# Patient Record
Sex: FEMALE | Race: WHITE | NOT HISPANIC OR LATINO | Employment: PART TIME | URBAN - NONMETROPOLITAN AREA
[De-identification: names, ages, dates, MRNs, and addresses within clinical notes are randomized per-mention and may not be internally consistent; named-entity substitution may affect disease eponyms.]

---

## 2017-05-01 ENCOUNTER — OFFICE VISIT (OUTPATIENT)
Dept: CARDIOLOGY | Facility: CLINIC | Age: 37
End: 2017-05-01

## 2017-05-01 VITALS
HEART RATE: 80 BPM | HEIGHT: 64 IN | BODY MASS INDEX: 36.37 KG/M2 | DIASTOLIC BLOOD PRESSURE: 78 MMHG | WEIGHT: 213 LBS | SYSTOLIC BLOOD PRESSURE: 114 MMHG

## 2017-05-01 DIAGNOSIS — R07.89 OTHER CHEST PAIN: ICD-10-CM

## 2017-05-01 DIAGNOSIS — E88.81 METABOLIC SYNDROME: ICD-10-CM

## 2017-05-01 DIAGNOSIS — I10 ESSENTIAL HYPERTENSION: Primary | ICD-10-CM

## 2017-05-01 DIAGNOSIS — R06.02 SHORTNESS OF BREATH: ICD-10-CM

## 2017-05-01 DIAGNOSIS — I27.20 PULMONARY HTN (HCC): ICD-10-CM

## 2017-05-01 DIAGNOSIS — I25.6 SILENT MYOCARDIAL ISCHEMIA: ICD-10-CM

## 2017-05-01 DIAGNOSIS — R00.0 TACHYCARDIA: ICD-10-CM

## 2017-05-01 PROCEDURE — 99214 OFFICE O/P EST MOD 30 MIN: CPT | Performed by: NURSE PRACTITIONER

## 2017-05-01 RX ORDER — TIZANIDINE 4 MG/1
4 TABLET ORAL NIGHTLY PRN
COMMUNITY
End: 2018-03-05

## 2017-05-01 RX ORDER — ERGOCALCIFEROL 1.25 MG/1
50000 CAPSULE ORAL
COMMUNITY
End: 2022-02-03

## 2017-05-01 RX ORDER — HYDROCODONE BITARTRATE AND ACETAMINOPHEN 5; 325 MG/1; MG/1
1 TABLET ORAL EVERY 8 HOURS PRN
COMMUNITY
End: 2017-12-12 | Stop reason: DRUGHIGH

## 2017-05-01 RX ORDER — SUMATRIPTAN 100 MG/1
100 TABLET, FILM COATED ORAL ONCE AS NEEDED
COMMUNITY
End: 2017-09-13 | Stop reason: DRUGHIGH

## 2017-05-01 RX ORDER — FUROSEMIDE 40 MG/1
40 TABLET ORAL DAILY
COMMUNITY
End: 2019-02-26 | Stop reason: SDUPTHER

## 2017-05-18 ENCOUNTER — OUTSIDE FACILITY SERVICE (OUTPATIENT)
Dept: CARDIOLOGY | Facility: CLINIC | Age: 37
End: 2017-05-18

## 2017-05-18 ENCOUNTER — HOSPITAL ENCOUNTER (OUTPATIENT)
Dept: CARDIOLOGY | Facility: HOSPITAL | Age: 37
Discharge: HOME | End: 2017-05-18

## 2017-05-18 DIAGNOSIS — E88.81 METABOLIC SYNDROME: ICD-10-CM

## 2017-05-18 DIAGNOSIS — I27.20 PULMONARY HTN (HCC): ICD-10-CM

## 2017-05-18 DIAGNOSIS — R06.02 SHORTNESS OF BREATH: ICD-10-CM

## 2017-05-18 DIAGNOSIS — R07.89 OTHER CHEST PAIN: ICD-10-CM

## 2017-05-18 DIAGNOSIS — I25.6 SILENT MYOCARDIAL ISCHEMIA: ICD-10-CM

## 2017-05-18 LAB
MAXIMAL PREDICTED HEART RATE: 182 BPM
STRESS TARGET HR: 155 BPM

## 2017-05-18 PROCEDURE — 93306 TTE W/DOPPLER COMPLETE: CPT

## 2017-05-18 PROCEDURE — A9500 TC99M SESTAMIBI: HCPCS | Performed by: INTERNAL MEDICINE

## 2017-05-18 PROCEDURE — 93306 TTE W/DOPPLER COMPLETE: CPT | Performed by: INTERNAL MEDICINE

## 2017-05-18 PROCEDURE — 0 TECHNETIUM SESTAMIBI: Performed by: INTERNAL MEDICINE

## 2017-05-18 PROCEDURE — 78452 HT MUSCLE IMAGE SPECT MULT: CPT

## 2017-05-18 PROCEDURE — 93017 CV STRESS TEST TRACING ONLY: CPT

## 2017-05-18 PROCEDURE — 93018 CV STRESS TEST I&R ONLY: CPT | Performed by: INTERNAL MEDICINE

## 2017-05-18 PROCEDURE — 78452 HT MUSCLE IMAGE SPECT MULT: CPT | Performed by: INTERNAL MEDICINE

## 2017-05-18 RX ADMIN — Medication 1 DOSE: at 08:45

## 2017-05-22 ENCOUNTER — TELEPHONE (OUTPATIENT)
Dept: CARDIOLOGY | Facility: CLINIC | Age: 37
End: 2017-05-22

## 2017-05-22 RX ORDER — CARVEDILOL 12.5 MG/1
12.5 TABLET ORAL 2 TIMES DAILY
Qty: 60 TABLET | Refills: 7 | Status: SHIPPED | OUTPATIENT
Start: 2017-05-22 | End: 2017-10-31

## 2017-05-23 ENCOUNTER — OUTSIDE FACILITY SERVICE (OUTPATIENT)
Dept: CARDIOLOGY | Facility: CLINIC | Age: 37
End: 2017-05-23

## 2017-05-23 PROCEDURE — 93227 XTRNL ECG REC<48 HR R&I: CPT | Performed by: INTERNAL MEDICINE

## 2017-09-13 ENCOUNTER — OFFICE VISIT (OUTPATIENT)
Dept: NEUROLOGY | Facility: CLINIC | Age: 37
End: 2017-09-13

## 2017-09-13 VITALS
WEIGHT: 221.2 LBS | BODY MASS INDEX: 37.76 KG/M2 | OXYGEN SATURATION: 98 % | HEART RATE: 93 BPM | HEIGHT: 64 IN | DIASTOLIC BLOOD PRESSURE: 80 MMHG | SYSTOLIC BLOOD PRESSURE: 124 MMHG

## 2017-09-13 DIAGNOSIS — G62.9 POLYNEUROPATHY: Primary | ICD-10-CM

## 2017-09-13 DIAGNOSIS — G56.02 CARPAL TUNNEL SYNDROME OF LEFT WRIST: ICD-10-CM

## 2017-09-13 DIAGNOSIS — G43.011 INTRACTABLE MIGRAINE WITHOUT AURA AND WITH STATUS MIGRAINOSUS: ICD-10-CM

## 2017-09-13 PROCEDURE — 99244 OFF/OP CNSLTJ NEW/EST MOD 40: CPT | Performed by: PSYCHIATRY & NEUROLOGY

## 2017-09-13 RX ORDER — TOPIRAMATE 50 MG/1
50 TABLET, FILM COATED ORAL 2 TIMES DAILY
Qty: 60 TABLET | Refills: 3 | Status: SHIPPED | OUTPATIENT
Start: 2017-09-13 | End: 2018-02-19 | Stop reason: SDUPTHER

## 2017-09-13 RX ORDER — AMITRIPTYLINE HYDROCHLORIDE 25 MG/1
25 TABLET, FILM COATED ORAL SEE ADMIN INSTRUCTIONS
Qty: 30 TABLET | Refills: 3 | Status: SHIPPED | OUTPATIENT
Start: 2017-09-13 | End: 2018-01-18 | Stop reason: SDUPTHER

## 2017-09-13 RX ORDER — SUMATRIPTAN 50 MG/1
50 TABLET, FILM COATED ORAL ONCE AS NEEDED
COMMUNITY
Start: 2017-09-11

## 2017-09-13 NOTE — PROGRESS NOTES
"[facility] NEUROLOGY [place] CONSULTATION   History of Present Illness     Date: 9/13/2017    Patient Identification  Renate Mitchell is a 37 y.o. female.    Patient information was obtained from patient.  History/Exam limitations: none.    CONSULTATION requested by: Alessandra BENSON    Chief Complaint   Establish Care and Peripheral Neuropathy (Pt c/o neuropathy, mainly on Left side. Sx started in 2008 following chemo and radiation treatments. Sx are worse, triggered with sitting, standing or long walks. Pt has had 2 nerve conduction tests, been treated with Lyrica and Topamax )      History of Present Illness   Patient is a delightful 37-year-old lady with history of diabetes and lymphoma.  She reports burning pain that started around Feb 2008 after treatment for her Lymphoma using \"R CHOP\" regimen. The burning pain is present in her UE and LE, starting from her digits and radiating up to her forearms and her knees respectively. She further reports numbness of her hands, especially on the left.   The pain was relieved by Topramax, but not anymore.  And she also reported that she is taking Topamax 100 mg 3 times a day she had difficulty with word finding .  Now she has reduced to 100 mg once a day .  She is currently seeking some kind of medication that can help relieve her symptoms.     PMH:   Past Medical History:   Diagnosis Date   • Chest biopsy    • Depression    • Diabetes mellitus    • H/O exploratory laparotomy    • H/O tubal ligation    • Heart BX , benign    • Heart disease    • History of bone marrow biopsy    • Migraine    • Non Hodgkin's lymphoma     very close to heart, in remission, last radiation 2/08, last chemo 12/07   • Pericardial effusion     s/p natty window- removed 398 cc        Past Surgical History:   Past Surgical History:   Procedure Laterality Date   • CARDIAC BIOPSY     • CARDIOVASCULAR STRESS TEST  01/11/2008    CESAR Cardiolyte- (Dr. Jay). EF 45% at rest, 53% with stress   • " CARDIOVASCULAR STRESS TEST  02/10/2009    3 min, 20 sec. 95% THR. bp- 180/98, EF 49%. Negative   • CARDIOVASCULAR STRESS TEST  2015    R. Stress- 4 min, 103% THR. 106/68, negative by EKG criteria   • CATH LAB PROCEDURE  2009    normal but small coronaries, EF 45-50%   • CATH LAB PROCEDURE  2015    essentially normal coronaries. EF 50%   •  SECTION      x3   • ECHO - CONVERTED  02/10/2009    EF 50-55%   • ECHO - CONVERTED  2015    EF 40-45%, RVSP 43 mmHg mild MR   • OTHER SURGICAL HISTORY  2015    CT abd/pelvis and chest- essentially unremarkable   • OTHER SURGICAL HISTORY  2015    HIDA scan- WNL   • PERICARDIAL WINDOW         Family Hisotry:   Family History   Problem Relation Age of Onset   • Heart disease Mother    • Hypertension Mother    • Heart failure Mother    • Hypertension Father    • Hyperlipidemia Father    • Heart disease Child    • Breast cancer Maternal Aunt    • Diabetes Maternal Aunt    • Colon cancer Maternal Uncle    • Leukemia Paternal Uncle    • Hodgkin's lymphoma Paternal Uncle    • Lung cancer Paternal Grandfather    • Leukemia Maternal Aunt        Social History:   Social History     Social History   • Marital status: Single     Spouse name: N/A   • Number of children: N/A   • Years of education: N/A     Occupational History   • Not on file.     Social History Main Topics   • Smoking status: Never Smoker   • Smokeless tobacco: Never Used   • Alcohol use No   • Drug use: Not on file   • Sexual activity: Not on file     Other Topics Concern   • Not on file     Social History Narrative       Medications:   Current Outpatient Prescriptions   Medication Sig Dispense Refill   • carvedilol (COREG) 12.5 MG tablet Take 1 tablet by mouth 2 (Two) Times a Day. 60 tablet 7   • clonazePAM (KLONOPIN) 0.5 MG tablet Take 0.5 mg by mouth 2 (Two) Times a Day As Needed for seizures.     • FLUoxetine (PROzac) 20 MG capsule Take 20 mg by mouth Daily.     • furosemide (LASIX) 40 MG  tablet Take 40 mg by mouth Daily.     • HYDROcodone-acetaminophen (NORCO) 5-325 MG per tablet Take 1 tablet by mouth Every 8 (Eight) Hours As Needed.     • levothyroxine (SYNTHROID, LEVOTHROID) 25 MCG tablet Take 25 mcg by mouth Daily.     • metFORMIN (GLUCOPHAGE) 500 MG tablet Take 500 mg by mouth 2 (Two) Times a Day With Meals.     • omeprazole (PriLOSEC) 40 MG capsule Take 40 mg by mouth Daily.     • pregabalin (LYRICA) 150 MG capsule Take 150 mg by mouth 2 (Two) Times a Day.     • SUMAtriptan (IMITREX) 50 MG tablet Take as directed     • tiZANidine (ZANAFLEX) 4 MG tablet Take 4 mg by mouth At Night As Needed for Muscle Spasms.     • topiramate (TOPAMAX) 50 MG tablet Take 1 tablet by mouth 2 (Two) Times a Day. 60 tablet 3   • vitamin D (ERGOCALCIFEROL) 81319 UNITS capsule capsule Take 50,000 Units by mouth 1 (One) Time Per Week.     • amitriptyline (ELAVIL) 25 MG tablet Take 1 tablet by mouth See Admin Instructions. One pill at supper time 30 tablet 3     No current facility-administered medications for this visit.        Allergy:   Allergies   Allergen Reactions   • Cymbalta [Duloxetine Hcl]    • Neurontin [Gabapentin]    • Tylox [Oxycodone-Acetaminophen]        Review of Systems:  Review of Systems   Constitutional: Negative for chills and fever.   HENT: Negative for congestion, ear pain, hearing loss, rhinorrhea and sore throat.    Eyes: Negative for pain, discharge and redness.   Respiratory: Negative for cough, shortness of breath, wheezing and stridor.    Cardiovascular: Negative for chest pain, palpitations and leg swelling.   Gastrointestinal: Negative for abdominal pain, constipation, nausea and vomiting.   Endocrine: Negative for cold intolerance, heat intolerance and polyphagia.   Genitourinary: Negative for dysuria, flank pain, frequency and urgency.   Musculoskeletal: Negative for joint swelling, myalgias, neck pain and neck stiffness.   Skin: Negative for pallor, rash and wound.  "  Allergic/Immunologic: Negative for environmental allergies.   Neurological: Positive for weakness, numbness and headaches. Negative for dizziness, tremors, seizures, syncope, facial asymmetry, speech difficulty and light-headedness.   Hematological: Negative for adenopathy.   Psychiatric/Behavioral: Positive for sleep disturbance. Negative for confusion and hallucinations. The patient is not nervous/anxious.        Physical Exam     Vitals:    09/13/17 1603   BP: 124/80   Pulse: 93   SpO2: 98%   Weight: 221 lb 3.2 oz (100 kg)   Height: 64\" (162.6 cm)     GENERAL: Patient is pleasant, cooperative, appears to be stated age.  Body habitus is endomorphic.  SKIN AND EXTREMITIES:  No skin rashes or lesions are noted.  No cyanosis, clubbing or edema of the extremities.    HEAD:  Head is normocephalic and atraumatic.    NECK: Neck are non-tender without thyromegaly or adenopathy.  Carotic upstrokes are 1+/4.  No cranial or cervical bruits.  The neck is supple with a full range of motion.   ENT: palate elevate symmetrically, no evidence of high arch palate, tongue midline erythema in posterior pharynx, Mallampati Classification Class III   CARDIOVASCULAR:  Regular rate and rhythm with normal S1 and S2 without rub or gallop.  RESPIRATORY:  Clear to auscultation without wheezes or crackle   ABDOMEN:  Soft and non-tender, positive bowel sound without hepatosplenomegaly  BACK:  Back is straight without midline defect.    PSYCH:  Higher cortical function/mental status:  The patient is alert.  She is oriented x3 to time, place and person.  Recent and the remote memory appear normal.  The patient has a good fund of knowledge.  There is no visual or auditory hallucination or suicidal or homicidal ideation.  SPEECH:There is no gross evidence of aphasia, dysarthria or agnosia.      CRANIAL NERVES:  Pupils are 4mm, equal round reactive to light, reacting briskly to 2mm without afferent pupillary defect.  Visual fields are intact to " confrontation testing.  Fundoscopic examination reveals sharp disk margins with normal vasculature.  No papilledema, hemorrhages or exudates.  Extraocular movements are full and smooth with normal pursuits and saccades.  No nystagmus noted.  The face is symmetric. palate elevate symmetrically, Tongue midline, positive gag reflex. The remainder of the cranial nerves are intact and symmetrical.    MOTOR: Strength is 5/5 throughout with normal tone and bulk with the following exceptions, 4/5 intrinsic muscles of the hands and feet.  No involuntary movements noted.    Deep Tendon Reflexes: 0 throughout    SENSATION: Stocking glove sensory deficit    Coordination:  The patient normally performs finger-nose-finger, heel-to-knee-to-shin and rapid alternating movements in symmetrical fashion.    COORDINATION AND GAIT:  The patient walks with a narrow-based gait.  Patient is able to heel-toe and tandem walk forward and backwards without difficulty.  Romberg and monopedal  Romberg are negative.    MUSCULOSKELETAL: Range of motion normal, no clubbing, cyanosis, or edema.  No joint swelling.              Records Reviewed: I have personally reviewed her previous medical record.    Renate was seen today for establish care and peripheral neuropathy.    Diagnoses and all orders for this visit:    Polyneuropathy    Carpal tunnel syndrome of left wrist    Intractable migraine without aura and with status migrainosus    Other orders  -     topiramate (TOPAMAX) 50 MG tablet; Take 1 tablet by mouth 2 (Two) Times a Day.  -     amitriptyline (ELAVIL) 25 MG tablet; Take 1 tablet by mouth See Admin Instructions. One pill at supper time        Treatments:  1.  I have counseled patient extensively today that her diabetes predisposed patient to develop chemotherapy induced Neuropathy  2.  I have also explained to the patient that diabetes also predisposed her to develop carpal tunnel syndrome  3.  I have advised patient should avoid repetitive  motion and she needed to take a break every 15-20 minutes  4.  I was started patient on Elavil 25 mg 1 pill as suppertime since her pain is primarily burning in sensation which suggest thinly myelinated fiber which is C fibers are involved  5.  I've counseled patient extensively on peripheral neuropathy as follow  I have counseled patient extensively on peripheral neuropathy.  The prevalence of peripheral neuropathy in a general practice is 8 percent in persons 55 years and older.    Peripheral neuropathy can be caused by a variety of systemic diseases, toxic exposures, medications, infections, and hereditary disorder. The most common treatable causes are diabetes, hypothyroidism, and nutritional deficiencies.  When a patient presents with symptoms of distal numbness, tingling and pain, or weakness, the first step is to determine whether the symptoms are the result of peripheral neuropathy or of other part of  the CNS, such as nerve roots, or nerve plexus. CNS lesions may be associated with other features, such as speech difficulty, double vision, ataxia, cranial nerve involvement, or, in cases of myelopathy, impairment of bowel and bladder functions. Deep tendon reflexes are usually brisk, and muscle tone is spastic. Lesions of the peripheral nerve roots are typically asymmetric, follow a dermatomal pattern of sensory symptoms, and may have associated neck and low back pain. Lesions of the plexus are asymmetric with sensorimotor involvement of multiple nerves in one extremity. The presence of neuropathic symptoms, decreased ankle reflexes, and decreased distal sensations, regardless of distal muscle weakness and atrophy, makes the diagnosis of peripheral neuropathy likely.   The next step is to find the etiology and exclude potentially treatable causes, such as acquired toxic, nutritional, inflammatory, or immune-mediated demyelinating disorders. The neuropathies must be further characterized by onset and  chronicity of symptoms, the pattern and extent of involvement, and the type of nerve fibers involved (i.e., sensory, motor, or autonomic).   The evaluation of a patient with peripheral neuropathy starts with simple blood tests, including a complete blood count, comprehensive metabolic profile, and measurement of erythrocyte sedimentation rate and fasting blood glucose, vitamin B12, and thyroid-stimulating hormone levels. Electrodiagnostic studies are recommended if the diagnosis remains unclear after initial diagnostic testing and a careful history and physical examination.  Treatment of peripheral neuropathy has two goals:  first, is to eliminate the offending agents, such as toxins or medications; correcting a nutritional deficiency; or treating the underlying disease (e.g., corticosteroid therapy for immune-mediated neuropathy). These steps are important to halt the progression of neuropathy, and they may improve symptoms. Second, is to help patients control troublesome symptoms of peripheral neuropathy, such as severe numbness and pain, as well as to alleviate disability resulting from weakness. Several pharmacologic options exist to treat neuropathic pain, including gabapentin [Neurontin], topiramate [Topamax], carbamazepine [Tegretol], pregabalin [Lyrica]) and antidepressants (e.g., amitriptyline). Topical patches and sprays containing lidocaine (Lidoderm) or capsaicin (Zostrix) also may relieve pain in some patients.Other supportive measures, such as foot care, weight reduction, and shoe selection, may also be helpful.  6.  I've explained to the patient that her migraine headache prophylactic treatment with Topamax should be changed to 50 mg 1 pill twice a day since the half life of Topamax is 12 hours once a day dosing is not adequate.    This Document is signed by Yimi Iraheta MD, FAAN, FAASM September 13, 20176:38 PM

## 2017-10-31 ENCOUNTER — OFFICE VISIT (OUTPATIENT)
Dept: CARDIOLOGY | Facility: CLINIC | Age: 37
End: 2017-10-31

## 2017-10-31 VITALS
HEIGHT: 64 IN | WEIGHT: 217 LBS | HEART RATE: 96 BPM | DIASTOLIC BLOOD PRESSURE: 82 MMHG | SYSTOLIC BLOOD PRESSURE: 124 MMHG | BODY MASS INDEX: 37.05 KG/M2

## 2017-10-31 DIAGNOSIS — G47.8 AWAKENS FROM SLEEP AT NIGHT: ICD-10-CM

## 2017-10-31 DIAGNOSIS — R06.02 SHORTNESS OF BREATH: ICD-10-CM

## 2017-10-31 DIAGNOSIS — E88.81 METABOLIC SYNDROME: ICD-10-CM

## 2017-10-31 DIAGNOSIS — R00.0 TACHYCARDIA: ICD-10-CM

## 2017-10-31 DIAGNOSIS — I10 ESSENTIAL HYPERTENSION: Primary | ICD-10-CM

## 2017-10-31 DIAGNOSIS — I51.9 LV DYSFUNCTION: ICD-10-CM

## 2017-10-31 DIAGNOSIS — R53.83 OTHER FATIGUE: ICD-10-CM

## 2017-10-31 PROCEDURE — 99214 OFFICE O/P EST MOD 30 MIN: CPT | Performed by: NURSE PRACTITIONER

## 2017-10-31 RX ORDER — CARVEDILOL 12.5 MG/1
TABLET ORAL
Qty: 270 TABLET | Refills: 3 | Status: SHIPPED | OUTPATIENT
Start: 2017-10-31 | End: 2018-03-05

## 2017-10-31 NOTE — PATIENT INSTRUCTIONS
Calorie Counting for Weight Loss  Calories are energy you get from the things you eat and drink. Your body uses this energy to keep you going throughout the day. The number of calories you eat affects your weight. When you eat more calories than your body needs, your body stores the extra calories as fat. When you eat fewer calories than your body needs, your body burns fat to get the energy it needs.  Calorie counting means keeping track of how many calories you eat and drink each day. If you make sure to eat fewer calories than your body needs, you should lose weight. In order for calorie counting to work, you will need to eat the number of calories that are right for you in a day to lose a healthy amount of weight per week. A healthy amount of weight to lose per week is usually 1-2 lb (0.5-0.9 kg). A dietitian can determine how many calories you need in a day and give you suggestions on how to reach your calorie goal.   WHAT IS MY MY PLAN?  My goal is to have __________ calories per day.   If I have this many calories per day, I should lose around __________ pounds per week.  WHAT DO I NEED TO KNOW ABOUT CALORIE COUNTING?  In order to meet your daily calorie goal, you will need to:  · Find out how many calories are in each food you would like to eat. Try to do this before you eat.  · Decide how much of the food you can eat.  · Write down what you ate and how many calories it had. Doing this is called keeping a food log.  WHERE DO I FIND CALORIE INFORMATION?  The number of calories in a food can be found on a Nutrition Facts label. Note that all the information on a label is based on a specific serving of the food. If a food does not have a Nutrition Facts label, try to look up the calories online or ask your dietitian for help.  HOW DO I DECIDE HOW MUCH TO EAT?  To decide how much of the food you can eat, you will need to consider both the number of calories in one serving and the size of one serving. This  information can be found on the Nutrition Facts label. If a food does not have a Nutrition Facts label, look up the information online or ask your dietitian for help.  Remember that calories are listed per serving. If you choose to have more than one serving of a food, you will have to multiply the calories per serving by the amount of servings you plan to eat. For example, the label on a package of bread might say that a serving size is 1 slice and that there are 90 calories in a serving. If you eat 1 slice, you will have eaten 90 calories. If you eat 2 slices, you will have eaten 180 calories.  HOW DO I KEEP A FOOD LOG?  After each meal, record the following information in your food log:  · What you ate.  · How much of it you ate.  · How many calories it had.  · Then, add up your calories.  Keep your food log near you, such as in a small notebook in your pocket. Another option is to use a mobile pedro or website. Some programs will calculate calories for you and show you how many calories you have left each time you add an item to the log.  WHAT ARE SOME CALORIE COUNTING TIPS?  · Use your calories on foods and drinks that will fill you up and not leave you hungry. Some examples of this include foods like nuts and nut butters, vegetables, lean proteins, and high-fiber foods (more than 5 g fiber per serving).  · Eat nutritious foods and avoid empty calories. Empty calories are calories you get from foods or beverages that do not have many nutrients, such as candy and soda. It is better to have a nutritious high-calorie food (such as an avocado) than a food with few nutrients (such as a bag of chips).  · Know how many calories are in the foods you eat most often. This way, you do not have to look up how many calories they have each time you eat them.  · Look out for foods that may seem like low-calorie foods but are really high-calorie foods, such as baked goods, soda, and fat-free candy.  · Pay attention to calories  in drinks. Drinks such as sodas, specialty coffee drinks, alcohol, and juices have a lot of calories yet do not fill you up. Choose low-calorie drinks like water and diet drinks.  · Focus your calorie counting efforts on higher calorie items. Logging the calories in a garden salad that contains only vegetables is less important than calculating the calories in a milk shake.  · Find a way of tracking calories that works for you. Get creative. Most people who are successful find ways to keep track of how much they eat in a day, even if they do not count every calorie.  WHAT ARE SOME PORTION CONTROL TIPS?  · Know how many calories are in a serving. This will help you know how many servings of a certain food you can have.  · Use a measuring cup to measure serving sizes. This is helpful when you start out. With time, you will be able to estimate serving sizes for some foods.  · Take some time to put servings of different foods on your favorite plates, bowls, and cups so you know what a serving looks like.  · Try not to eat straight from a bag or box. Doing this can lead to overeating. Put the amount you would like to eat in a cup or on a plate to make sure you are eating the right portion.  · Use smaller plates, glasses, and bowls to prevent overeating. This is a quick and easy way to practice portion control. If your plate is smaller, less food can fit on it.  · Try not to multitask while eating, such as watching TV or using your computer. If it is time to eat, sit down at a table and enjoy your food. Doing this will help you to start recognizing when you are full. It will also make you more aware of what and how much you are eating.  HOW CAN I CALORIE COUNT WHEN EATING OUT?  · Ask for smaller portion sizes or child-sized portions.  · Consider sharing an entree and sides instead of getting your own entree.  · If you get your own entree, eat only half. Ask for a box at the beginning of your meal and put the rest of your  "entree in it so you are not tempted to eat it.  · Look for the calories on the menu. If calories are listed, choose the lower calorie options.  · Choose dishes that include vegetables, fruits, whole grains, low-fat dairy products, and lean protein. Focusing on smart food choices from each of the 5 food groups can help you stay on track at restaurants.  · Choose items that are boiled, broiled, grilled, or steamed.  · Choose water, milk, unsweetened iced tea, or other drinks without added sugars. If you want an alcoholic beverage, choose a lower calorie option. For example, a regular tapan can have up to 700 calories and a glass of wine has around 150.  · Stay away from items that are buttered, battered, fried, or served with cream sauce. Items labeled \"crispy\" are usually fried, unless stated otherwise.  · Ask for dressings, sauces, and syrups on the side. These are usually very high in calories, so do not eat much of them.  · Watch out for salads. Many people think salads are a healthy option, but this is often not the case. Many salads come with treviño, fried chicken, lots of cheese, fried chips, and dressing. All of these items have a lot of calories. If you want a salad, choose a garden salad and ask for grilled meats or steak. Ask for the dressing on the side, or ask for olive oil and vinegar or lemon to use as dressing.  · Estimate how many servings of a food you are given. For example, a serving of cooked rice is ½ cup or about the size of half a tennis ball or one cupcake wrapper. Knowing serving sizes will help you be aware of how much food you are eating at restaurants. The list below tells you how big or small some common portion sizes are based on everyday objects.    1 oz--4 stacked dice.    3 oz--1 deck of cards.    1 tsp--1 dice.    1 Tbsp--½ a Ping-Pong ball.    2 Tbsp--1 Ping-Pong ball.    ½ cup--1 tennis ball or 1 cupcake wrapper.    1 cup--1 baseball.     This information is not intended to " replace advice given to you by your health care provider. Make sure you discuss any questions you have with your health care provider.     Document Released: 12/18/2006 Document Revised: 01/08/2016 Document Reviewed: 10/23/2014  [employer] Patient Education ©2017 [employer]

## 2017-10-31 NOTE — PROGRESS NOTES
"Chief Complaint   Patient presents with   • Follow-up     for cardiac management   • Shortness of Breath     contributes to her weight   • Chest Pain     comes and goes, contributes to scar tissue    • Peripheral Neuropathy     symptoms have increased, Dr Iraheta started Elavil.    • Labs     PCP monitors every 3 months, last checked a couple months ago.       Qian Mitchell is a 37 y.o. female with a history of non-Hodgkins lymphoma for which she has undergone chemo and radiation in the past with the last in 2008. She was referred to [facility] for worsening shortness of breath. In May of 2015 she was seen for pericarditis vs worsening lymphoma. Workup showed essentially normal coronaries and mild LV dysfunction.   At her last office visit she hd more edema and reoccurrence of chest pain. Repeat cardiac workup recommended. Stress test was negative for ischemia but showed increased heart rate and blood pressure for which the dose of Coreg was increased Echocardiogram showed some improvement of LV ejection fraction. A Holter monitor was also ordered. Baseline rhythm was sinus with average heat rate 96 bpm. No significant arrhythmia noted. She continues to be managed medically.   Today she comes to the office and her primary complaints are related to numbness and burning pain of hands and feet. She has seen neurologist and started on Elavil. From a cardiac standpoint she reports symptoms \"about the same\".     HPI         Cardiac History:    Past Surgical History:   Procedure Laterality Date   • CARDIAC BIOPSY     • CARDIOVASCULAR STRESS TEST  01/11/2008    CESAR Cardiolyte- (Dr. Jay). EF 45% at rest, 53% with stress   • CARDIOVASCULAR STRESS TEST  02/10/2009    3 min, 20 sec. 95% THR. bp- 180/98, EF 49%. Negative   • CARDIOVASCULAR STRESS TEST  07/2015    R. Stress- 4 min, 103% THR. 106/68, negative by EKG criteria   • CARDIOVASCULAR STRESS TEST  05/18/2017    4 min 39 sec, 97% THR, 157/76, no obvious " ischemia, increase Coreg   • CATH LAB PROCEDURE  2009    normal but small coronaries, EF 45-50%   • CATH LAB PROCEDURE  2015    essentially normal coronaries. EF 50%   •  SECTION      x3   • CONVERTED (HISTORICAL) HOLTER  2017    sinus ave 96 bpm, 1 PVC, 4 PACs   • ECHO - CONVERTED  02/10/2009    EF 50-55%   • ECHO - CONVERTED  2015    EF 40-45%, RVSP 43 mmHg mild MR   • ECHO - CONVERTED  2017    EF 50-55%, RVSP 37 mmHg, mild MR   • OTHER SURGICAL HISTORY  2015    CT abd/pelvis and chest- essentially unremarkable   • OTHER SURGICAL HISTORY  2015    HIDA scan- WNL   • PERICARDIAL WINDOW         Current Outpatient Prescriptions   Medication Sig Dispense Refill   • amitriptyline (ELAVIL) 25 MG tablet Take 1 tablet by mouth See Admin Instructions. One pill at supper time 30 tablet 3   • clonazePAM (KLONOPIN) 0.5 MG tablet Take 0.5 mg by mouth 2 (Two) Times a Day As Needed for seizures.     • FLUoxetine (PROzac) 20 MG capsule Take 20 mg by mouth Daily.     • furosemide (LASIX) 40 MG tablet Take 40 mg by mouth Daily.     • HYDROcodone-acetaminophen (NORCO) 5-325 MG per tablet Take 1 tablet by mouth Every 8 (Eight) Hours As Needed.     • levothyroxine (SYNTHROID, LEVOTHROID) 25 MCG tablet Take 25 mcg by mouth Daily.     • metFORMIN (GLUCOPHAGE) 500 MG tablet Take 500 mg by mouth 2 (Two) Times a Day With Meals.     • omeprazole (PriLOSEC) 40 MG capsule Take 40 mg by mouth Daily.     • pregabalin (LYRICA) 150 MG capsule Take 150 mg by mouth 2 (Two) Times a Day.     • SUMAtriptan (IMITREX) 50 MG tablet Take as directed     • tiZANidine (ZANAFLEX) 4 MG tablet Take 4 mg by mouth At Night As Needed for Muscle Spasms.     • topiramate (TOPAMAX) 50 MG tablet Take 1 tablet by mouth 2 (Two) Times a Day. 60 tablet 3   • vitamin D (ERGOCALCIFEROL) 45441 UNITS capsule capsule Take 50,000 Units by mouth 1 (One) Time Per Week.     • carvedilol (COREG) 12.5 MG tablet Take 2 tablets in the  morning and 1 tablet at night 270 tablet 3     No current facility-administered medications for this visit.        Cymbalta [duloxetine hcl]; Neurontin [gabapentin]; and Tylox [oxycodone-acetaminophen]    Past Medical History:   Diagnosis Date   • Chest biopsy    • Depression    • Diabetes mellitus    • H/O exploratory laparotomy    • H/O tubal ligation    • Heart BX , benign    • Heart disease    • History of bone marrow biopsy    • Migraine    • Non Hodgkin's lymphoma     very close to heart, in remission, last radiation 2/08, last chemo 12/07   • Pericardial effusion     s/p natty window- removed 398 cc        Social History     Social History   • Marital status: Single     Spouse name: N/A   • Number of children: N/A   • Years of education: N/A     Occupational History   • Not on file.     Social History Main Topics   • Smoking status: Never Smoker   • Smokeless tobacco: Never Used   • Alcohol use No   • Drug use: Not on file   • Sexual activity: Not on file     Other Topics Concern   • Not on file     Social History Narrative       Family History   Problem Relation Age of Onset   • Heart disease Mother    • Hypertension Mother    • Heart failure Mother    • Hypertension Father    • Hyperlipidemia Father    • Heart disease Child    • Breast cancer Maternal Aunt    • Diabetes Maternal Aunt    • Colon cancer Maternal Uncle    • Leukemia Paternal Uncle    • Hodgkin's lymphoma Paternal Uncle    • Lung cancer Paternal Grandfather    • Leukemia Maternal Aunt        Review of Systems   Constitution: Positive for malaise/fatigue (continues to have morning fatigue). Negative for decreased appetite.   HENT: Negative for congestion and sore throat.    Eyes: Negative for blurred vision.   Cardiovascular: Positive for palpitations. Negative for chest pain, near-syncope and syncope.   Respiratory: Positive for shortness of breath and sleep disturbances due to breathing (sometime wake up with symptoms of panic attack). Negative  "for snoring.    Endocrine: Negative for cold intolerance and heat intolerance.   Hematologic/Lymphatic: Negative for adenopathy. Does not bruise/bleed easily.   Skin: Negative for itching and nail changes.   Musculoskeletal: Positive for myalgias (hands and feet). Negative for arthritis and falls.   Gastrointestinal: Negative for abdominal pain, dysphagia, heartburn and melena.   Genitourinary: Negative for dysuria, frequency and hematuria.   Neurological: Positive for light-headedness (when raise up after benging over), loss of balance and numbness. Negative for dizziness, seizures and vertigo.   Psychiatric/Behavioral: Negative for altered mental status.   Allergic/Immunologic: Negative for hives.        Diabetes- Yes  Thyroid-abnormal    Objective     /82  Pulse 96  Ht 64\" (162.6 cm)  Wt 217 lb (98.4 kg)  BMI 37.25 kg/m2    Physical Exam   Constitutional: She is oriented to person, place, and time. She appears well-nourished.   HENT:   Head: Normocephalic.   Eyes: Pupils are equal, round, and reactive to light.   Neck: Normal range of motion. Neck supple. Carotid bruit is not present. No edema present.   Cardiovascular: Normal rate, regular rhythm, S1 normal and S2 normal.    No murmur heard.  Loud S2   Pulmonary/Chest: Breath sounds normal. She has no wheezes. She has no rales.   Abdominal: Soft. Bowel sounds are normal. She exhibits no distension. There is no tenderness.   Musculoskeletal: Normal range of motion.   Neurological: She is alert and oriented to person, place, and time.   Skin: Skin is warm.   Psychiatric: Her speech is normal and behavior is normal. Judgment and thought content normal. Her mood appears anxious.      Procedures          Assessment/Plan      Renate was seen today for follow-up, shortness of breath, chest pain, peripheral neuropathy and labs.    Diagnoses and all orders for this visit:    Essential hypertension  -     Overnight Sleep Oximetry Study; Future    Metabolic " syndrome    Tachycardia  -     Overnight Sleep Oximetry Study; Future    Other fatigue  -     Overnight Sleep Oximetry Study; Future    LV dysfunction    Shortness of breath  -     Overnight Sleep Oximetry Study; Future    Awakens from sleep at night  -     Overnight Sleep Oximetry Study; Future    Other orders  -     carvedilol (COREG) 12.5 MG tablet; Take 2 tablets in the morning and 1 tablet at night        We reviewed the reports of her most recent stress and echo. Her EF shows mild improvement 50-55% and no ischemia noted. Holter monitor report was also reviewed showing sinus with high normal average heart rate. She continues to have some issues with increased heart rate. We will try increasing the dose of Coreg to 25 in morning but continue 12.5 in evening at this time. She avoids caffeine and reports adequate water intake. She has symptoms suggestive of nocturnal hypoxia. I have requested an overnight oxygen monitor to evaluate. Weight loss encouraged and diet information given to her. She will follow with you and Dr. Cho for management of labs. Regarding neuropathy symptoms, she is following with neurologist.              Electronically signed by KELLEY Sanchez,  October 31, 2017 5:09 PM

## 2017-11-17 ENCOUNTER — HOSPITAL ENCOUNTER (OUTPATIENT)
Dept: ULTRASOUND IMAGING | Facility: HOSPITAL | Age: 37
Discharge: HOME | End: 2017-11-17

## 2017-11-17 ENCOUNTER — TRANSCRIBE ORDERS (OUTPATIENT)
Dept: ADMINISTRATIVE | Facility: HOSPITAL | Age: 37
End: 2017-11-17

## 2017-11-17 ENCOUNTER — HOSPITAL ENCOUNTER (OUTPATIENT)
Dept: ULTRASOUND IMAGING | Facility: HOSPITAL | Age: 37
Discharge: HOME | End: 2017-11-17
Admitting: NURSE PRACTITIONER

## 2017-11-17 DIAGNOSIS — R10.2 PELVIC PAIN: ICD-10-CM

## 2017-11-17 DIAGNOSIS — R10.9 ABDOMINAL PAIN, UNSPECIFIED ABDOMINAL LOCATION: ICD-10-CM

## 2017-11-17 DIAGNOSIS — R10.9 ABDOMINAL PAIN, UNSPECIFIED ABDOMINAL LOCATION: Primary | ICD-10-CM

## 2017-11-17 PROCEDURE — 76856 US EXAM PELVIC COMPLETE: CPT | Performed by: RADIOLOGY

## 2017-11-17 PROCEDURE — 76856 US EXAM PELVIC COMPLETE: CPT

## 2017-11-17 PROCEDURE — 76700 US EXAM ABDOM COMPLETE: CPT

## 2017-11-17 PROCEDURE — 76700 US EXAM ABDOM COMPLETE: CPT | Performed by: RADIOLOGY

## 2017-11-21 ENCOUNTER — LAB (OUTPATIENT)
Dept: LAB | Facility: HOSPITAL | Age: 37
End: 2017-11-21

## 2017-11-21 ENCOUNTER — OFFICE VISIT (OUTPATIENT)
Dept: SURGERY | Facility: CLINIC | Age: 37
End: 2017-11-21

## 2017-11-21 VITALS — WEIGHT: 217 LBS | HEIGHT: 64 IN | BODY MASS INDEX: 37.05 KG/M2

## 2017-11-21 DIAGNOSIS — R10.32 LLQ ABDOMINAL PAIN: ICD-10-CM

## 2017-11-21 DIAGNOSIS — K80.20 GALLSTONES: Primary | ICD-10-CM

## 2017-11-21 LAB
BASOPHILS # BLD AUTO: 0.03 10*3/MM3 (ref 0–0.3)
BASOPHILS NFR BLD AUTO: 0.4 % (ref 0–2)
DEPRECATED RDW RBC AUTO: 42.6 FL (ref 37–54)
EOSINOPHIL # BLD AUTO: 0.17 10*3/MM3 (ref 0–0.7)
EOSINOPHIL NFR BLD AUTO: 2.5 % (ref 0–5)
ERYTHROCYTE [DISTWIDTH] IN BLOOD BY AUTOMATED COUNT: 13.8 % (ref 11.5–14.5)
HCT VFR BLD AUTO: 35.8 % (ref 37–47)
HGB BLD-MCNC: 11.3 G/DL (ref 12–16)
IMM GRANULOCYTES # BLD: 0.01 10*3/MM3 (ref 0–0.03)
IMM GRANULOCYTES NFR BLD: 0.1 % (ref 0–0.5)
LYMPHOCYTES # BLD AUTO: 1.71 10*3/MM3 (ref 1–3)
LYMPHOCYTES NFR BLD AUTO: 25.1 % (ref 21–51)
MCH RBC QN AUTO: 27.2 PG (ref 27–33)
MCHC RBC AUTO-ENTMCNC: 31.6 G/DL (ref 33–37)
MCV RBC AUTO: 86.3 FL (ref 80–94)
MONOCYTES # BLD AUTO: 0.48 10*3/MM3 (ref 0.1–0.9)
MONOCYTES NFR BLD AUTO: 7.1 % (ref 0–10)
NEUTROPHILS # BLD AUTO: 4.4 10*3/MM3 (ref 1.4–6.5)
NEUTROPHILS NFR BLD AUTO: 64.8 % (ref 30–70)
PLATELET # BLD AUTO: 325 10*3/MM3 (ref 130–400)
PMV BLD AUTO: 8.5 FL (ref 6–10)
RBC # BLD AUTO: 4.15 10*6/MM3 (ref 4.2–5.4)
WBC NRBC COR # BLD: 6.8 10*3/MM3 (ref 4.5–12.5)

## 2017-11-21 PROCEDURE — 85025 COMPLETE CBC W/AUTO DIFF WBC: CPT

## 2017-11-21 PROCEDURE — 36415 COLL VENOUS BLD VENIPUNCTURE: CPT

## 2017-11-21 PROCEDURE — 99204 OFFICE O/P NEW MOD 45 MIN: CPT | Performed by: SURGERY

## 2017-11-21 NOTE — PROGRESS NOTES
Qian Mitchell is a 37 y.o. female.     History of Present Illness She has had LLQ abdominal pain and nausea over the last week or two. It hurts worse with moving but stays all the time. No prior GI issues. She has gallstones and has had prior laparoscopy and was told she had severe scarring in the pelvis. She also was treated for a UTI. Her bowels have been loose.     The following portions of the patient's history were reviewed and updated as appropriate: allergies, current medications, past family history, past medical history, past social history, past surgical history and problem list.    Review of Systems   Constitutional: Negative for activity change, appetite change, chills, fever and unexpected weight change.   HENT: Negative for congestion, facial swelling and sore throat.    Eyes: Negative for photophobia and visual disturbance.   Respiratory: Negative for chest tightness, shortness of breath and wheezing.    Cardiovascular: Negative for chest pain, palpitations and leg swelling.   Gastrointestinal: Positive for abdominal pain and nausea. Negative for abdominal distention, anal bleeding, blood in stool, constipation, diarrhea, rectal pain and vomiting.   Endocrine: Negative for cold intolerance, heat intolerance, polydipsia and polyuria.   Genitourinary: Negative for difficulty urinating, dysuria, flank pain and urgency.   Musculoskeletal: Negative for back pain and myalgias.   Skin: Negative for rash and wound.   Allergic/Immunologic: Negative for immunocompromised state.   Neurological: Negative for dizziness, seizures, syncope, light-headedness, numbness and headaches.   Hematological: Negative for adenopathy. Does not bruise/bleed easily.   Psychiatric/Behavioral: Negative for behavioral problems and confusion. The patient is not nervous/anxious.        Objective   Physical Exam   Constitutional: She is oriented to person, place, and time. She appears well-developed and well-nourished.  She does not appear ill. No distress.       HENT:   Head: Normocephalic. Head is without laceration. Hair is normal.   Right Ear: Hearing and ear canal normal.   Left Ear: Hearing and ear canal normal.   Nose: Nose normal. No sinus tenderness. No epistaxis. Right sinus exhibits no maxillary sinus tenderness and no frontal sinus tenderness. Left sinus exhibits no maxillary sinus tenderness and no frontal sinus tenderness.   Eyes: Conjunctivae and lids are normal. Pupils are equal, round, and reactive to light.   Neck: Normal range of motion. No JVD present. No tracheal tenderness present. No tracheal deviation present. No thyroid mass and no thyromegaly present.   Cardiovascular: Normal rate and regular rhythm.  Exam reveals no gallop.    No murmur heard.  Pulmonary/Chest: Effort normal and breath sounds normal. No stridor. She has no wheezes. She exhibits no tenderness.   Abdominal: Soft. Bowel sounds are normal. She exhibits no distension, no ascites and no mass. There is no tenderness. There is no rebound and no guarding. No hernia.   Musculoskeletal: She exhibits no edema or deformity.   Lymphadenopathy:     She has no cervical adenopathy.     She has no axillary adenopathy.        Right: No inguinal and no supraclavicular adenopathy present.        Left: No inguinal and no supraclavicular adenopathy present.   Neurological: She is alert and oriented to person, place, and time. She exhibits normal muscle tone.   Skin: Skin is warm, dry and intact. No rash noted. No erythema. No pallor.   Psychiatric: She has a normal mood and affect. Her behavior is normal. Thought content normal.   Vitals reviewed.      Assessment/Plan   Renate was seen today for abdominal pain, nausea, back pain and diarrhea.    Diagnoses and all orders for this visit:    Gallstones    LLQ abdominal pain    Check CT abdomen and CBC

## 2017-12-08 ENCOUNTER — HOSPITAL ENCOUNTER (OUTPATIENT)
Dept: CT IMAGING | Facility: HOSPITAL | Age: 37
Discharge: HOME | End: 2017-12-08
Attending: SURGERY | Admitting: SURGERY

## 2017-12-08 DIAGNOSIS — R10.32 LLQ ABDOMINAL PAIN: ICD-10-CM

## 2017-12-08 LAB — CREAT BLDA-MCNC: 0.8 MG/DL (ref 0.6–1.3)

## 2017-12-08 PROCEDURE — 82565 ASSAY OF CREATININE: CPT

## 2017-12-08 PROCEDURE — 74177 CT ABD & PELVIS W/CONTRAST: CPT | Performed by: RADIOLOGY

## 2017-12-08 PROCEDURE — 0 IOPAMIDOL 61 % SOLUTION: Performed by: SURGERY

## 2017-12-08 PROCEDURE — 74177 CT ABD & PELVIS W/CONTRAST: CPT

## 2017-12-08 RX ADMIN — IOPAMIDOL 100 ML: 612 INJECTION, SOLUTION INTRAVENOUS at 10:15

## 2017-12-12 ENCOUNTER — OFFICE VISIT (OUTPATIENT)
Dept: SURGERY | Facility: CLINIC | Age: 37
End: 2017-12-12

## 2017-12-12 ENCOUNTER — OFFICE VISIT (OUTPATIENT)
Dept: NEUROLOGY | Facility: CLINIC | Age: 37
End: 2017-12-12

## 2017-12-12 VITALS
OXYGEN SATURATION: 98 % | BODY MASS INDEX: 37.05 KG/M2 | DIASTOLIC BLOOD PRESSURE: 68 MMHG | HEIGHT: 64 IN | HEART RATE: 115 BPM | SYSTOLIC BLOOD PRESSURE: 122 MMHG | WEIGHT: 217 LBS

## 2017-12-12 VITALS — WEIGHT: 217 LBS | HEIGHT: 55 IN | BODY MASS INDEX: 50.22 KG/M2

## 2017-12-12 DIAGNOSIS — G62.9 POLYNEUROPATHY: Primary | ICD-10-CM

## 2017-12-12 DIAGNOSIS — R10.32 LLQ ABDOMINAL PAIN: Primary | ICD-10-CM

## 2017-12-12 DIAGNOSIS — D50.9 IRON DEFICIENCY ANEMIA, UNSPECIFIED IRON DEFICIENCY ANEMIA TYPE: ICD-10-CM

## 2017-12-12 DIAGNOSIS — K80.20 GALLSTONES: ICD-10-CM

## 2017-12-12 DIAGNOSIS — E11.40 DIABETIC NEUROPATHY, PAINFUL (HCC): ICD-10-CM

## 2017-12-12 PROCEDURE — 99213 OFFICE O/P EST LOW 20 MIN: CPT | Performed by: PSYCHIATRY & NEUROLOGY

## 2017-12-12 PROCEDURE — 99213 OFFICE O/P EST LOW 20 MIN: CPT | Performed by: SURGERY

## 2017-12-12 RX ORDER — HYDROCODONE BITARTRATE AND ACETAMINOPHEN 7.5; 325 MG/1; MG/1
1 TABLET ORAL EVERY 6 HOURS PRN
COMMUNITY
End: 2018-03-05

## 2017-12-12 RX ORDER — LANCETS 33 GAUGE
EACH MISCELLANEOUS
COMMUNITY
Start: 2017-11-06 | End: 2018-03-05

## 2017-12-12 RX ORDER — SODIUM, POTASSIUM,MAG SULFATES 17.5-3.13G
SOLUTION, RECONSTITUTED, ORAL ORAL
Qty: 2 BOTTLE | Refills: 0 | Status: ON HOLD | OUTPATIENT
Start: 2017-12-12 | End: 2018-03-08

## 2017-12-12 NOTE — PROGRESS NOTES
[facility] [place] PROGRESS NOTE  History of Present Illness     Date: 2017    Patient Identification  Renate Mitchell is a 37 y.o. female.    Patient information was obtained from patient.  History/Exam limitations: none.    Original consultation requested by: Anuel BENSON      Chief Complaint   Polyneuropathy (Pt in office today for 3 month follow up ) and Med Refill      History of Present Illness   Patient is a pleasant 37-year-old referred to [facility] neurology [place] for evaluation of polyneuropathy.  Patient had long-standing history of Type 2 diabetes.  Patient also have iron deficiency anemia which is under investigation by her family physician for possible GI loss.      She reported to have burning dysesthesia since 2008  PMH:   Past Medical History:   Diagnosis Date   • Chest biopsy    • Depression    • Diabetes mellitus    • H/O exploratory laparotomy    • H/O tubal ligation    • Heart BX , benign    • Heart disease    • History of bone marrow biopsy    • Migraine    • Non Hodgkin's lymphoma     very close to heart, in remission, last radiation , last chemo    • Pericardial effusion     s/p natty window- removed 398 cc        Past Surgical History:   Past Surgical History:   Procedure Laterality Date   • CARDIAC BIOPSY     • CARDIOVASCULAR STRESS TEST  2008    CESAR Cardiolyte- (Dr. Jay). EF 45% at rest, 53% with stress   • CARDIOVASCULAR STRESS TEST  02/10/2009    3 min, 20 sec. 95% THR. bp- 180/98, EF 49%. Negative   • CARDIOVASCULAR STRESS TEST  2015    R. Stress- 4 min, 103% THR. 106/68, negative by EKG criteria   • CARDIOVASCULAR STRESS TEST  2017    4 min 39 sec, 97% THR, 157/76, no obvious ischemia, increase Coreg   • CATH LAB PROCEDURE  2009    normal but small coronaries, EF 45-50%   • CATH LAB PROCEDURE  2015    essentially normal coronaries. EF 50%   •  SECTION      x3   • CONVERTED (HISTORICAL) HOLTER   05/23/2017    sinus ave 96 bpm, 1 PVC, 4 PACs   • ECHO - CONVERTED  02/10/2009    EF 50-55%   • ECHO - CONVERTED  07/20/2015    EF 40-45%, RVSP 43 mmHg mild MR   • ECHO - CONVERTED  05/18/2017    EF 50-55%, RVSP 37 mmHg, mild MR   • OTHER SURGICAL HISTORY  08/2015    CT abd/pelvis and chest- essentially unremarkable   • OTHER SURGICAL HISTORY  08/2015    HIDA scan- WNL   • PERICARDIAL WINDOW         Family Hisotry:   Family History   Problem Relation Age of Onset   • Heart disease Mother    • Hypertension Mother    • Heart failure Mother    • Hypertension Father    • Hyperlipidemia Father    • Heart disease Child    • Breast cancer Maternal Aunt    • Diabetes Maternal Aunt    • Colon cancer Maternal Uncle    • Leukemia Paternal Uncle    • Hodgkin's lymphoma Paternal Uncle    • Lung cancer Paternal Grandfather    • Leukemia Maternal Aunt        Social History:   Social History     Social History   • Marital status: Single     Spouse name: N/A   • Number of children: N/A   • Years of education: N/A     Occupational History   • Not on file.     Social History Main Topics   • Smoking status: Never Smoker   • Smokeless tobacco: Never Used   • Alcohol use No   • Drug use: No   • Sexual activity: Not on file     Other Topics Concern   • Not on file     Social History Narrative       Medications:   Current Outpatient Prescriptions   Medication Sig Dispense Refill   • amitriptyline (ELAVIL) 25 MG tablet Take 1 tablet by mouth See Admin Instructions. One pill at supper time 30 tablet 3   • carvedilol (COREG) 12.5 MG tablet Take 2 tablets in the morning and 1 tablet at night 270 tablet 3   • clonazePAM (KLONOPIN) 0.5 MG tablet Take 0.5 mg by mouth 2 (Two) Times a Day As Needed for seizures.     • FLUoxetine (PROzac) 20 MG capsule Take 20 mg by mouth Daily.     • furosemide (LASIX) 40 MG tablet Take 40 mg by mouth Daily.     • HYDROcodone-acetaminophen (NORCO) 7.5-325 MG per tablet Take 1 tablet by mouth Every 6 (Six) Hours As  Needed for Moderate Pain .     • levothyroxine (SYNTHROID, LEVOTHROID) 25 MCG tablet Take 25 mcg by mouth Daily.     • metFORMIN (GLUCOPHAGE) 500 MG tablet Take 500 mg by mouth 2 (Two) Times a Day With Meals.     • omeprazole (PriLOSEC) 40 MG capsule Take 40 mg by mouth Daily.     • ONE TOUCH ULTRA TEST test strip      • ONETOUCH DELICA LANCETS 33G misc      • pregabalin (LYRICA) 150 MG capsule Take 150 mg by mouth 2 (Two) Times a Day.     • SUMAtriptan (IMITREX) 50 MG tablet Take as directed     • tiZANidine (ZANAFLEX) 4 MG tablet Take 4 mg by mouth At Night As Needed for Muscle Spasms.     • topiramate (TOPAMAX) 50 MG tablet Take 1 tablet by mouth 2 (Two) Times a Day. 60 tablet 3   • vitamin D (ERGOCALCIFEROL) 33794 UNITS capsule capsule Take 50,000 Units by mouth 1 (One) Time Per Week.     • SUPREP BOWEL PREP KIT 17.5-3.13-1.6 GM/180ML solution oral solution Dispense one Kit        Sig: Used as Directed 2 bottle 0     No current facility-administered medications for this visit.        Allergy:   Allergies   Allergen Reactions   • Cymbalta [Duloxetine Hcl]    • Neurontin [Gabapentin]    • Tylox [Oxycodone-Acetaminophen]        Review of Systems:  Review of Systems   Constitutional: Negative for chills and fever.   HENT: Negative for congestion, ear pain, hearing loss, rhinorrhea and sore throat.    Eyes: Negative for pain, discharge and redness.   Respiratory: Negative for cough, shortness of breath, wheezing and stridor.    Cardiovascular: Negative for chest pain, palpitations and leg swelling.   Gastrointestinal: Negative for abdominal pain, constipation, nausea and vomiting.   Endocrine: Negative for cold intolerance, heat intolerance and polyphagia.   Genitourinary: Negative for dysuria, flank pain, frequency and urgency.   Musculoskeletal: Positive for gait problem. Negative for joint swelling, myalgias, neck pain and neck stiffness.   Skin: Negative for pallor, rash and wound.   Allergic/Immunologic:  "Negative for environmental allergies.   Neurological: Positive for weakness and numbness. Negative for dizziness, tremors, seizures, syncope, facial asymmetry, speech difficulty, light-headedness and headaches.   Hematological: Negative for adenopathy.   Psychiatric/Behavioral: Negative for confusion and hallucinations. The patient is not nervous/anxious.        Physical Exam     Vitals:    12/12/17 1604   BP: 122/68   Pulse: 115   SpO2: 98%   Weight: 98.4 kg (217 lb)   Height: 162.6 cm (64\")     GENERAL: Patient is pleasant, cooperative, appears to be stated age.  Body habitus is endomorphic.  SKIN AND EXTREMITIES:  No skin rashes or lesions are noted.  No cyanosis, clubbing or edema of the extremities.    HEAD:  Head is normocephalic and atraumatic.    NECK: Neck are non-tender without thyromegaly or adenopathy.  Carotic upstrokes are 1+/4.  No cranial or cervical bruits.  The neck is supple with a full range of motion.   ENT: palate elevate symmetrically, no evidence of high arch palate, tongue midline erythema in posterior pharynx, Mallampati Classification Class III   CARDIOVASCULAR:  Regular rate and rhythm with normal S1 and S2 without rub or gallop.  RESPIRATORY:  Clear to auscultation without wheezes or crackle   ABDOMEN:  Soft and non-tender, positive bowel sound without hepatosplenomegaly  BACK:  Back is straight without midline defect.    PSYCH:  Higher cortical function/mental status:  The patient is alert.  She is oriented x3 to time, place and person.  Recent and the remote memory appear normal.  The patient has a good fund of knowledge.  There is no visual or auditory hallucination or suicidal or homicidal ideation.  SPEECH:There is no gross evidence of aphasia, dysarthria or agnosia.      CRANIAL NERVES:  Pupils are 4mm, equal round reactive to light, reacting briskly to 2mm without afferent pupillary defect.  Visual fields are intact to confrontation testing.  Fundoscopic examination reveals sharp " disk margins with normal vasculature.  No papilledema, hemorrhages or exudates.  Extraocular movements are full and smooth with normal pursuits and saccades.  No nystagmus noted.  The face is symmetric. palate elevate symmetrically, Tongue midline, positive gag reflex. The remainder of the cranial nerves are intact and symmetrical.    MOTOR: Strength is 5/5 throughout with normal tone and bulk with the following exceptions, 4/5 intrinsic muscles of the hands and feet.  No involuntary movements noted.    Deep Tendon Reflexes: are 2/4 and symmetrical in the upper extremities, 2/4 and symmetrical at the knees and 1/4 and symmetrical at the Achilles tendon.  Plantar responses were down-going bilaterally.    SENSATION:  Intact to pinprick, light touch, vibration and proprioception.  Coordination:  The patient normally performs finger-nose-finger, heel-to-knee-to-shin and rapid alternating movements in symmetrical fashion.    COORDINATION AND GAIT:  The patient walks with a narrow-based gait.  Patient is able to heel-toe and tandem walk forward and backwards without difficulty.  Romberg and monopedal  Romberg are negative.    MUSCULOSKELETAL: Range of motion normal, no clubbing, cyanosis, or edema.  No joint swelling.              Records Reviewed: I have personally reviewed her previous medical record.    Renate was seen today for polyneuropathy and med refill.    Diagnoses and all orders for this visit:    Polyneuropathy    Iron deficiency anemia, unspecified iron deficiency anemia type    Diabetic neuropathy, painful        Discussion:  1. Counseled patient extensively on peripheral polyneuropathy  I have counseled patient extensively on peripheral neuropathy.  The prevalence of peripheral neuropathy in a general practice is 8 percent in persons 55 years and older.    Peripheral neuropathy can be caused by a variety of systemic diseases, toxic exposures, medications, infections, and hereditary disorder. The most common  treatable causes are diabetes, hypothyroidism, and nutritional deficiencies.  When a patient presents with symptoms of distal numbness, tingling and pain, or weakness, the first step is to determine whether the symptoms are the result of peripheral neuropathy or of other part of  the CNS, such as nerve roots, or nerve plexus. CNS lesions may be associated with other features, such as speech difficulty, double vision, ataxia, cranial nerve involvement, or, in cases of myelopathy, impairment of bowel and bladder functions. Deep tendon reflexes are usually brisk, and muscle tone is spastic. Lesions of the peripheral nerve roots are typically asymmetric, follow a dermatomal pattern of sensory symptoms, and may have associated neck and low back pain. Lesions of the plexus are asymmetric with sensorimotor involvement of multiple nerves in one extremity. The presence of neuropathic symptoms, decreased ankle reflexes, and decreased distal sensations, regardless of distal muscle weakness and atrophy, makes the diagnosis of peripheral neuropathy likely.   The next step is to find the etiology and exclude potentially treatable causes, such as acquired toxic, nutritional, inflammatory, or immune-mediated demyelinating disorders. The neuropathies must be further characterized by onset and chronicity of symptoms, the pattern and extent of involvement, and the type of nerve fibers involved (i.e., sensory, motor, or autonomic).   The evaluation of a patient with peripheral neuropathy starts with simple blood tests, including a complete blood count, comprehensive metabolic profile, and measurement of erythrocyte sedimentation rate and fasting blood glucose, vitamin B12, and thyroid-stimulating hormone levels. Electrodiagnostic studies are recommended if the diagnosis remains unclear after initial diagnostic testing and a careful history and physical examination.  Treatment of peripheral neuropathy has two goals:  first, is to  eliminate the offending agents, such as toxins or medications; correcting a nutritional deficiency; or treating the underlying disease (e.g., corticosteroid therapy for immune-mediated neuropathy). These steps are important to halt the progression of neuropathy, and they may improve symptoms. Second, is to help patients control troublesome symptoms of peripheral neuropathy, such as severe numbness and pain, as well as to alleviate disability resulting from weakness. Several pharmacologic options exist to treat neuropathic pain, including gabapentin , topiramate [Topamax], carbamazepine [Tegretol], pregabalin [Lyrica]) and antidepressants (e.g., amitriptyline). Topical patches and sprays containing lidocaine (Lidoderm) or capsaicin (Zostrix) also may relieve pain in some patients.Other supportive measures, such as foot care, weight reduction, and shoe selection, may also be helpful.  2.  Counseled patient the importance of tight blood sugar control  3.  Encourage patient should take iron supplement with vitamin C    This Document is signed by Yimi Iraheta MD, FAAN, FAASM   December 12, 20175:12 PM

## 2017-12-12 NOTE — PROGRESS NOTES
Qian Mitchell is a 37 y.o. female.     History of Present Illness She has had no change in her LLQ pain and her CT did not show anything new. She has gallstones but no sign of inflammation on the CT. She has been on chronic narcotics since her treatment for lymphoma in 2007. She had recently had an increase in her pain medicine and she was put on linzess. She has daily bowel movements.     The following portions of the patient's history were reviewed and updated as appropriate: allergies, current medications, past family history, past medical history, past social history, past surgical history and problem list.    Review of Systems   Constitutional: Negative for activity change, appetite change, chills, fever and unexpected weight change.   HENT: Negative for congestion, facial swelling and sore throat.    Eyes: Negative for photophobia and visual disturbance.   Respiratory: Negative for chest tightness, shortness of breath and wheezing.    Cardiovascular: Negative for chest pain, palpitations and leg swelling.   Gastrointestinal: Positive for abdominal pain. Negative for abdominal distention, anal bleeding, blood in stool, constipation, diarrhea, nausea, rectal pain and vomiting.   Endocrine: Negative for cold intolerance, heat intolerance, polydipsia and polyuria.   Genitourinary: Negative for difficulty urinating, dysuria, flank pain and urgency.   Musculoskeletal: Negative for back pain and myalgias.   Skin: Negative for rash and wound.   Allergic/Immunologic: Negative for immunocompromised state.   Neurological: Negative for dizziness, seizures, syncope, light-headedness, numbness and headaches.   Hematological: Negative for adenopathy. Does not bruise/bleed easily.   Psychiatric/Behavioral: Negative for behavioral problems and confusion. The patient is not nervous/anxious.        Objective   Physical Exam   Constitutional: She is oriented to person, place, and time. She appears well-developed  and well-nourished. She does not appear ill. No distress.       HENT:   Head: Normocephalic. Head is without laceration. Hair is normal.   Right Ear: Hearing and ear canal normal.   Left Ear: Hearing and ear canal normal.   Nose: Nose normal. No sinus tenderness. No epistaxis. Right sinus exhibits no maxillary sinus tenderness and no frontal sinus tenderness. Left sinus exhibits no maxillary sinus tenderness and no frontal sinus tenderness.   Eyes: Conjunctivae and lids are normal. Pupils are equal, round, and reactive to light.   Neck: Normal range of motion. No JVD present. No tracheal tenderness present. No tracheal deviation present. No thyroid mass and no thyromegaly present.   Cardiovascular: Normal rate and regular rhythm.  Exam reveals no gallop.    No murmur heard.  Pulmonary/Chest: Effort normal and breath sounds normal. No stridor. She has no wheezes. She exhibits no tenderness.   Abdominal: Soft. Bowel sounds are normal. She exhibits no distension, no ascites and no mass. There is tenderness. There is no rebound and no guarding. No hernia.   Musculoskeletal: She exhibits no edema or deformity.   Lymphadenopathy:     She has no cervical adenopathy.     She has no axillary adenopathy.        Right: No inguinal and no supraclavicular adenopathy present.        Left: No inguinal and no supraclavicular adenopathy present.   Neurological: She is alert and oriented to person, place, and time. She exhibits normal muscle tone.   Skin: Skin is warm, dry and intact. No rash noted. No erythema. No pallor.   Psychiatric: She has a normal mood and affect. Her behavior is normal. Thought content normal.   Vitals reviewed.      Assessment/Plan   Renate was seen today for follow-up.    Diagnoses and all orders for this visit:    LLQ abdominal pain    Gallstones    I do not think that the pain is from the gallstones and the only other thing to check is a colonoscopy.

## 2017-12-22 ENCOUNTER — ANESTHESIA (OUTPATIENT)
Dept: PERIOP | Facility: HOSPITAL | Age: 37
End: 2017-12-22

## 2017-12-22 ENCOUNTER — HOSPITAL ENCOUNTER (OUTPATIENT)
Facility: HOSPITAL | Age: 37
Setting detail: HOSPITAL OUTPATIENT SURGERY
Discharge: HOME | End: 2017-12-22
Attending: SURGERY | Admitting: SURGERY

## 2017-12-22 ENCOUNTER — ANESTHESIA EVENT (OUTPATIENT)
Dept: PERIOP | Facility: HOSPITAL | Age: 37
End: 2017-12-22

## 2017-12-22 VITALS
SYSTOLIC BLOOD PRESSURE: 101 MMHG | DIASTOLIC BLOOD PRESSURE: 74 MMHG | HEART RATE: 78 BPM | OXYGEN SATURATION: 100 % | RESPIRATION RATE: 20 BRPM | TEMPERATURE: 97 F

## 2017-12-22 DIAGNOSIS — R10.32 LLQ ABDOMINAL PAIN: ICD-10-CM

## 2017-12-22 LAB
B-HCG UR QL: NEGATIVE
GLUCOSE BLDC GLUCOMTR-MCNC: 86 MG/DL (ref 70–130)
INTERNAL NEGATIVE CONTROL: NEGATIVE
INTERNAL POSITIVE CONTROL: POSITIVE
Lab: NORMAL

## 2017-12-22 PROCEDURE — 25010000002 PROPOFOL 10 MG/ML EMULSION: Performed by: NURSE ANESTHETIST, CERTIFIED REGISTERED

## 2017-12-22 PROCEDURE — 82962 GLUCOSE BLOOD TEST: CPT

## 2017-12-22 PROCEDURE — 25010000002 FENTANYL CITRATE (PF) 100 MCG/2ML SOLUTION: Performed by: NURSE ANESTHETIST, CERTIFIED REGISTERED

## 2017-12-22 PROCEDURE — 45380 COLONOSCOPY AND BIOPSY: CPT | Performed by: SURGERY

## 2017-12-22 RX ORDER — SODIUM CHLORIDE 0.9 % (FLUSH) 0.9 %
1-10 SYRINGE (ML) INJECTION AS NEEDED
Status: DISCONTINUED | OUTPATIENT
Start: 2017-12-22 | End: 2017-12-22 | Stop reason: HOSPADM

## 2017-12-22 RX ORDER — PROPOFOL 10 MG/ML
VIAL (ML) INTRAVENOUS CONTINUOUS PRN
Status: DISCONTINUED | OUTPATIENT
Start: 2017-12-22 | End: 2017-12-22 | Stop reason: SURG

## 2017-12-22 RX ORDER — SODIUM CHLORIDE, SODIUM LACTATE, POTASSIUM CHLORIDE, CALCIUM CHLORIDE 600; 310; 30; 20 MG/100ML; MG/100ML; MG/100ML; MG/100ML
125 INJECTION, SOLUTION INTRAVENOUS CONTINUOUS
Status: DISCONTINUED | OUTPATIENT
Start: 2017-12-22 | End: 2017-12-22 | Stop reason: HOSPADM

## 2017-12-22 RX ORDER — FENTANYL CITRATE 50 UG/ML
INJECTION, SOLUTION INTRAMUSCULAR; INTRAVENOUS AS NEEDED
Status: DISCONTINUED | OUTPATIENT
Start: 2017-12-22 | End: 2017-12-22 | Stop reason: SURG

## 2017-12-22 RX ORDER — FENTANYL CITRATE 50 UG/ML
50 INJECTION, SOLUTION INTRAMUSCULAR; INTRAVENOUS
Status: DISCONTINUED | OUTPATIENT
Start: 2017-12-22 | End: 2017-12-22 | Stop reason: HOSPADM

## 2017-12-22 RX ORDER — IPRATROPIUM BROMIDE AND ALBUTEROL SULFATE 2.5; .5 MG/3ML; MG/3ML
3 SOLUTION RESPIRATORY (INHALATION) ONCE AS NEEDED
Status: DISCONTINUED | OUTPATIENT
Start: 2017-12-22 | End: 2017-12-22 | Stop reason: HOSPADM

## 2017-12-22 RX ORDER — LIDOCAINE HYDROCHLORIDE 10 MG/ML
INJECTION, SOLUTION INFILTRATION; PERINEURAL AS NEEDED
Status: DISCONTINUED | OUTPATIENT
Start: 2017-12-22 | End: 2017-12-22 | Stop reason: SURG

## 2017-12-22 RX ORDER — ONDANSETRON 2 MG/ML
4 INJECTION INTRAMUSCULAR; INTRAVENOUS ONCE AS NEEDED
Status: DISCONTINUED | OUTPATIENT
Start: 2017-12-22 | End: 2017-12-22 | Stop reason: HOSPADM

## 2017-12-22 RX ORDER — MEPERIDINE HYDROCHLORIDE 25 MG/ML
12.5 INJECTION INTRAMUSCULAR; INTRAVENOUS; SUBCUTANEOUS
Status: DISCONTINUED | OUTPATIENT
Start: 2017-12-22 | End: 2017-12-22 | Stop reason: HOSPADM

## 2017-12-22 RX ADMIN — LIDOCAINE HYDROCHLORIDE 60 MG: 10 INJECTION, SOLUTION INFILTRATION; PERINEURAL at 09:10

## 2017-12-22 RX ADMIN — PROPOFOL 250 MCG/KG/MIN: 10 INJECTION, EMULSION INTRAVENOUS at 09:14

## 2017-12-22 RX ADMIN — FENTANYL CITRATE 100 MCG: 50 INJECTION INTRAMUSCULAR; INTRAVENOUS at 09:10

## 2017-12-22 RX ADMIN — SODIUM CHLORIDE, POTASSIUM CHLORIDE, SODIUM LACTATE AND CALCIUM CHLORIDE: 600; 310; 30; 20 INJECTION, SOLUTION INTRAVENOUS at 09:10

## 2017-12-22 NOTE — ANESTHESIA POSTPROCEDURE EVALUATION
Patient: Renate Mitchell    Procedure Summary     Date Anesthesia Start Anesthesia Stop Room / Location    12/22/17 0911 0935  [facility] / [facility]       Procedure Diagnosis Surgeon Provider    COLONOSCOPY (N/A ) LLQ abdominal pain  (LLQ abdominal pain [R10.32]) MD Julian Mcdowell MD          Anesthesia Type: general  Last vitals  BP   129/66 (12/22/17 0937)   Temp   97 °F (36.1 °C) (12/22/17 0937)   Pulse   94 (12/22/17 0937)   Resp   18 (12/22/17 0937)     SpO2   99 % (12/22/17 0937)     Post Anesthesia Care and Evaluation    Patient location during evaluation: PHASE II  Patient participation: complete - patient participated  Level of consciousness: awake and alert  Pain score: 1  Pain management: adequate  Airway patency: patent  Anesthetic complications: No anesthetic complications  PONV Status: controlled  Cardiovascular status: acceptable  Respiratory status: acceptable  Hydration status: acceptable

## 2017-12-22 NOTE — OP NOTE
COLONOSCOPY  Procedure Note    Renate STONE Paula  12/22/2017    Pre-op Diagnosis:   LLQ abdominal pain [R10.32]    Post-op Diagnosis:  Polyp right colon       Procedure(s):  COLONOSCOPY    Surgeon(s):  Artie Oneill MD    Anesthesia: Choice    Staff:   Circulator: Laurel Rivers RN  Endo Technician: Carlin Cuevas    Estimated Blood Loss: none    Specimens:                  Order Name Source Comment Collection Info Order Time   TISSUE PATHOLOGY EXAM Large Intestine  Collected By: Artie Oneill MD 12/22/2017  9:30 AM         Drains:           Procedure: The scope passed slowly to the right colon. She had a very large long colon. The cecum was unremarkable and she had a small polyp in the ascending colon removed with a biopsy forceps. There was no inflammation or other lesions seen in the rest of the colon.     Findings: long large colon. Polyp in right colon    Complications: none       Artie Oneill MD     Date: 12/22/2017  Time: 9:37 AM

## 2017-12-22 NOTE — ANESTHESIA PREPROCEDURE EVALUATION
Anesthesia Evaluation     Patient summary reviewed and Nursing notes reviewed   no history of anesthetic complications:  NPO Solid Status: > 8 hours  NPO Liquid Status: > 8 hours     Airway   Mallampati: II  TM distance: >3 FB  Neck ROM: full  no difficulty expected  Dental - normal exam     Pulmonary - negative pulmonary ROS and normal exam   (-) shortness of breath, not a smoker  Cardiovascular - normal exam  Exercise tolerance: good (4-7 METS)    NYHA Classification: II    (+) hypertension, dysrhythmias,   (-) past MI, angina, CHF, hyperlipidemia      Neuro/Psych  (+) headaches, psychiatric history Depression,    (-) seizures, CVA  GI/Hepatic/Renal/Endo    (+)  GERD, diabetes mellitus, hypothyroidism,     Musculoskeletal     (-) back pain, neck pain  Abdominal  - normal exam    Bowel sounds: normal.   Substance History - negative use     OB/GYN negative ob/gyn ROS         Other   (+) arthritis   history of cancer remission    ROS/Med Hx Other: Non hodgkin's lymphoma                                  Anesthesia Plan    ASA 3     general     intravenous induction   Anesthetic plan and risks discussed with patient and spouse/significant other.  Use of blood products discussed with patient and spouse/significant other .

## 2017-12-26 LAB
LAB AP CASE REPORT: NORMAL
Lab: NORMAL
PATH REPORT.FINAL DX SPEC: NORMAL

## 2017-12-28 ENCOUNTER — OFFICE VISIT (OUTPATIENT)
Dept: SURGERY | Facility: CLINIC | Age: 37
End: 2017-12-28

## 2017-12-28 VITALS — HEIGHT: 55 IN | BODY MASS INDEX: 50.22 KG/M2 | WEIGHT: 217 LBS

## 2017-12-28 DIAGNOSIS — R10.32 LLQ ABDOMINAL PAIN: Primary | ICD-10-CM

## 2017-12-28 DIAGNOSIS — K80.20 GALLSTONES: ICD-10-CM

## 2017-12-28 PROCEDURE — 99212 OFFICE O/P EST SF 10 MIN: CPT | Performed by: SURGERY

## 2018-01-19 RX ORDER — AMITRIPTYLINE HYDROCHLORIDE 25 MG/1
TABLET, FILM COATED ORAL
Qty: 30 TABLET | Refills: 4 | Status: SHIPPED | OUTPATIENT
Start: 2018-01-19 | End: 2018-06-12 | Stop reason: SDUPTHER

## 2018-01-19 NOTE — TELEPHONE ENCOUNTER
Pt called requesting refill on Amitriptyline. Pt isn't supposed to RTC until 06/12/18.    30 day supply with 4 RF sent to [facility].

## 2018-02-19 ENCOUNTER — OFFICE VISIT (OUTPATIENT)
Dept: SURGERY | Facility: CLINIC | Age: 38
End: 2018-02-19

## 2018-02-19 VITALS — WEIGHT: 217 LBS | BODY MASS INDEX: 37.05 KG/M2 | HEIGHT: 64 IN

## 2018-02-19 DIAGNOSIS — R10.32 LLQ ABDOMINAL PAIN: Primary | ICD-10-CM

## 2018-02-19 DIAGNOSIS — K80.20 GALLSTONES: ICD-10-CM

## 2018-02-19 PROCEDURE — 99213 OFFICE O/P EST LOW 20 MIN: CPT | Performed by: SURGERY

## 2018-02-19 RX ORDER — TOPIRAMATE 50 MG/1
TABLET, FILM COATED ORAL
Qty: 60 TABLET | Refills: 2 | Status: SHIPPED | OUTPATIENT
Start: 2018-02-19 | End: 2018-05-25 | Stop reason: SDUPTHER

## 2018-02-19 NOTE — PROGRESS NOTES
Qian Mitchell is a 37 y.o. female.     History of Present Illness She has seen Dr Greenwood and she is getting a hysterectomy. She can get her gallbladder out the same time. She has an enlarged uterus. Her pain is still mostly in the LLQ.     The following portions of the patient's history were reviewed and updated as appropriate: current medications, past family history, past medical history, past social history, past surgical history and problem list.    Review of Systems   Constitutional: Negative for activity change, appetite change, chills, fever and unexpected weight change.   HENT: Negative for congestion, facial swelling and sore throat.    Eyes: Negative for photophobia and visual disturbance.   Respiratory: Negative for chest tightness, shortness of breath and wheezing.    Cardiovascular: Negative for chest pain, palpitations and leg swelling.   Gastrointestinal: Positive for abdominal pain and constipation. Negative for abdominal distention, anal bleeding, blood in stool, diarrhea, nausea, rectal pain and vomiting.   Endocrine: Negative for cold intolerance, heat intolerance, polydipsia and polyuria.   Genitourinary: Negative for difficulty urinating, dysuria, flank pain and urgency.   Musculoskeletal: Negative for back pain and myalgias.   Skin: Negative for rash and wound.   Allergic/Immunologic: Negative for immunocompromised state.   Neurological: Negative for dizziness, seizures, syncope, light-headedness, numbness and headaches.   Hematological: Negative for adenopathy. Does not bruise/bleed easily.   Psychiatric/Behavioral: Negative for behavioral problems and confusion. The patient is not nervous/anxious.        Objective   Physical Exam   Constitutional: She is oriented to person, place, and time. She appears well-developed and well-nourished. She does not appear ill. No distress.       HENT:   Head: Normocephalic. Head is without laceration. Hair is normal.   Right Ear: Hearing  and ear canal normal.   Left Ear: Hearing and ear canal normal.   Nose: Nose normal. No sinus tenderness. No epistaxis. Right sinus exhibits no maxillary sinus tenderness and no frontal sinus tenderness. Left sinus exhibits no maxillary sinus tenderness and no frontal sinus tenderness.   Eyes: Conjunctivae and lids are normal. Pupils are equal, round, and reactive to light.   Neck: Normal range of motion. No JVD present. No tracheal tenderness present. No tracheal deviation present. No thyroid mass and no thyromegaly present.   Cardiovascular: Normal rate and regular rhythm.  Exam reveals no gallop.    No murmur heard.  Pulmonary/Chest: Effort normal and breath sounds normal. No stridor. She has no wheezes. She exhibits no tenderness.   Abdominal: Soft. Bowel sounds are normal. She exhibits no distension, no ascites and no mass. There is tenderness. There is no rebound and no guarding. No hernia.   Musculoskeletal: She exhibits no edema or deformity.   Lymphadenopathy:     She has no cervical adenopathy.     She has no axillary adenopathy.        Right: No inguinal and no supraclavicular adenopathy present.        Left: No inguinal and no supraclavicular adenopathy present.   Neurological: She is alert and oriented to person, place, and time. She exhibits normal muscle tone.   Skin: Skin is warm, dry and intact. No rash noted. No erythema. No pallor.   Psychiatric: She has a normal mood and affect. Her behavior is normal. Thought content normal.   Vitals reviewed.      Assessment/Plan   Renate was seen today for follow-up.    Diagnoses and all orders for this visit:    LLQ abdominal pain    Gallstones    Plan on lap torri when she gets the hysterectomy.

## 2018-03-04 ENCOUNTER — PREP FOR SURGERY (OUTPATIENT)
Dept: OTHER | Facility: HOSPITAL | Age: 38
End: 2018-03-04

## 2018-03-04 DIAGNOSIS — N93.9 ABNORMAL UTERINE BLEEDING (AUB): ICD-10-CM

## 2018-03-04 DIAGNOSIS — N85.2 HYPERTROPHY OF UTERUS: Primary | ICD-10-CM

## 2018-03-04 DIAGNOSIS — R10.2 PELVIC PAIN: ICD-10-CM

## 2018-03-04 DIAGNOSIS — N73.6 PELVIC ADHESIVE DISEASE: ICD-10-CM

## 2018-03-04 RX ORDER — SODIUM CHLORIDE 0.9 % (FLUSH) 0.9 %
1-10 SYRINGE (ML) INJECTION AS NEEDED
Status: CANCELLED | OUTPATIENT
Start: 2018-03-08

## 2018-03-05 ENCOUNTER — TELEPHONE (OUTPATIENT)
Dept: CARDIOLOGY | Facility: CLINIC | Age: 38
End: 2018-03-05

## 2018-03-05 ENCOUNTER — APPOINTMENT (OUTPATIENT)
Dept: PREADMISSION TESTING | Facility: HOSPITAL | Age: 38
End: 2018-03-05

## 2018-03-05 DIAGNOSIS — N85.2 HYPERTROPHY OF UTERUS: ICD-10-CM

## 2018-03-05 DIAGNOSIS — N93.9 ABNORMAL UTERINE BLEEDING (AUB): ICD-10-CM

## 2018-03-05 DIAGNOSIS — N73.6 PELVIC ADHESIVE DISEASE: ICD-10-CM

## 2018-03-05 DIAGNOSIS — R10.2 PELVIC PAIN: ICD-10-CM

## 2018-03-05 LAB
ABO GROUP BLD: NORMAL
BASOPHILS # BLD AUTO: 0.02 10*3/MM3 (ref 0–0.3)
BASOPHILS NFR BLD AUTO: 0.3 % (ref 0–2)
BLD GP AB SCN SERPL QL: NEGATIVE
DEPRECATED RDW RBC AUTO: 46 FL (ref 37–54)
EOSINOPHIL # BLD AUTO: 0.22 10*3/MM3 (ref 0–0.7)
EOSINOPHIL NFR BLD AUTO: 3.6 % (ref 0–5)
ERYTHROCYTE [DISTWIDTH] IN BLOOD BY AUTOMATED COUNT: 14.8 % (ref 11.5–14.5)
HCT VFR BLD AUTO: 37.9 % (ref 37–47)
HGB BLD-MCNC: 11.7 G/DL (ref 12–16)
IMM GRANULOCYTES # BLD: 0.01 10*3/MM3 (ref 0–0.03)
IMM GRANULOCYTES NFR BLD: 0.2 % (ref 0–0.5)
LYMPHOCYTES # BLD AUTO: 2.16 10*3/MM3 (ref 1–3)
LYMPHOCYTES NFR BLD AUTO: 34.9 % (ref 21–51)
MCH RBC QN AUTO: 26.5 PG (ref 27–33)
MCHC RBC AUTO-ENTMCNC: 30.9 G/DL (ref 33–37)
MCV RBC AUTO: 85.9 FL (ref 80–94)
MONOCYTES # BLD AUTO: 0.43 10*3/MM3 (ref 0.1–0.9)
MONOCYTES NFR BLD AUTO: 6.9 % (ref 0–10)
NEUTROPHILS # BLD AUTO: 3.35 10*3/MM3 (ref 1.4–6.5)
NEUTROPHILS NFR BLD AUTO: 54.1 % (ref 30–70)
PLATELET # BLD AUTO: 337 10*3/MM3 (ref 130–400)
PMV BLD AUTO: 9.1 FL (ref 6–10)
RBC # BLD AUTO: 4.41 10*6/MM3 (ref 4.2–5.4)
RH BLD: POSITIVE
WBC NRBC COR # BLD: 6.19 10*3/MM3 (ref 4.5–12.5)

## 2018-03-05 PROCEDURE — 93010 ELECTROCARDIOGRAM REPORT: CPT | Performed by: INTERNAL MEDICINE

## 2018-03-05 PROCEDURE — 86901 BLOOD TYPING SEROLOGIC RH(D): CPT | Performed by: PHYSICIAN ASSISTANT

## 2018-03-05 PROCEDURE — 36415 COLL VENOUS BLD VENIPUNCTURE: CPT

## 2018-03-05 PROCEDURE — 86900 BLOOD TYPING SEROLOGIC ABO: CPT | Performed by: PHYSICIAN ASSISTANT

## 2018-03-05 PROCEDURE — 86850 RBC ANTIBODY SCREEN: CPT | Performed by: PHYSICIAN ASSISTANT

## 2018-03-05 PROCEDURE — 85025 COMPLETE CBC W/AUTO DIFF WBC: CPT | Performed by: PHYSICIAN ASSISTANT

## 2018-03-05 PROCEDURE — 93005 ELECTROCARDIOGRAM TRACING: CPT

## 2018-03-05 RX ORDER — CARVEDILOL 12.5 MG/1
12.5 TABLET ORAL NIGHTLY
COMMUNITY
End: 2018-08-28 | Stop reason: DRUGHIGH

## 2018-03-05 RX ORDER — HYDROCODONE BITARTRATE AND ACETAMINOPHEN 5; 325 MG/1; MG/1
1 TABLET ORAL 3 TIMES DAILY PRN
COMMUNITY
End: 2018-05-01 | Stop reason: ALTCHOICE

## 2018-03-05 RX ORDER — CARVEDILOL 25 MG/1
25 TABLET ORAL EVERY MORNING
COMMUNITY
End: 2018-08-28 | Stop reason: DRUGHIGH

## 2018-03-05 NOTE — DISCHARGE INSTRUCTIONS
0630----3/8/2018  Arrival time    TAKE the following medications the morning of surgery:  All heart or blood pressure medications    HOLD all diabetic medications the morning of surgery as ordered by physician.    Please discontinue all blood thinners and anticoagulants (except aspirin) prior to surgery as per your surgeon and cardiologist instructions.  Aspirin may be continued up to the day prior to surgery.     CHLORHEXIDINE CLOTHS GIVEN WITH INSTRUCTIONS AND FORM TO RETURN TO HOSPITAL    General Instructions:  · Do not eat or drink after midnight: includes water, mints, or gum. You may brush your teeth.  Dental appliances that are removable must be taken out day of surgery.  · Do not smoke, chew tobacco, or drink alcohol.  · Bring medications in original bottles, any inhalers and if applicable your C-PAP/BI-PAP machine.  · Bring any papers given to you in the doctor's office.  · Wear clean comfortable clothes and socks.  · Do not wear contact lenses or make-up. Bring a case for your glasses if applicable.  · Bring crutches or walker if applicable.  · Leave all other valuables and jewelry at home.    If you were given a blood bank ID arm band remember to bring it with you the day of surgery.    Preventing a Surgical Site Infection:  Shower the night before surgery (unless instructed other wise) using a fresh bar of anti-bacterial soap (such as Dial) and clean washcloth. Dry with a clean towel and dress in clean clothing.  For 2 to 3 days before surgery, avoid shaving with a razor near where you will have surgery because the razor can irritate skin and make it easier to develop an infection. Ask your surgeon if you will be receiving antibiotics prior to surgery.  Make sure you, your family, and all healthcare providers clear their hands with soap and water or an alcohol-based hand  before caring for you or your wound.  If at all possible, quit smoking as many days before surgery as you can.    Day of  surgery:  Upon arrival, a Pre-op nurse and Anesthesiologist will review your health history, obtain vital signs, and answer questions you may have. The only belongings needed at this time will be your home medications and if applicable your C-PAP/BI-PAP machine. If you are staying overnight your family can leave the rest of your belongings in the car and bring them to your room later. A Pre-op nurse will start an IV and you may receive medication in preparation for surgery, including something to help you relax. Your family will be able to see you in the Pre-op area. While you are in surgery your family should notify the waiting room  if they leave the waiting room area and provide a contact phone number.    Please be aware that surgery does come with discomfort. We want to make every effort to control your discomfort so please discuss any uncontrolled symptoms with your nurse. Your doctor will most likely have prescribed pain medications.    If you are going home after surgery you will receive individualized written care instructions before being discharged. A responsible adult must drive you to and from the hospital on the day of surgery and stay with you for 24 hours.    If you are staying overnight following surgery, you will be transported to your hospital room following the recovery period.  [facility] has all private rooms.    If you have any questions please call Pre-Admission Testing at [phone].  Deductibles and co-payments are collected on the day of service. Please be prepared to pay the required co-pay, deductible or deposit on the day of service as defined by your plan.

## 2018-03-05 NOTE — TELEPHONE ENCOUNTER
Maxime Jimenez requesting clearance for machot to have a hystrectomy.    According to our records she is not on blood thinners.

## 2018-03-06 ENCOUNTER — APPOINTMENT (OUTPATIENT)
Dept: PREADMISSION TESTING | Facility: HOSPITAL | Age: 38
End: 2018-03-06

## 2018-03-08 ENCOUNTER — ANESTHESIA (OUTPATIENT)
Dept: PERIOP | Facility: HOSPITAL | Age: 38
End: 2018-03-08

## 2018-03-08 ENCOUNTER — HOSPITAL ENCOUNTER (OUTPATIENT)
Facility: HOSPITAL | Age: 38
Discharge: HOME | End: 2018-03-09
Attending: OBSTETRICS & GYNECOLOGY | Admitting: OBSTETRICS & GYNECOLOGY

## 2018-03-08 ENCOUNTER — ANESTHESIA EVENT (OUTPATIENT)
Dept: PERIOP | Facility: HOSPITAL | Age: 38
End: 2018-03-08

## 2018-03-08 DIAGNOSIS — R10.9 ABDOMINAL PAIN: ICD-10-CM

## 2018-03-08 DIAGNOSIS — K81.9 CHOLECYSTITIS: ICD-10-CM

## 2018-03-08 LAB
B-HCG UR QL: NEGATIVE
GLUCOSE BLDC GLUCOMTR-MCNC: 114 MG/DL (ref 70–130)
GLUCOSE BLDC GLUCOMTR-MCNC: 95 MG/DL (ref 70–130)
INTERNAL NEGATIVE CONTROL: NEGATIVE
INTERNAL POSITIVE CONTROL: POSITIVE
Lab: NORMAL

## 2018-03-08 PROCEDURE — 88307 TISSUE EXAM BY PATHOLOGIST: CPT | Performed by: SURGERY

## 2018-03-08 PROCEDURE — 25010000002 ONDANSETRON PER 1 MG: Performed by: NURSE ANESTHETIST, CERTIFIED REGISTERED

## 2018-03-08 PROCEDURE — 25010000002 HYDROMORPHONE PER 4 MG

## 2018-03-08 PROCEDURE — 47562 LAPAROSCOPIC CHOLECYSTECTOMY: CPT | Performed by: SURGERY

## 2018-03-08 PROCEDURE — 94799 UNLISTED PULMONARY SVC/PX: CPT

## 2018-03-08 PROCEDURE — 25010000002 FENTANYL CITRATE (PF) 100 MCG/2ML SOLUTION: Performed by: NURSE ANESTHETIST, CERTIFIED REGISTERED

## 2018-03-08 PROCEDURE — 82962 GLUCOSE BLOOD TEST: CPT

## 2018-03-08 PROCEDURE — G0378 HOSPITAL OBSERVATION PER HR: HCPCS

## 2018-03-08 PROCEDURE — 25010000002 NEOSTIGMINE 10 MG/10ML SOLUTION: Performed by: NURSE ANESTHETIST, CERTIFIED REGISTERED

## 2018-03-08 PROCEDURE — 25010000002 DEXAMETHASONE PER 1 MG: Performed by: NURSE ANESTHETIST, CERTIFIED REGISTERED

## 2018-03-08 PROCEDURE — 88304 TISSUE EXAM BY PATHOLOGIST: CPT | Performed by: SURGERY

## 2018-03-08 PROCEDURE — 25010000002 CEFOXITIN: Performed by: PHYSICIAN ASSISTANT

## 2018-03-08 PROCEDURE — 25010000002 MIDAZOLAM PER 1 MG: Performed by: NURSE ANESTHETIST, CERTIFIED REGISTERED

## 2018-03-08 PROCEDURE — 25010000002 KETOROLAC TROMETHAMINE PER 15 MG: Performed by: OBSTETRICS & GYNECOLOGY

## 2018-03-08 PROCEDURE — 25010000002 PROPOFOL 10 MG/ML EMULSION: Performed by: NURSE ANESTHETIST, CERTIFIED REGISTERED

## 2018-03-08 RX ORDER — FAMOTIDINE 10 MG/ML
INJECTION, SOLUTION INTRAVENOUS AS NEEDED
Status: DISCONTINUED | OUTPATIENT
Start: 2018-03-08 | End: 2018-03-08 | Stop reason: SURG

## 2018-03-08 RX ORDER — IPRATROPIUM BROMIDE AND ALBUTEROL SULFATE 2.5; .5 MG/3ML; MG/3ML
3 SOLUTION RESPIRATORY (INHALATION) ONCE AS NEEDED
Status: DISCONTINUED | OUTPATIENT
Start: 2018-03-08 | End: 2018-03-08 | Stop reason: HOSPADM

## 2018-03-08 RX ORDER — ONDANSETRON 4 MG/1
4 TABLET, ORALLY DISINTEGRATING ORAL EVERY 6 HOURS PRN
Status: DISCONTINUED | OUTPATIENT
Start: 2018-03-08 | End: 2018-03-09 | Stop reason: HOSPADM

## 2018-03-08 RX ORDER — MIDAZOLAM HYDROCHLORIDE 1 MG/ML
INJECTION INTRAMUSCULAR; INTRAVENOUS AS NEEDED
Status: DISCONTINUED | OUTPATIENT
Start: 2018-03-08 | End: 2018-03-08 | Stop reason: SURG

## 2018-03-08 RX ORDER — HYDROMORPHONE HYDROCHLORIDE 1 MG/ML
0.5 INJECTION, SOLUTION INTRAMUSCULAR; INTRAVENOUS; SUBCUTANEOUS
Status: DISCONTINUED | OUTPATIENT
Start: 2018-03-08 | End: 2018-03-09 | Stop reason: HOSPADM

## 2018-03-08 RX ORDER — MEPERIDINE HYDROCHLORIDE 50 MG/ML
12.5 INJECTION INTRAMUSCULAR; INTRAVENOUS; SUBCUTANEOUS
Status: DISCONTINUED | OUTPATIENT
Start: 2018-03-08 | End: 2018-03-08 | Stop reason: HOSPADM

## 2018-03-08 RX ORDER — FENTANYL CITRATE 50 UG/ML
INJECTION, SOLUTION INTRAMUSCULAR; INTRAVENOUS AS NEEDED
Status: DISCONTINUED | OUTPATIENT
Start: 2018-03-08 | End: 2018-03-08 | Stop reason: SURG

## 2018-03-08 RX ORDER — TOPIRAMATE 25 MG/1
50 TABLET ORAL NIGHTLY
Status: DISCONTINUED | OUTPATIENT
Start: 2018-03-08 | End: 2018-03-09 | Stop reason: HOSPADM

## 2018-03-08 RX ORDER — CARVEDILOL 6.25 MG/1
12.5 TABLET ORAL
Status: DISCONTINUED | OUTPATIENT
Start: 2018-03-08 | End: 2018-03-09 | Stop reason: HOSPADM

## 2018-03-08 RX ORDER — NEOSTIGMINE METHYLSULFATE 1 MG/ML
INJECTION, SOLUTION INTRAVENOUS AS NEEDED
Status: DISCONTINUED | OUTPATIENT
Start: 2018-03-08 | End: 2018-03-08 | Stop reason: SURG

## 2018-03-08 RX ORDER — METOCLOPRAMIDE HYDROCHLORIDE 5 MG/ML
10 INJECTION INTRAMUSCULAR; INTRAVENOUS EVERY 6 HOURS PRN
Status: DISCONTINUED | OUTPATIENT
Start: 2018-03-08 | End: 2018-03-09 | Stop reason: HOSPADM

## 2018-03-08 RX ORDER — FLUOXETINE HYDROCHLORIDE 20 MG/1
40 CAPSULE ORAL DAILY
Status: DISCONTINUED | OUTPATIENT
Start: 2018-03-08 | End: 2018-03-09 | Stop reason: HOSPADM

## 2018-03-08 RX ORDER — HYDROMORPHONE HCL 110MG/55ML
PATIENT CONTROLLED ANALGESIA SYRINGE INTRAVENOUS
Status: COMPLETED
Start: 2018-03-08 | End: 2018-03-08

## 2018-03-08 RX ORDER — SODIUM CHLORIDE, SODIUM LACTATE, POTASSIUM CHLORIDE, CALCIUM CHLORIDE 600; 310; 30; 20 MG/100ML; MG/100ML; MG/100ML; MG/100ML
125 INJECTION, SOLUTION INTRAVENOUS CONTINUOUS
Status: DISCONTINUED | OUTPATIENT
Start: 2018-03-08 | End: 2018-03-09 | Stop reason: HOSPADM

## 2018-03-08 RX ORDER — FUROSEMIDE 40 MG/1
40 TABLET ORAL DAILY
Status: DISCONTINUED | OUTPATIENT
Start: 2018-03-08 | End: 2018-03-09 | Stop reason: HOSPADM

## 2018-03-08 RX ORDER — BUPIVACAINE HYDROCHLORIDE 5 MG/ML
INJECTION, SOLUTION EPIDURAL; INTRACAUDAL AS NEEDED
Status: DISCONTINUED | OUTPATIENT
Start: 2018-03-08 | End: 2018-03-08 | Stop reason: HOSPADM

## 2018-03-08 RX ORDER — GLYCOPYRROLATE 0.2 MG/ML
INJECTION INTRAMUSCULAR; INTRAVENOUS AS NEEDED
Status: DISCONTINUED | OUTPATIENT
Start: 2018-03-08 | End: 2018-03-08 | Stop reason: SURG

## 2018-03-08 RX ORDER — ONDANSETRON 2 MG/ML
4 INJECTION INTRAMUSCULAR; INTRAVENOUS ONCE AS NEEDED
Status: DISCONTINUED | OUTPATIENT
Start: 2018-03-08 | End: 2018-03-08 | Stop reason: HOSPADM

## 2018-03-08 RX ORDER — HYDROCODONE BITARTRATE AND ACETAMINOPHEN 5; 325 MG/1; MG/1
1 TABLET ORAL 3 TIMES DAILY PRN
Status: CANCELLED | OUTPATIENT
Start: 2018-03-08

## 2018-03-08 RX ORDER — BUPIVACAINE HYDROCHLORIDE AND EPINEPHRINE 5; 5 MG/ML; UG/ML
INJECTION, SOLUTION EPIDURAL; INTRACAUDAL; PERINEURAL AS NEEDED
Status: DISCONTINUED | OUTPATIENT
Start: 2018-03-08 | End: 2018-03-08 | Stop reason: HOSPADM

## 2018-03-08 RX ORDER — NALOXONE HCL 0.4 MG/ML
0.1 VIAL (ML) INJECTION
Status: DISCONTINUED | OUTPATIENT
Start: 2018-03-08 | End: 2018-03-09 | Stop reason: HOSPADM

## 2018-03-08 RX ORDER — ONDANSETRON 2 MG/ML
INJECTION INTRAMUSCULAR; INTRAVENOUS AS NEEDED
Status: DISCONTINUED | OUTPATIENT
Start: 2018-03-08 | End: 2018-03-08 | Stop reason: SURG

## 2018-03-08 RX ORDER — LEVOTHYROXINE SODIUM 0.03 MG/1
25 TABLET ORAL
Status: DISCONTINUED | OUTPATIENT
Start: 2018-03-08 | End: 2018-03-09 | Stop reason: HOSPADM

## 2018-03-08 RX ORDER — ONDANSETRON 4 MG/1
4 TABLET, FILM COATED ORAL EVERY 6 HOURS PRN
Status: DISCONTINUED | OUTPATIENT
Start: 2018-03-08 | End: 2018-03-09 | Stop reason: HOSPADM

## 2018-03-08 RX ORDER — MAGNESIUM HYDROXIDE 1200 MG/15ML
LIQUID ORAL AS NEEDED
Status: DISCONTINUED | OUTPATIENT
Start: 2018-03-08 | End: 2018-03-08 | Stop reason: HOSPADM

## 2018-03-08 RX ORDER — IBUPROFEN 800 MG/1
800 TABLET ORAL 3 TIMES DAILY
Status: DISCONTINUED | OUTPATIENT
Start: 2018-03-08 | End: 2018-03-09 | Stop reason: HOSPADM

## 2018-03-08 RX ORDER — CARVEDILOL 25 MG/1
25 TABLET ORAL EVERY MORNING
Status: DISCONTINUED | OUTPATIENT
Start: 2018-03-08 | End: 2018-03-09 | Stop reason: HOSPADM

## 2018-03-08 RX ORDER — OXYCODONE AND ACETAMINOPHEN 10; 325 MG/1; MG/1
1 TABLET ORAL EVERY 4 HOURS PRN
Status: DISCONTINUED | OUTPATIENT
Start: 2018-03-08 | End: 2018-03-09 | Stop reason: HOSPADM

## 2018-03-08 RX ORDER — ROCURONIUM BROMIDE 10 MG/ML
INJECTION, SOLUTION INTRAVENOUS AS NEEDED
Status: DISCONTINUED | OUTPATIENT
Start: 2018-03-08 | End: 2018-03-08 | Stop reason: SURG

## 2018-03-08 RX ORDER — SUMATRIPTAN 50 MG/1
50 TABLET, FILM COATED ORAL ONCE AS NEEDED
Status: DISCONTINUED | OUTPATIENT
Start: 2018-03-08 | End: 2018-03-09 | Stop reason: HOSPADM

## 2018-03-08 RX ORDER — SODIUM CHLORIDE 9 MG/ML
INJECTION, SOLUTION INTRAVENOUS AS NEEDED
Status: DISCONTINUED | OUTPATIENT
Start: 2018-03-08 | End: 2018-03-08 | Stop reason: HOSPADM

## 2018-03-08 RX ORDER — SODIUM CHLORIDE 0.9 % (FLUSH) 0.9 %
1-10 SYRINGE (ML) INJECTION AS NEEDED
Status: DISCONTINUED | OUTPATIENT
Start: 2018-03-08 | End: 2018-03-08 | Stop reason: HOSPADM

## 2018-03-08 RX ORDER — FENTANYL CITRATE 50 UG/ML
50 INJECTION, SOLUTION INTRAMUSCULAR; INTRAVENOUS
Status: DISCONTINUED | OUTPATIENT
Start: 2018-03-08 | End: 2018-03-08 | Stop reason: HOSPADM

## 2018-03-08 RX ORDER — PANTOPRAZOLE SODIUM 40 MG/1
40 TABLET, DELAYED RELEASE ORAL NIGHTLY
Status: DISCONTINUED | OUTPATIENT
Start: 2018-03-08 | End: 2018-03-09 | Stop reason: HOSPADM

## 2018-03-08 RX ORDER — PROPOFOL 10 MG/ML
VIAL (ML) INTRAVENOUS AS NEEDED
Status: DISCONTINUED | OUTPATIENT
Start: 2018-03-08 | End: 2018-03-08 | Stop reason: SURG

## 2018-03-08 RX ORDER — SODIUM CHLORIDE, SODIUM LACTATE, POTASSIUM CHLORIDE, CALCIUM CHLORIDE 600; 310; 30; 20 MG/100ML; MG/100ML; MG/100ML; MG/100ML
125 INJECTION, SOLUTION INTRAVENOUS CONTINUOUS
Status: DISCONTINUED | OUTPATIENT
Start: 2018-03-08 | End: 2018-03-08 | Stop reason: HOSPADM

## 2018-03-08 RX ORDER — LIDOCAINE HYDROCHLORIDE 20 MG/ML
INJECTION, SOLUTION INFILTRATION; PERINEURAL AS NEEDED
Status: DISCONTINUED | OUTPATIENT
Start: 2018-03-08 | End: 2018-03-08 | Stop reason: SURG

## 2018-03-08 RX ORDER — AMITRIPTYLINE HYDROCHLORIDE 25 MG/1
25 TABLET, FILM COATED ORAL
Status: DISCONTINUED | OUTPATIENT
Start: 2018-03-08 | End: 2018-03-09 | Stop reason: HOSPADM

## 2018-03-08 RX ORDER — OXYCODONE HYDROCHLORIDE AND ACETAMINOPHEN 5; 325 MG/1; MG/1
1 TABLET ORAL EVERY 4 HOURS PRN
Status: DISCONTINUED | OUTPATIENT
Start: 2018-03-08 | End: 2018-03-09 | Stop reason: HOSPADM

## 2018-03-08 RX ORDER — KETOROLAC TROMETHAMINE 30 MG/ML
30 INJECTION, SOLUTION INTRAMUSCULAR; INTRAVENOUS EVERY 6 HOURS PRN
Status: DISCONTINUED | OUTPATIENT
Start: 2018-03-08 | End: 2018-03-09 | Stop reason: HOSPADM

## 2018-03-08 RX ORDER — DEXAMETHASONE SODIUM PHOSPHATE 4 MG/ML
INJECTION, SOLUTION INTRA-ARTICULAR; INTRALESIONAL; INTRAMUSCULAR; INTRAVENOUS; SOFT TISSUE AS NEEDED
Status: DISCONTINUED | OUTPATIENT
Start: 2018-03-08 | End: 2018-03-08 | Stop reason: SURG

## 2018-03-08 RX ORDER — ONDANSETRON 2 MG/ML
4 INJECTION INTRAMUSCULAR; INTRAVENOUS EVERY 6 HOURS PRN
Status: DISCONTINUED | OUTPATIENT
Start: 2018-03-08 | End: 2018-03-09 | Stop reason: HOSPADM

## 2018-03-08 RX ORDER — PREGABALIN 75 MG/1
150 CAPSULE ORAL NIGHTLY
Status: DISCONTINUED | OUTPATIENT
Start: 2018-03-08 | End: 2018-03-09 | Stop reason: HOSPADM

## 2018-03-08 RX ORDER — ZOLPIDEM TARTRATE 5 MG/1
5 TABLET ORAL NIGHTLY PRN
Status: DISCONTINUED | OUTPATIENT
Start: 2018-03-08 | End: 2018-03-09 | Stop reason: HOSPADM

## 2018-03-08 RX ADMIN — OXYCODONE HYDROCHLORIDE AND ACETAMINOPHEN 1 TABLET: 10; 325 TABLET ORAL at 21:01

## 2018-03-08 RX ADMIN — FENTANYL CITRATE 100 MCG: 50 INJECTION INTRAMUSCULAR; INTRAVENOUS at 08:56

## 2018-03-08 RX ADMIN — IBUPROFEN 800 MG: 800 TABLET ORAL at 21:01

## 2018-03-08 RX ADMIN — GLYCOPYRROLATE 0.6 MG: 0.2 INJECTION, SOLUTION INTRAMUSCULAR; INTRAVENOUS at 08:53

## 2018-03-08 RX ADMIN — DEXAMETHASONE SODIUM PHOSPHATE 8 MG: 4 INJECTION, SOLUTION INTRAMUSCULAR; INTRAVENOUS at 07:49

## 2018-03-08 RX ADMIN — FENTANYL CITRATE 50 MCG: 50 INJECTION, SOLUTION INTRAMUSCULAR; INTRAVENOUS at 09:54

## 2018-03-08 RX ADMIN — FENTANYL CITRATE 50 MCG: 50 INJECTION, SOLUTION INTRAMUSCULAR; INTRAVENOUS at 09:48

## 2018-03-08 RX ADMIN — PANTOPRAZOLE SODIUM 40 MG: 40 TABLET, DELAYED RELEASE ORAL at 21:01

## 2018-03-08 RX ADMIN — KETOROLAC TROMETHAMINE 30 MG: 30 INJECTION, SOLUTION INTRAMUSCULAR at 10:34

## 2018-03-08 RX ADMIN — SODIUM CHLORIDE, POTASSIUM CHLORIDE, SODIUM LACTATE AND CALCIUM CHLORIDE 125 ML/HR: 600; 310; 30; 20 INJECTION, SOLUTION INTRAVENOUS at 17:33

## 2018-03-08 RX ADMIN — FUROSEMIDE 40 MG: 40 TABLET ORAL at 14:30

## 2018-03-08 RX ADMIN — FAMOTIDINE 20 MG: 10 INJECTION, SOLUTION INTRAVENOUS at 07:45

## 2018-03-08 RX ADMIN — ROCURONIUM BROMIDE 30 MG: 10 INJECTION INTRAVENOUS at 07:49

## 2018-03-08 RX ADMIN — OXYCODONE HYDROCHLORIDE AND ACETAMINOPHEN 1 TABLET: 10; 325 TABLET ORAL at 16:08

## 2018-03-08 RX ADMIN — SODIUM CHLORIDE, POTASSIUM CHLORIDE, SODIUM LACTATE AND CALCIUM CHLORIDE 125 ML/HR: 600; 310; 30; 20 INJECTION, SOLUTION INTRAVENOUS at 07:10

## 2018-03-08 RX ADMIN — PREGABALIN 150 MG: 75 CAPSULE ORAL at 21:01

## 2018-03-08 RX ADMIN — PROPOFOL 80 MG: 10 INJECTION, EMULSION INTRAVENOUS at 08:58

## 2018-03-08 RX ADMIN — FENTANYL CITRATE 50 MCG: 50 INJECTION INTRAMUSCULAR; INTRAVENOUS at 09:10

## 2018-03-08 RX ADMIN — CARVEDILOL 12.5 MG: 6.25 TABLET, FILM COATED ORAL at 21:02

## 2018-03-08 RX ADMIN — AMITRIPTYLINE HYDROCHLORIDE 25 MG: 25 TABLET, FILM COATED ORAL at 21:03

## 2018-03-08 RX ADMIN — FLUOXETINE 40 MG: 20 CAPSULE ORAL at 14:30

## 2018-03-08 RX ADMIN — HYDROMORPHONE HYDROCHLORIDE 2 MG: 2 INJECTION, SOLUTION INTRAMUSCULAR; INTRAVENOUS; SUBCUTANEOUS at 12:07

## 2018-03-08 RX ADMIN — LIDOCAINE HYDROCHLORIDE 60 MG: 20 INJECTION, SOLUTION INFILTRATION; PERINEURAL at 07:49

## 2018-03-08 RX ADMIN — MIDAZOLAM HYDROCHLORIDE 2 MG: 1 INJECTION, SOLUTION INTRAMUSCULAR; INTRAVENOUS at 07:45

## 2018-03-08 RX ADMIN — CEFOXITIN 2 G: 2 INJECTION, POWDER, FOR SOLUTION INTRAVENOUS at 07:45

## 2018-03-08 RX ADMIN — NEOSTIGMINE METHYLSULFATE 3 MG: 1 INJECTION, SOLUTION INTRAVENOUS at 08:53

## 2018-03-08 RX ADMIN — ONDANSETRON 4 MG: 2 INJECTION, SOLUTION INTRAMUSCULAR; INTRAVENOUS at 07:49

## 2018-03-08 RX ADMIN — SODIUM CHLORIDE, POTASSIUM CHLORIDE, SODIUM LACTATE AND CALCIUM CHLORIDE: 600; 310; 30; 20 INJECTION, SOLUTION INTRAVENOUS at 08:07

## 2018-03-08 RX ADMIN — METFORMIN HYDROCHLORIDE 500 MG: 500 TABLET ORAL at 17:32

## 2018-03-08 RX ADMIN — FENTANYL CITRATE 100 MCG: 50 INJECTION INTRAMUSCULAR; INTRAVENOUS at 07:45

## 2018-03-08 RX ADMIN — PROPOFOL 150 MG: 10 INJECTION, EMULSION INTRAVENOUS at 07:49

## 2018-03-08 RX ADMIN — CARVEDILOL 25 MG: 25 TABLET, FILM COATED ORAL at 14:31

## 2018-03-08 RX ADMIN — TOPIRAMATE 50 MG: 25 TABLET, FILM COATED ORAL at 21:01

## 2018-03-08 NOTE — ANESTHESIA POSTPROCEDURE EVALUATION
Patient: Renate Mitchell    Procedure Summary     Date Anesthesia Start Anesthesia Stop Room / Location    03/08/18 0745 0918  [facility] OR 04 / [facility]       Procedure Diagnosis Surgeon Provider    TOTAL LAPAROSCOPIC HYSTERECTOMY BILATERAL SALPINGECTOMY AND LEFT OOPHORECTOMY WITH DAVINCI SI ROBOT (N/A Abdomen); CHOLECYSTECTOMY LAPAROSCOPIC (N/A Abdomen) (R10.2) Bill Greenwood, DO; MD Demetris Mcdowell MD          Anesthesia Type: general  Last vitals  BP   151/86 (03/08/18 1000)   Temp   97.4 °F (36.3 °C) (03/08/18 0950)   Pulse   87 (03/08/18 1000)   Resp   9 (03/08/18 1000)     SpO2   95 % (03/08/18 1000)     Post Anesthesia Care and Evaluation    Patient location during evaluation: PACU  Patient participation: complete - patient participated  Level of consciousness: sleepy but conscious  Pain score: 2  Pain management: adequate  Airway patency: patent  Anesthetic complications: No anesthetic complications  PONV Status: controlled  Cardiovascular status: acceptable  Respiratory status: acceptable and nasal cannula  Hydration status: euvolemic  No anesthesia care post op

## 2018-03-08 NOTE — PLAN OF CARE
Problem: Patient Care Overview (Adult)  Goal: Plan of Care Review  Outcome: Ongoing (interventions implemented as appropriate)   03/08/18 0650   Coping/Psychosocial Response Interventions   Plan Of Care Reviewed With patient

## 2018-03-08 NOTE — OP NOTE
TOTAL LAPAROSCOPIC HYSTERECTOMY WITH DAVINCI SI ROBOT  Procedure Note    Renate Mitchell  3/8/2018    Pre-op Diagnosis:   Pelvic pain  Uterine hypertrophy    Post-op Diagnosis:     Same plus pelvic adhesions    Procedure(s):  TOTAL LAPAROSCOPIC HYSTERECTOMY BILATERAL SALPINGECTOMY AND LEFT OOPHORECTOMY WITH DAVINCI SI ROBOT  CHOLECYSTECTOMY LAPAROSCOPIC    Surgeon(s):  DO Artie Mckinnon MD    Anesthesia: General        Estimated Blood Loss: minimal    Specimens:                  Order Name Source Comment Collection Info Order Time   PREGNANCY, URINE    3/8/2018  6:11 AM   SURGICAL PATHOLOGY EXAM Gallbladder  Collected By: Artie Oneill MD 3/8/2018  8:05 AM    Collection Date  3/8/2018       Collection Time   8:05 AM      TISSUE PATHOLOGY EXAM Uterus, Cervix, Bilateral Fallopian Tubes   Collected By: Bill Greenwood DO 3/8/2018  8:43 AM         Procedure:  The patient was taken to the operating room after informed written consent was obtained. General anesthesia was induced and the patient was placed in the dorsal lithotomy position. The patient was sterily prepared and draped and a Castro catheter was placed into the bladder. Next the MICHAEL manipulator was placed into the uterus with the KOH cup fitting snugly around the cervix. Once this was in place the surgeon was regloved and attention was made to the abdomen.     Dr. Oneill then performed a laparoscopic cholecystectomy and placed the trochars.  I did replace the midline trocar with a robotic trocar and we also placed in the left lower quadrant.     All trochars used were 8mm robotic trochars. We then placed the patient in Trendelenburg and docked the robot. The remainder of the procedure was done under robotic guidance.    There were some adhesions from the left colon to the left pelvic sidewall and round ligament these were taken down using sharp dissection with the electrocautery.   First we picked up the left fallopian tube  and cauterized under the ovary across the infundibulopelvic ligament using the PK forceps and cutting using the scissors. We took the same dissection across the broad ligament and then when across the round ligament. We did all the cauterizing using the PK forceps and cutting using monopolar scissors. We then took the dissection to the point where the broad ligament divided anteriorly and posteriorly. Anteriorly we took the dissection across to make a bladder flap. We took it posteriorly as well skeletonizing the uterine artery. We then doubly cauterized and cut the uterine artery. We then took the bladder down using sharp dissection and the monopolar scissors.  We did remove the right fallopian tube but we left lot the right ovary as it appeared to be normal except for a simple cyst which we aspirated.  We then took serial bites on each side of the uterus taking the cardinal and the uterosacral ligaments. Once we got all the attachments of the uterus down we made a colpotomy anteriorly and carried it all away around. We then delivered the specimen into the vagina and left it there to keep the pneumoperitoneum. Next we made sure everything was hemostatic. We closed the vagina using a 2-0 monocryl V lock suture making sure to get good bites of the vaginal cuff angles and attaching them to the uterosacral ligaments bilaterally. We then ran the rest of the vaginal cuff taking 1 cm bots of vagina. We then re-approximated the peritoneum over the vaginal cuff using the same suture. We irrigated once again and made sure everything was hemostatic. We them placed some Marcaine into the pelvis for postoperative pain control. We then surveyed the remainder of the abdomen and pelvis and it was grossly normal. We removed the CO2 gas and closed the skin incision with a 4-0 monocryl and placed surgical adhesive on the skin.    Findings: The uterus was enlarged there were adhesions from the left colon to left pelvic sidewall.  The  patient also had adhesions from the bladder to the anterior uterus as she had had 3 previous  sections.    Complications: none    Grafts or Implants: NA    Bill Greenwood DO     Date: 3/8/2018  Time: 10:14 AM

## 2018-03-08 NOTE — PLAN OF CARE
Problem: Perioperative Period (Adult)  Goal: Signs and Symptoms of Listed Potential Problems Will be Absent or Manageable (Perioperative Period)  Outcome: Ongoing (interventions implemented as appropriate)   03/08/18 0646   Perioperative Period   Problems Assessed (Perioperative Period) pain   Problems Present (Perioperative Period) pain

## 2018-03-08 NOTE — ANESTHESIA PROCEDURE NOTES
Airway  Urgency: elective    Date/Time: 3/8/2018 7:49 AM  Airway not difficult    General Information and Staff    Patient location during procedure: OR  Anesthesiologist: YANNICK MCDONALD  CRNA: MIGDALIA GREEN    Indications and Patient Condition  Indications for airway management: airway protection    Preoxygenated: yes  MILS maintained throughout  Mask difficulty assessment: 2 - vent by mask + OA or adjuvant +/- NMBA    Final Airway Details  Final airway type: endotracheal airway      Successful airway: ETT  Cuffed: yes   Successful intubation technique: direct laryngoscopy  Facilitating devices/methods: intubating stylet  Endotracheal tube insertion site: oral  Blade: Lashell  Blade size: #3  ETT size: 7.5 mm  Cormack-Lehane Classification: grade IIa - partial view of glottis  Placement verified by: chest auscultation, capnometry and palpation of cuff   Cuff volume (mL): 6  Measured from: lips  ETT to lips (cm): 22  Number of attempts at approach: 1    Additional Comments  Dentition as preop. No complications noted. Patient tolerated well.  ETT secured.

## 2018-03-08 NOTE — H&P
PATIENT:  Renate Mitchell  YOB: 1980  DATE:   2018 2:45 PM   VISIT TYPE: Pre-Operative Visit        Chief Complaint:  Pelvic Pain, AUB, Uterine hypertrophy, Hx of pelvic adhesions      History of Present Illness:    37 yoa  female  who presents to the office with concerns of lower abdominopelvic pain greater on her left side, worse during intercourse, tried heating pad and pain meds from pcp, no relief.   pt had normal colonoscopy a few months ago.  Pt also with concerns of  heavy irregular bleeding, wearing 2 pads at once, 7-10days of heavy bleeding, spotting for 2-2 1/2 weeks after periods.  Pt with PSH consistent with Diagnostic laparoscopy with Dr. Lim and was told she needed hysterectomy due to scar tissue and adhesions greater on the right side vs left. Previous C/S x 3 and BTL.  Has history of nonhodgkins lymphoma in remission for ten years.  Also had a pericardial window to have a LN biopsied behind her heart.  She recently saw Dr Oneill, and he wants to remove gallbladder as well.  It was decided to perform Robotic Total Laparoscopic Hysterectomy with possible bilateral salpingo-oophorectomy.  Pt states that if it is at all possible to keep one ovary to prevent early menopause she would like but if both removed if they need to go due to extensive adhesive disease or possible concerns of endometriosis.  Pt presents for pre-op appt today, Pt educated on R/B and complications of procedure.  All questions answered.       At pre-op and PAT anesthesia requesting cardiac consent for procedure.  Rhoda and Patient are trying to contact cardiologist today or tomorrow for consent.  EKG performed at PAT.        Screening Tools  Other Screenings:  Date Instrument Score Severity/Interpretation MDD Classification   2018 Patient Health Questionnaire (PHQ-2) 0         Gynecologic History:  Last menses was 2018.   OBSTETRIC HISTORY    :3.      Parity: Term:3.   Pre-Term: 0.  : 0.  Living: 3.    Full term: 3.  C-sections: 3.  Living: 3.    Past Systemic History    Medical History (Reviewed,updated)  Disease Onset Date Comments   Cardiovascular disease     Diabetes type 2     NonHodgkin's disease     Thyroid disease       Surgical History/Management (Reviewed,updated)  Management Date Comments   bone marrow bx     bx of the heart     chemo port     colonoscopy     csection x3     laparoscopy     pericardia window     pt states she has a tumor on heart/Tachycardia     Bilateral tubal ligation     Diagnostics History:  ActStatus Study Ordered Completed Interpretation  Result / Report   completed TRANSVAGINAL US, NON-OB 01/15/2018 2018          Patient Status   Completed with information received for patient transitioning into care.     Medication Reconciliation  Medications reconciled today.  Medication Reviewed  Adherence Medication Name Sig Desc Elsewhere Status   taking as directed hydrocodone 5 mg-acetaminophen 325 mg tablet take 1 tablet by oral route  every 6 hours as needed for pain N Verified   taking as directed carvedilol 12.5 mg tablet take 1 tablet by oral route 2 times every day with food N Verified   taking as directed furosemide 40 mg tablet take 1 tablet by oral route  every day N Verified   taking as directed levothyroxine 25 mcg tablet take 1 tablet by oral route  every day N Verified   taking as directed sumatriptan 50 mg tablet take 1 tablet by oral route after onset of migraine; may repeat after 2 hours if headache returns,not to exceed 200mg in 24hrs N Verified   taking as directed metformin 500 mg tablet take 1 tablet by oral route 2 times every day with morning and evening meals N Verified   taking as directed fluoxetine 20 mg capsule take 1 capsule by oral route  every day in the morning N Verified   taking as directed amitriptyline 25 mg tablet take 1 tablet by oral route  every day at bedtime N Verified   taking as directed Vitamin D2  50,000 unit capsule take 1 capsule by oral route  every week N Verified   taking as directed Prilosec OTC 20 mg tablet,delayed release every night N Verified   taking as directed Lyrica 150 mg capsule take 1 capsule by oral route 2 times every day N Verified   taking as directed Topamax 25 mg tablet take 2 tablet by oral route 2 times every day in the morning and evening N Verified   taking as directed Klonopin 0.5 mg tablet take 1 tablet by oral route 3 times every day N Verified   taking as directed omeprazole 40 mg capsule,delayed release take 1 capsule by oral route  every day before a meal N Verified   taking as directed tizanidine 4 mg capsule take 1 capsule by oral route 3 times every day N Verified   taking as directed Vitamin D2 50,000 unit capsule take 1 capsule by oral route  every week N Verified     Allergies:  Ingredient Reaction Medication Name Comment   GABAPENTIN  Neurontin    DULOXETINE HCL  Cymbalta    OXYCODONE HCL  Tylox    ACETAMINOPHEN  Tylox    CODEINE          Past Medical/Surgical Hx:  Cardiovascular disease    Diabetes type 2    NonHodgkin's disease    Thyroid disease      Family History  (Reviewed, updated)  Relationship Family Member Name  Age at Death Condition Onset Age Cause of Death   Father    Hypertension  N   Maternal aunt    Diabetes mellitus  N   Maternal aunt    Cancer, breast  N   Maternal grandfather    Cardiovascular disease  N   Mother    Cardiovascular disease  N   Mother    Hypertension  N   Niece    Diabetes mellitus  N   Paternal aunt    Cancer, breast  N   Paternal grandfather    Cancer, lung  N   Paternal grandmother    Thyroid disease  N   M    Social History:  (Reviewed, updated)  Tobacco use reviewed.  Preferred language is English.    MARITAL STATUS/FAMILY/SOCIAL SUPPORT  Currently .    Tobacco use status: Never smoked tobacco.  Smoking status: Never smoker.    SMOKING STATUS  Use Status Type Smoking Status Usage Per Day Years Used Total Pack Years    no/never  Never smoker          ALCOHOL  There is no history of alcohol use.   Islam/SPIRITUAL  The patient has None Holiness affiliation.        Review of Systems  System Neg/Pos Details   Constitutional Negative Chills and fever.   Respiratory Negative Cough and dyspnea.   Cardio Negative Chest pain and irregular heartbeat/palpitations.   GI Positive Abdominal pain, Change in stool pattern.   GI Negative Nausea and vomiting.    Negative Dysuria and hematuria.   Psych Negative Feeling down, depressed or hopeless and little interest or pleasure in doing things.   Reproductive Positive Dyspareunia, Irregular menses, Pelvic pain.       Vital Signs     Last menses was 03/01/2018.   Time BP mm/Hg Pulse /min Resp /min Temp F Ht ft Ht in Ht cm Wt lb Wt kg BMI kg/m2 BSA m2 O2 Sat%   2:38 /80 94 18 97.5 5 8 172.72 219.4 99.518 33.36 2.19 97     Measured By  Time Measured by   2:38 PM Saige Benites   Screening Summary:  The following were reviewed: tobacco use, alcohol use and drugs of abuse    Physical Exam  Exam Findings Details   GYN Exam Comments no guarding   Constitutional * Overall appearance - WNWD NAD.   Respiratory Normal Inspection - Normal. Auscultation - Normal. Effort - Normal.   Cardiovascular Normal Regular rhythm.  No murmurs, gallops, or rubs.   Abdomen * Abdominal tenderness-LLQ, pelvic.   Abdomen Normal Anterior palpation -  No rebound. No hepatic enlargement. No hernia.   Genitourinary * Pelvic deferred. Rectal deferred.   Skin Normal Inspection - Normal.   Extremity Normal No edema.   Psychiatric Normal Oriented to time, place, person and situation. Appropriate mood and affect.       Assessment/Plan  # Detail Type Description    1. Assessment Pre-op exam (Z01.818).    Impression Pt scheduled for Total Robotic Hysterectomy with possible Bilateral Salpingo-Oophorectomy with Dr. Greenwood.  To be at [facility] at 6:30 day of the procedure.  Pt educated on R/B and complications of procedure.  All  questions answered..    Patient Plan Pt to be at the hospital at 6:30 day of procedure.  Pt educated to not eat or drink after midnight the day before surgery which includes water,mint or gum. You may brush your teeth.  Do NOT smoke, chew tobacco, or drink alcohol within 24 hours prior to surgery.  Wear clean, comfortable clothes and socks. No NOt wear contact lenses or make-up or dark nail polish.  Bring a case for your glasses if applicable.  Please keep your (red band - blood bank armband) and continue to wear until discharged after surgery.  Call if Questions Regarding Surgery         2. Assessment Abnormal uterine bleeding (AUB) (N93.9).         3. Assessment Pelvic pain in female (R10.2).         4. Assessment Uterine hypertrophy (N85.2).         5. 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         6. Assessment Body mass index (BMI) 33.0-33.9, adult (Z68.33).         7. Assessment Dietary counseling and surveillance (Z71.3).    Plan Orders Today's instructions / counseling include(s) Dietary management education, guidance, and counseling.Giving encouragement to exercise          Electronically signed by:     Eric Stoll  03/07/2018 10:17 PM   Document generated by:  Eric Stoll 03/07/2018 10:17 PM

## 2018-03-08 NOTE — INTERVAL H&P NOTE
H&P reviewed. The patient was examined and there are no changes to the H&P.   Pt. Had cardiac clearance as well.

## 2018-03-08 NOTE — PLAN OF CARE
Problem: Hysterectomy (Adult)  Intervention: Support Surgical Recovery   03/08/18 1044   Promote Aggressive Pulmonary Hygiene/Secretion Management   Cough And Deep Breathing done with encouragement   Activity   Activity Type bedrest with bathroom privileges   Support Surgical/Anesthesia Recovery   Venous Thromboembolism Prevent/Manage sequential compression devices on;compression stockings on   Respiratory Interventions   Airway/Ventilation Management calming measures promoted       Goal: Signs and Symptoms of Listed Potential Problems Will be Absent or Manageable (Hysterectomy)  Outcome: Ongoing (interventions implemented as appropriate)   03/08/18 1044   Hysterectomy   Problems Assessed (Hysterectomy) all   Problems Present (Hysterectomy) none

## 2018-03-08 NOTE — PLAN OF CARE
Problem: Cholecystitis/Cholecystectomy (Adult)  Intervention: Promote Pulmonary Hygiene and Secretion Clearance   03/08/18 1043   Promote Aggressive Pulmonary Hygiene/Secretion Management   Cough And Deep Breathing done with encouragement   Positioning   Head Of Bed (HOB) Position HOB at 30 degrees   Activity   Activity Type activity adjusted per tolerance;bedrest with bathroom privileges   Incentive Spirometer   Administration (Incentive Spirometer) done with encouragement       Goal: Signs and Symptoms of Listed Potential Problems Will be Absent or Manageable (Cholecystitis/Cholecystectomy)  Outcome: Ongoing (interventions implemented as appropriate)   03/08/18 1043   Cholecystitis/Cholecystectomy   Problems Assessed (Cholecystitis/Cholecystectomy) all   Problems Present (Cholecystitis/Cholecystectomy) none

## 2018-03-08 NOTE — ANESTHESIA PREPROCEDURE EVALUATION
Anesthesia Evaluation     Patient summary reviewed and Nursing notes reviewed   no history of anesthetic complications:  NPO Solid Status: > 8 hours  NPO Liquid Status: > 8 hours           Airway   Mallampati: III  TM distance: >3 FB  Neck ROM: full  no difficulty expected  Dental - normal exam     Pulmonary - negative pulmonary ROS and normal exam   (-) not a smoker  Cardiovascular - normal exam    ECG reviewed  Patient on routine beta blocker and Beta blocker given within 24 hours of surgery    (+) hypertension,     ROS comment: Cardiac clearance from Dr. Arevalo in [place]    Neuro/Psych  (+) headaches, psychiatric history Anxiety and Depression,     GI/Hepatic/Renal/Endo    (+)   diabetes mellitus type 2,     Musculoskeletal     Abdominal  - normal exam    Bowel sounds: normal.   Substance History - negative use     OB/GYN negative ob/gyn ROS         Other   (+) arthritis   history of cancer remission    ROS/Med Hx Other: Hx Non Hodgkins lymphoma s/p radiation and chemo                Anesthesia Plan    ASA 3     general     intravenous induction   Anesthetic plan and risks discussed with patient.    Plan discussed with CRNA.

## 2018-03-08 NOTE — OP NOTE
CHOLECYSTECTOMY LAPAROSCOPIC  Procedure Note    Renate Mitchell  3/8/2018    Pre-op Diagnosis: cholecystitis  R10.2    Post-op Diagnosis: same        Procedure(s):  CHOLECYSTECTOMY LAPAROSCOPIC    Surgeon(s):  DO Artie Mckinnon MD    Anesthesia: General    Staff:   Circulator: Gracie Vernon RN; Raquel Marshall RN  Scrub Person: Ellieivonne Bentley  Assistant: Yanet De Los Santos CSA    Estimated Blood Loss: minimal    Specimens:                  Order Name Source Comment Collection Info Order Time   PREGNANCY, URINE    3/8/2018  6:11 AM   SURGICAL PATHOLOGY EXAM Gallbladder  Collected By: Artie Oneill MD 3/8/2018  8:05 AM    Collection Date  3/8/2018       Collection Time   8:05 AM            Drains:           Procedure: The patient had the abdomen prepped and draped and a 5 mm port placed at the umbilicus. A 11 mm port placed in the upper midline and a 8 mm port placed in the right lateral abdomen. There was significant fatty liver but minimal adhesions on the gallbladder. The cystic duct and artery dissected out, clipped and divided. The gallbladder dissected off the liver with cautery and removed from the upper midline port. The area was irrigated and suctioned and then the patient repositioned for the hysterectomy portion of the procedure.    Findings: cholecystitis    Complications: none   Grafts / Implants N/A    Artie Oneill MD     Date: 3/8/2018  Time: 8:13 AM

## 2018-03-08 NOTE — PLAN OF CARE
Problem: Patient Care Overview (Adult)  Goal: Discharge Needs Assessment  Outcome: Ongoing (interventions implemented as appropriate)   03/08/18 0651   Discharge Needs Assessment   Concerns To Be Addressed no discharge needs identified   Readmission Within The Last 30 Days no previous admission in last 30 days   Equipment Needed After Discharge none   Discharge Disposition home or self-care   Current Health   Anticipated Changes Related to Illness none   Self-Care   Equipment Currently Used at Home none   Living Environment   Transportation Available car

## 2018-03-09 VITALS
SYSTOLIC BLOOD PRESSURE: 121 MMHG | DIASTOLIC BLOOD PRESSURE: 46 MMHG | TEMPERATURE: 98 F | OXYGEN SATURATION: 98 % | HEIGHT: 64 IN | WEIGHT: 220 LBS | HEART RATE: 105 BPM | BODY MASS INDEX: 37.56 KG/M2 | RESPIRATION RATE: 18 BRPM

## 2018-03-09 LAB
DEPRECATED RDW RBC AUTO: 49.5 FL (ref 37–54)
ERYTHROCYTE [DISTWIDTH] IN BLOOD BY AUTOMATED COUNT: 15.5 % (ref 11.5–14.5)
HCT VFR BLD AUTO: 34.7 % (ref 37–47)
HGB BLD-MCNC: 10.4 G/DL (ref 12–16)
MCH RBC QN AUTO: 26.6 PG (ref 27–33)
MCHC RBC AUTO-ENTMCNC: 30 G/DL (ref 33–37)
MCV RBC AUTO: 88.7 FL (ref 80–94)
PLATELET # BLD AUTO: 323 10*3/MM3 (ref 130–400)
PMV BLD AUTO: 9.2 FL (ref 6–10)
RBC # BLD AUTO: 3.91 10*6/MM3 (ref 4.2–5.4)
WBC NRBC COR # BLD: 7.38 10*3/MM3 (ref 4.5–12.5)

## 2018-03-09 PROCEDURE — 85027 COMPLETE CBC AUTOMATED: CPT | Performed by: OBSTETRICS & GYNECOLOGY

## 2018-03-09 PROCEDURE — 25010000002 ENOXAPARIN PER 10 MG: Performed by: OBSTETRICS & GYNECOLOGY

## 2018-03-09 PROCEDURE — G0378 HOSPITAL OBSERVATION PER HR: HCPCS

## 2018-03-09 RX ORDER — DOCUSATE CALCIUM 240 MG
240 CAPSULE ORAL DAILY
Qty: 30 CAPSULE | Refills: 1 | Status: SHIPPED | OUTPATIENT
Start: 2018-03-09 | End: 2018-05-01 | Stop reason: ALTCHOICE

## 2018-03-09 RX ORDER — IBUPROFEN 600 MG/1
600 TABLET ORAL EVERY 6 HOURS PRN
Qty: 30 TABLET | Refills: 0 | Status: SHIPPED | OUTPATIENT
Start: 2018-03-09 | End: 2018-04-08

## 2018-03-09 RX ORDER — OXYCODONE HYDROCHLORIDE AND ACETAMINOPHEN 5; 325 MG/1; MG/1
1 TABLET ORAL EVERY 4 HOURS PRN
Qty: 30 TABLET | Refills: 0 | Status: SHIPPED | OUTPATIENT
Start: 2018-03-09 | End: 2018-03-18

## 2018-03-09 RX ADMIN — FLUOXETINE 40 MG: 20 CAPSULE ORAL at 08:48

## 2018-03-09 RX ADMIN — METFORMIN HYDROCHLORIDE 500 MG: 500 TABLET ORAL at 08:48

## 2018-03-09 RX ADMIN — LEVOTHYROXINE SODIUM 25 MCG: 25 TABLET ORAL at 06:09

## 2018-03-09 RX ADMIN — ENOXAPARIN SODIUM 40 MG: 40 INJECTION SUBCUTANEOUS at 08:48

## 2018-03-09 RX ADMIN — OXYCODONE HYDROCHLORIDE AND ACETAMINOPHEN 1 TABLET: 10; 325 TABLET ORAL at 06:09

## 2018-03-09 RX ADMIN — FUROSEMIDE 40 MG: 40 TABLET ORAL at 08:48

## 2018-03-09 RX ADMIN — OXYCODONE HYDROCHLORIDE AND ACETAMINOPHEN 1 TABLET: 10; 325 TABLET ORAL at 02:27

## 2018-03-09 RX ADMIN — CARVEDILOL 25 MG: 25 TABLET, FILM COATED ORAL at 06:09

## 2018-03-09 RX ADMIN — IBUPROFEN 800 MG: 800 TABLET ORAL at 08:48

## 2018-03-09 NOTE — PROGRESS NOTES
"Subjective    The patient has not complaints.  Tolerating oral intake.  Pain is controlled. +ambulation.               Objective     /59  Pulse 102  Temp 98 °F (36.7 °C) (Oral)   Resp 16  Ht 162.6 cm (64\")  Wt 99.8 kg (220 lb)  LMP 03/01/2018  SpO2 98%  Breastfeeding? No  BMI 37.76 kg/m2  General:  alert, appears stated age and cooperative   Abdomen: soft, bowel sounds active, non-tender   Incision:   healing well, no drainage, no erythema, no hernia, no seroma, no swelling, no dehiscence, incision well approximated         Assessment      {doing well:54695::\"Doing well postoperatively.\"     Plan     1. Continue any current medications.  2. Wound care discussed.  "

## 2018-03-09 NOTE — PLAN OF CARE
Problem: Patient Care Overview (Adult)  Goal: Plan of Care Review  Outcome: Ongoing (interventions implemented as appropriate)   03/08/18 2206   Coping/Psychosocial Response Interventions   Plan Of Care Reviewed With patient   Patient Care Overview   Progress improving       Problem: Hysterectomy (Adult)  Goal: Signs and Symptoms of Listed Potential Problems Will be Absent or Manageable (Hysterectomy)  Outcome: Ongoing (interventions implemented as appropriate)   03/08/18 2206   Hysterectomy   Problems Assessed (Hysterectomy) all   Problems Present (Hysterectomy) none       Problem: Cholecystitis/Cholecystectomy (Adult)  Goal: Signs and Symptoms of Listed Potential Problems Will be Absent or Manageable (Cholecystitis/Cholecystectomy)  Outcome: Ongoing (interventions implemented as appropriate)   03/08/18 2206   Cholecystitis/Cholecystectomy   Problems Assessed (Cholecystitis/Cholecystectomy) all   Problems Present (Cholecystitis/Cholecystectomy) pain

## 2018-03-09 NOTE — DISCHARGE SUMMARY
Date of Discharge:  3/9/2018    Discharge Diagnosis:   Pelvic pain  Uterine hypertrophy  Abnormal uterine bleeding  Cholecystitis    Problem List:  Active Problems:    Cholecystitis      Presenting Problem/History of Present Illness  Gallstones [K80.20]  LLQ abdominal pain [R10.32]  Cholecystitis [K81.9]      Hospital Course  Patient is a 37 y.o. female presented with the above diagnosis and underwent elective surgery.  She did well.     Procedures Performed  Procedure(s):  TOTAL LAPAROSCOPIC HYSTERECTOMY BILATERAL SALPINGECTOMY AND LEFT OOPHORECTOMY WITH DAVINCI SI ROBOT  CHOLECYSTECTOMY LAPAROSCOPIC       Consults:   Consults     No orders found for last 30 day(s).          Pertinent Test Results:    Condition on Discharge: Stable  Vital Signs  Temp:  [97.4 °F (36.3 °C)-98 °F (36.7 °C)] 98 °F (36.7 °C)  Heart Rate:  [] 102  Resp:  [9-28] 16  BP: (106-154)/(56-92) 106/59    Discharge Disposition  Home or Self Care    Discharge Medications   Renate Mitchell   Home Medication Instructions DYLAN:[id number]    Printed on:03/09/18 9181   Medication Information                      amitriptyline (ELAVIL) 25 MG tablet  TAKE 1 TABLET BY MOUTH ONCE DAILY WITH SUPPER             carvedilol (COREG) 12.5 MG tablet  Take 12.5 mg by mouth Every Night.             carvedilol (COREG) 25 MG tablet  Take 25 mg by mouth Every Morning.             docusate calcium (SURFAK) 240 MG capsule  Take 1 capsule by mouth Daily.             FLUoxetine (PROzac) 20 MG capsule  Take 40 mg by mouth Daily.             furosemide (LASIX) 40 MG tablet  Take 40 mg by mouth Daily.             HYDROcodone-acetaminophen (NORCO) 5-325 MG per tablet  Take 1 tablet by mouth 3 (Three) Times a Day As Needed.             ibuprofen (ADVIL,MOTRIN) 600 MG tablet  Take 1 tablet by mouth Every 6 (Six) Hours As Needed for Mild Pain  for up to 30 days.             levothyroxine (SYNTHROID, LEVOTHROID) 25 MCG tablet  Take 25 mcg by mouth Daily.              metFORMIN (GLUCOPHAGE) 500 MG tablet  Take 500 mg by mouth 2 (Two) Times a Day With Meals.             omeprazole (PriLOSEC) 40 MG capsule  Take 40 mg by mouth Every Night.             oxyCODONE-acetaminophen (PERCOCET) 5-325 MG per tablet  Take 1 tablet by mouth Every 4 (Four) Hours As Needed for Moderate Pain  for up to 9 days.             pregabalin (LYRICA) 150 MG capsule  Take 150 mg by mouth Every Night.             SUMAtriptan (IMITREX) 50 MG tablet  50 mg 1 (One) Time As Needed for Migraine. Take as directed              topiramate (TOPAMAX) 50 MG tablet  TAKE 1 TABLET BY MOUTH TWICE A DAY             vitamin D (ERGOCALCIFEROL) 86052 UNITS capsule capsule  Take 50,000 Units by mouth Every 7 (Seven) Days. Tuesday                 Discharge Diet       Activity at Discharge      Follow-up Appointments  Future Appointments  Date Time Provider Department Center   5/1/2018 1:15 PM KELLEY Arellano CD None   6/12/2018 2:00 PM Yimi Iraheta MD, [facility] None           Time: Discharge 15 min

## 2018-03-11 ENCOUNTER — HOSPITAL ENCOUNTER (EMERGENCY)
Facility: HOSPITAL | Age: 38
Discharge: HOME | End: 2018-03-11
Attending: EMERGENCY MEDICINE | Admitting: NURSE PRACTITIONER

## 2018-03-11 VITALS
DIASTOLIC BLOOD PRESSURE: 68 MMHG | HEART RATE: 98 BPM | HEIGHT: 64 IN | TEMPERATURE: 97.6 F | RESPIRATION RATE: 18 BRPM | WEIGHT: 220 LBS | SYSTOLIC BLOOD PRESSURE: 101 MMHG | OXYGEN SATURATION: 99 % | BODY MASS INDEX: 37.56 KG/M2

## 2018-03-11 DIAGNOSIS — T78.40XA ALLERGIC REACTION, INITIAL ENCOUNTER: Primary | ICD-10-CM

## 2018-03-11 PROCEDURE — 96372 THER/PROPH/DIAG INJ SC/IM: CPT

## 2018-03-11 PROCEDURE — 99283 EMERGENCY DEPT VISIT LOW MDM: CPT

## 2018-03-11 PROCEDURE — 25010000002 METHYLPREDNISOLONE PER 80 MG: Performed by: NURSE PRACTITIONER

## 2018-03-11 PROCEDURE — 25010000002 DIPHENHYDRAMINE PER 50 MG: Performed by: NURSE PRACTITIONER

## 2018-03-11 RX ORDER — METHYLPREDNISOLONE ACETATE 80 MG/ML
120 INJECTION, SUSPENSION INTRA-ARTICULAR; INTRALESIONAL; INTRAMUSCULAR; SOFT TISSUE ONCE
Status: COMPLETED | OUTPATIENT
Start: 2018-03-11 | End: 2018-03-11

## 2018-03-11 RX ORDER — DIPHENHYDRAMINE HYDROCHLORIDE 50 MG/ML
25 INJECTION INTRAMUSCULAR; INTRAVENOUS ONCE
Status: COMPLETED | OUTPATIENT
Start: 2018-03-11 | End: 2018-03-11

## 2018-03-11 RX ADMIN — METHYLPREDNISOLONE ACETATE 120 MG: 80 INJECTION, SUSPENSION INTRA-ARTICULAR; INTRALESIONAL; INTRAMUSCULAR; SOFT TISSUE at 20:51

## 2018-03-11 RX ADMIN — DIPHENHYDRAMINE HYDROCHLORIDE 25 MG: 50 INJECTION INTRAMUSCULAR; INTRAVENOUS at 20:51

## 2018-03-12 NOTE — ED PROVIDER NOTES
Subjective     Rash   Location:  Torso  Torso rash location:  Abd LUQ, abd LLQ, abd RUQ and abd RLQ  Quality: itchiness and redness    Severity:  Moderate  Onset quality:  Gradual  Timing:  Constant  Progression:  Spreading  Chronicity:  New  Relieved by:  None tried  Worsened by:  Nothing  Ineffective treatments:  None tried  Associated symptoms: no abdominal pain, no fever, no periorbital edema, no throat swelling and no tongue swelling        Review of Systems   Constitutional: Negative.  Negative for fever.   HENT: Negative.    Respiratory: Negative.    Cardiovascular: Negative.  Negative for chest pain.   Gastrointestinal: Negative.  Negative for abdominal pain.   Endocrine: Negative.    Genitourinary: Negative.  Negative for dysuria.   Skin: Positive for rash.   Neurological: Negative.    Psychiatric/Behavioral: Negative.    All other systems reviewed and are negative.      Past Medical History:   Diagnosis Date   • Abnormal uterine bleeding (AUB)    • Arthritis    • Chest biopsy    • Depression    • Diabetes mellitus    • Disease of thyroid gland    • H/O exploratory laparotomy    • H/O tubal ligation    • Heart BX , benign    • Heart disease    • History of bone marrow biopsy    • History of chemotherapy    • History of radiation therapy    • Migraine    • Neuropathy     CAUSED BY RADIATIONA ND CHEMO PER PATIENT   • Non Hodgkin's lymphoma     very close to heart, in remission, last radiation 2/08, last chemo 12/07   • Pain     LOWER LEFT QUAD   • Pericardial effusion     s/p natty window- removed 398 cc    • Tachycardia        Allergies   Allergen Reactions   • Codeine Hives     ITCHES   • Cymbalta [Duloxetine Hcl] Other (See Comments)     UNABLE TO MOVE MUSCLES   • Neurontin [Gabapentin] Other (See Comments)     BLADDER DOESN'T WORK        Past Surgical History:   Procedure Laterality Date   • ABDOMINAL SURGERY     • BREAST SURGERY      lump removed   • CARDIAC BIOPSY     • CARDIOVASCULAR STRESS TEST   2008    A. Cardiolyte- (Dr. Jay). EF 45% at rest, 53% with stress   • CARDIOVASCULAR STRESS TEST  02/10/2009    3 min, 20 sec. 95% THR. bp- 180/98, EF 49%. Negative   • CARDIOVASCULAR STRESS TEST  2015    R. Stress- 4 min, 103% THR. 106/68, negative by EKG criteria   • CARDIOVASCULAR STRESS TEST  2017    4 min 39 sec, 97% THR, 157/76, no obvious ischemia, increase Coreg   • CATH LAB PROCEDURE  2009    normal but small coronaries, EF 45-50%   • CATH LAB PROCEDURE  2015    essentially normal coronaries. EF 50%   •  SECTION      x3   • CHOLECYSTECTOMY N/A 3/8/2018    Procedure: CHOLECYSTECTOMY LAPAROSCOPIC;  Surgeon: Artie Oneill MD;  Location:  [facility] OR;  Service:    • COLONOSCOPY N/A 2017    Procedure: COLONOSCOPY;  Surgeon: Artie Oneill MD;  Location: [facility];  Service:    • CONVERTED (HISTORICAL) HOLTER  2017    sinus ave 96 bpm, 1 PVC, 4 PACs   • DIAGNOSTIC LAPAROSCOPY     • ECHO - CONVERTED  02/10/2009    EF 50-55%   • ECHO - CONVERTED  2015    EF 40-45%, RVSP 43 mmHg mild MR   • ECHO - CONVERTED  2017    EF 50-55%, RVSP 37 mmHg, mild MR   • OTHER SURGICAL HISTORY  2015    CT abd/pelvis and chest- essentially unremarkable   • OTHER SURGICAL HISTORY  2015    HIDA scan- WNL   • PERICARDIAL WINDOW     • PORTACATH PLACEMENT     • TOTAL LAPAROSCOPIC HYSTERECTOMY N/A 3/8/2018    Procedure: TOTAL LAPAROSCOPIC HYSTERECTOMY BILATERAL SALPINGECTOMY AND LEFT OOPHORECTOMY WITH DAVINCI SI ROBOT;  Surgeon: Bill Greenwood DO;  Location:  [facility] OR;  Service:        Family History   Problem Relation Age of Onset   • Heart disease Mother    • Hypertension Mother    • Heart failure Mother    • Hypertension Father    • Hyperlipidemia Father    • Heart disease Child    • Breast cancer Maternal Aunt    • Diabetes Maternal Aunt    • Colon cancer Maternal Uncle    • Leukemia Paternal Uncle    • Hodgkin's lymphoma Paternal Uncle    • Lung cancer Paternal  Grandfather    • Leukemia Maternal Aunt        Social History     Social History   • Marital status: Single     Social History Main Topics   • Smoking status: Never Smoker   • Smokeless tobacco: Never Used   • Alcohol use No   • Drug use: No   • Sexual activity: Defer     Other Topics Concern   • Not on file           Objective   Physical Exam   Constitutional: She is oriented to person, place, and time. She appears well-developed and well-nourished. No distress.   HENT:   Head: Normocephalic and atraumatic.   Right Ear: External ear normal.   Left Ear: External ear normal.   Nose: Nose normal.   Eyes: Conjunctivae and EOM are normal. Pupils are equal, round, and reactive to light.   Neck: Normal range of motion. Neck supple. No JVD present. No tracheal deviation present.   Cardiovascular: Normal rate, regular rhythm and normal heart sounds.    No murmur heard.  Pulmonary/Chest: Effort normal and breath sounds normal. No respiratory distress. She has no wheezes.   Abdominal: Soft. Bowel sounds are normal. There is no tenderness.   Musculoskeletal: Normal range of motion. She exhibits no edema or deformity.   Neurological: She is alert and oriented to person, place, and time. No cranial nerve deficit.   Skin: Skin is warm and dry. Rash noted. She is not diaphoretic. No erythema. No pallor.   Psychiatric: She has a normal mood and affect. Her behavior is normal. Thought content normal.   Nursing note and vitals reviewed.      Procedures         ED Course  ED Course                  MDM  Number of Diagnoses or Management Options  Allergic reaction, initial encounter: minor  Risk of Complications, Morbidity, and/or Mortality  Presenting problems: low  Diagnostic procedures: minimal  Management options: minimal    Patient Progress  Patient progress: stable      Final diagnoses:   Allergic reaction, initial encounter            Chari Campbell, APRN  03/11/18 5382

## 2018-03-13 LAB
LAB AP CASE REPORT: NORMAL
LAB AP CASE REPORT: NORMAL
Lab: NORMAL
Lab: NORMAL
PATH REPORT.FINAL DX SPEC: NORMAL
PATH REPORT.FINAL DX SPEC: NORMAL

## 2018-04-02 ENCOUNTER — OFFICE VISIT (OUTPATIENT)
Dept: SURGERY | Facility: CLINIC | Age: 38
End: 2018-04-02

## 2018-04-02 VITALS — BODY MASS INDEX: 37.56 KG/M2 | WEIGHT: 220 LBS | HEIGHT: 64 IN

## 2018-04-02 DIAGNOSIS — Z90.49 STATUS POST LAPAROSCOPIC CHOLECYSTECTOMY: Primary | ICD-10-CM

## 2018-04-02 PROBLEM — K81.9 CHOLECYSTITIS: Status: RESOLVED | Noted: 2018-03-08 | Resolved: 2018-04-02

## 2018-04-02 PROCEDURE — 99024 POSTOP FOLLOW-UP VISIT: CPT | Performed by: SURGERY

## 2018-04-02 NOTE — PROGRESS NOTES
Subjective   Renate Mitchell is a 37 y.o. female.     History of Present Illness She has not had any more LLQ pain since the hysterectomy. She was concerned with some bloating or swelling in her upper abdomen. No trouble eating.     The following portions of the patient's history were reviewed and updated as appropriate: current medications, past family history, past medical history, past social history, past surgical history and problem list.    Review of Systems    Objective   Physical Exam wounds healing well. No redness or inflammation    Assessment/Plan   Renate was seen today for follow-up.    Diagnoses and all orders for this visit:    Status post laparoscopic cholecystectomy      Return prn

## 2018-05-01 ENCOUNTER — OFFICE VISIT (OUTPATIENT)
Dept: CARDIOLOGY | Facility: CLINIC | Age: 38
End: 2018-05-01

## 2018-05-01 VITALS
WEIGHT: 226 LBS | HEART RATE: 96 BPM | SYSTOLIC BLOOD PRESSURE: 118 MMHG | HEIGHT: 64 IN | BODY MASS INDEX: 38.58 KG/M2 | DIASTOLIC BLOOD PRESSURE: 72 MMHG

## 2018-05-01 DIAGNOSIS — E88.81 METABOLIC SYNDROME: ICD-10-CM

## 2018-05-01 DIAGNOSIS — I10 ESSENTIAL HYPERTENSION: Primary | ICD-10-CM

## 2018-05-01 DIAGNOSIS — I27.20 PULMONARY HYPERTENSION (HCC): ICD-10-CM

## 2018-05-01 DIAGNOSIS — R53.83 OTHER FATIGUE: ICD-10-CM

## 2018-05-01 DIAGNOSIS — Z79.899 MEDICATION MANAGEMENT: ICD-10-CM

## 2018-05-01 DIAGNOSIS — E66.09 CLASS 2 OBESITY DUE TO EXCESS CALORIES WITHOUT SERIOUS COMORBIDITY WITH BODY MASS INDEX (BMI) OF 38.0 TO 38.9 IN ADULT: ICD-10-CM

## 2018-05-01 PROCEDURE — 99213 OFFICE O/P EST LOW 20 MIN: CPT | Performed by: NURSE PRACTITIONER

## 2018-05-01 RX ORDER — TIZANIDINE 4 MG/1
2 TABLET ORAL NIGHTLY PRN
COMMUNITY
End: 2019-06-05 | Stop reason: ALTCHOICE

## 2018-05-01 RX ORDER — ACETAMINOPHEN AND CODEINE PHOSPHATE 60; 300 MG/1; MG/1
1 TABLET ORAL EVERY 4 HOURS PRN
COMMUNITY
End: 2018-08-14

## 2018-05-01 NOTE — PROGRESS NOTES
"Chief Complaint   Patient presents with   • Follow-up     For cardiac management. Chest pain and shortness of breath unchanged from before. Doesn't need labs at this time. Labs per PCP today.        Qian Mitchell is a 37 y.o. female  with a history of non-Hodgkins lymphoma for which she has undergone chemo and radiation in the past with the last in 2008. She was referred to [facility] for worsening shortness of breath. In May of 2015 she was seen for pericarditis vs worsening lymphoma. Workup showed essentially normal coronaries and mild LV dysfunction. In 2017, she hdd more edema and reoccurrence of chest pain. Stress test was negative for ischemia but showed increased heart rate and blood pressure for which the dose of Coreg was increased. Echocardiogram showed some improvement of LV ejection fraction. A Holter monitor was also ordered and showed baseline rhythm sinus with average heat rate 96 bpm. No significant arrhythmia noted. In October 2017, an overnight oxygen study done and results in normal range.   Today she comes to the office for a follow up visit and denies cardiac concerns. No significant chest pain or palpitations noted. Shortness of breath is the same. Her main concern is \"constant fatigue\" and lack of energy. She is frustrated she is not able to lose weight despite her efforts.       HPI     Cardiac History:    Past Surgical History:   Procedure Laterality Date   • ABDOMINAL SURGERY     • BREAST SURGERY      lump removed   • CARDIAC BIOPSY     • CARDIOVASCULAR STRESS TEST  01/11/2008    CESAR Cardiolyte- (Dr. Jay). EF 45% at rest, 53% with stress   • CARDIOVASCULAR STRESS TEST  02/10/2009    3 min, 20 sec. 95% THR. bp- 180/98, EF 49%. Negative   • CARDIOVASCULAR STRESS TEST  07/2015    R. Stress- 4 min, 103% THR. 106/68, negative by EKG criteria   • CARDIOVASCULAR STRESS TEST  05/18/2017    4 min 39 sec, 97% THR, 157/76, no obvious ischemia, increase Coreg   • CATH LAB PROCEDURE  " 2009    normal but small coronaries, EF 45-50%   • CATH LAB PROCEDURE  2015    essentially normal coronaries. EF 50%   •  SECTION      x3   • CHOLECYSTECTOMY N/A 3/8/2018    Procedure: CHOLECYSTECTOMY LAPAROSCOPIC;  Surgeon: Artie Oneill MD;  Location:  [facility] OR;  Service:    • COLONOSCOPY N/A 2017    Procedure: COLONOSCOPY;  Surgeon: Artie Oneill MD;  Location:  [facility] OR;  Service:    • CONVERTED (HISTORICAL) HOLTER  2017    sinus ave 96 bpm, 1 PVC, 4 PACs   • DIAGNOSTIC LAPAROSCOPY     • ECHO - CONVERTED  02/10/2009    EF 50-55%   • ECHO - CONVERTED  2015    EF 40-45%, RVSP 43 mmHg mild MR   • ECHO - CONVERTED  2017    EF 50-55%, RVSP 37 mmHg, mild MR   • OTHER SURGICAL HISTORY  2015    CT abd/pelvis and chest- essentially unremarkable   • OTHER SURGICAL HISTORY  2015    HIDA scan- WNL   • PERICARDIAL WINDOW     • PORTACATH PLACEMENT     • TOTAL LAPAROSCOPIC HYSTERECTOMY N/A 3/8/2018    Procedure: TOTAL LAPAROSCOPIC HYSTERECTOMY BILATERAL SALPINGECTOMY AND LEFT OOPHORECTOMY WITH DAVINCI SI ROBOT;  Surgeon: Bill Greenwood DO;  Location:  [facility] OR;  Service:        Current Outpatient Prescriptions   Medication Sig Dispense Refill   • acetaminophen-codeine (TYLENOL #4) 300-60 MG per tablet Take 1 tablet by mouth Every 4 (Four) Hours As Needed for Moderate Pain .     • amitriptyline (ELAVIL) 25 MG tablet TAKE 1 TABLET BY MOUTH ONCE DAILY WITH SUPPER 30 tablet 4   • carvedilol (COREG) 12.5 MG tablet Take 12.5 mg by mouth Every Night.     • carvedilol (COREG) 25 MG tablet Take 25 mg by mouth Every Morning.     • FLUoxetine (PROzac) 20 MG capsule Take 20 mg by mouth Daily.     • furosemide (LASIX) 40 MG tablet Take 40 mg by mouth Daily.     • levothyroxine (SYNTHROID, LEVOTHROID) 25 MCG tablet Take 25 mcg by mouth Daily.     • metFORMIN (GLUCOPHAGE) 500 MG tablet Take 500 mg by mouth 2 (Two) Times a Day With Meals.     • omeprazole (PriLOSEC) 40 MG capsule  Take 40 mg by mouth Every Night.     • pregabalin (LYRICA) 150 MG capsule Take 150 mg by mouth Every Night.     • SUMAtriptan (IMITREX) 50 MG tablet 50 mg 1 (One) Time As Needed for Migraine. Take as directed      • tiZANidine (ZANAFLEX) 4 MG tablet Take 4 mg by mouth. Takes 1/2 tab at night     • topiramate (TOPAMAX) 50 MG tablet TAKE 1 TABLET BY MOUTH TWICE A DAY (Patient taking differently: Take 1 tablet by mouth daily) 60 tablet 2   • vitamin D (ERGOCALCIFEROL) 08087 UNITS capsule capsule Take 50,000 Units by mouth Every 7 (Seven) Days. Tuesday       No current facility-administered medications for this visit.        Codeine; Cymbalta [duloxetine hcl]; and Neurontin [gabapentin]    Past Medical History:   Diagnosis Date   • Abnormal uterine bleeding (AUB)    • Arthritis    • Chest biopsy    • Depression    • Diabetes mellitus    • Disease of thyroid gland    • H/O exploratory laparotomy    • H/O tubal ligation    • Heart BX , benign    • Heart disease    • History of bone marrow biopsy    • History of chemotherapy    • History of radiation therapy    • Migraine    • Neuropathy     CAUSED BY RADIATIONA ND CHEMO PER PATIENT   • Non Hodgkin's lymphoma     very close to heart, in remission, last radiation 2/08, last chemo 12/07   • Pain     LOWER LEFT QUAD   • Pericardial effusion     s/p natty window- removed 398 cc    • Tachycardia        Social History     Social History   • Marital status: Single     Spouse name: N/A   • Number of children: N/A   • Years of education: N/A     Occupational History   • Not on file.     Social History Main Topics   • Smoking status: Never Smoker   • Smokeless tobacco: Never Used   • Alcohol use No   • Drug use: No   • Sexual activity: Defer     Other Topics Concern   • Not on file     Social History Narrative   • No narrative on file       Family History   Problem Relation Age of Onset   • Heart disease Mother    • Hypertension Mother    • Heart failure Mother    • Hypertension  "Father    • Hyperlipidemia Father    • Heart disease Child    • Breast cancer Maternal Aunt    • Diabetes Maternal Aunt    • Colon cancer Maternal Uncle    • Leukemia Paternal Uncle    • Hodgkin's lymphoma Paternal Uncle    • Lung cancer Paternal Grandfather    • Leukemia Maternal Aunt        Review of Systems   Constitution: Positive for malaise/fatigue. Negative for decreased appetite and weakness.   HENT: Negative for congestion, hoarse voice, nosebleeds and sore throat.    Eyes: Negative for blurred vision.   Respiratory: Positive for shortness of breath. Negative for sleep disturbances due to breathing, snoring and sputum production.    Endocrine: Negative for cold intolerance and heat intolerance.   Hematologic/Lymphatic: Negative for adenopathy. Does not bruise/bleed easily.   Skin: Negative for color change, dry skin and itching.   Musculoskeletal: Negative for joint pain, muscle cramps and myalgias.   Gastrointestinal: Negative for abdominal pain, change in bowel habit, dysphagia, heartburn, melena and nausea.   Genitourinary: Negative for dysuria and hematuria.   Neurological: Positive for excessive daytime sleepiness and headaches (if does not take Topamax). Negative for dizziness and light-headedness.   Psychiatric/Behavioral: Negative for altered mental status. The patient does not have insomnia.    Allergic/Immunologic: Negative for environmental allergies and hives.        Objective     /72   Pulse 96   Ht 162.6 cm (64\")   Wt 103 kg (226 lb)   BMI 38.79 kg/m²     Physical Exam   Constitutional: She is oriented to person, place, and time. Vital signs are normal. She appears well-nourished. She does not appear ill. No distress.   HENT:   Head: Normocephalic.   Eyes: Conjunctivae are normal. Pupils are equal, round, and reactive to light.   Neck: Normal range of motion. Neck supple. Carotid bruit is not present. No thyromegaly present.   Cardiovascular: Normal rate, regular rhythm, S1 normal " and normal pulses.    No murmur heard.  Loud S2   Pulmonary/Chest: Breath sounds normal. She has no wheezes. She has no rales.   Abdominal: Soft. Bowel sounds are normal. She exhibits no distension. There is no tenderness.   Musculoskeletal: Normal range of motion. She exhibits no edema.   Neurological: She is alert and oriented to person, place, and time.   Skin: Skin is warm and dry.   Psychiatric: She has a normal mood and affect. Her behavior is normal.        Procedures: none today        Assessment/Plan      Renate was seen today for follow-up.    Diagnoses and all orders for this visit:    Essential hypertension    Metabolic syndrome    Other fatigue    Class 2 obesity due to excess calories without serious comorbidity with body mass index (BMI) of 38.0 to 38.9 in adult    Medication management    Pulmonary hypertension        Her main complaint today is fatigue for which she has recently had appointment with you and is undergoing workup. Labs are pending according to patient.. Her resting heart rate remains high normal, 90s moslty. We discussed consider changing beta blocker from Coreg to Lopressor or Bystolic. At this time, she wants to wait for lab reports and further workup including possible sleep study.  In the past she was on CCB by another cardiologist, which according to patient did not seem to control heart rate as well as beta blocker.   Body mass index is 38.79 kg/m². Patient's Body mass index is 38.79 kg/m². BMI is above normal parameters. Follow-up plan includes:  nutrition counseling. Diabetic diet and diet for weight loss information given to her. I advised referral to dietician for nutritional management as well. She will discuss with you for referral.   Her blood pressure is normal. No further cardiac workup or medication changes made today.   We will see her back in 6 months or sooner for problems. Please forward a copy of recent lab results when available.            Electronically signed  by Cassi Teran, APRN,  May 2, 2018 8:15 AM

## 2018-05-29 RX ORDER — TOPIRAMATE 50 MG/1
TABLET, FILM COATED ORAL
Qty: 60 TABLET | Refills: 0 | Status: SHIPPED | OUTPATIENT
Start: 2018-05-29 | End: 2018-06-12 | Stop reason: ALTCHOICE

## 2018-06-12 ENCOUNTER — OFFICE VISIT (OUTPATIENT)
Dept: NEUROLOGY | Facility: CLINIC | Age: 38
End: 2018-06-12

## 2018-06-12 VITALS
DIASTOLIC BLOOD PRESSURE: 72 MMHG | HEART RATE: 74 BPM | OXYGEN SATURATION: 98 % | HEIGHT: 64 IN | SYSTOLIC BLOOD PRESSURE: 114 MMHG | WEIGHT: 226 LBS | BODY MASS INDEX: 38.58 KG/M2

## 2018-06-12 DIAGNOSIS — T50.905A WEIGHT GAIN DUE TO MEDICATION: ICD-10-CM

## 2018-06-12 DIAGNOSIS — R63.5 WEIGHT GAIN DUE TO MEDICATION: ICD-10-CM

## 2018-06-12 DIAGNOSIS — G62.9 POLYNEUROPATHY: Primary | ICD-10-CM

## 2018-06-12 DIAGNOSIS — R40.0 DROWSY: ICD-10-CM

## 2018-06-12 PROCEDURE — 99214 OFFICE O/P EST MOD 30 MIN: CPT | Performed by: PSYCHIATRY & NEUROLOGY

## 2018-06-12 RX ORDER — TOPIRAMATE 100 MG/1
100 TABLET, FILM COATED ORAL 2 TIMES DAILY
Qty: 60 TABLET | Refills: 2 | Status: SHIPPED | OUTPATIENT
Start: 2018-06-12 | End: 2018-06-12 | Stop reason: SDUPTHER

## 2018-06-12 RX ORDER — TRAMADOL HYDROCHLORIDE 50 MG/1
1 TABLET ORAL 3 TIMES DAILY PRN
COMMUNITY
Start: 2018-04-09 | End: 2019-02-26 | Stop reason: ALTCHOICE

## 2018-06-12 RX ORDER — AMITRIPTYLINE HYDROCHLORIDE 25 MG/1
25 TABLET, FILM COATED ORAL NIGHTLY
Qty: 30 TABLET | Refills: 4 | Status: SHIPPED | OUTPATIENT
Start: 2018-06-12 | End: 2018-08-14 | Stop reason: SDUPTHER

## 2018-06-12 RX ORDER — LANCETS 33 GAUGE
EACH MISCELLANEOUS
COMMUNITY
Start: 2018-04-02 | End: 2020-06-29

## 2018-06-12 RX ORDER — TOPIRAMATE 100 MG/1
100 TABLET, FILM COATED ORAL 2 TIMES DAILY
Qty: 60 TABLET | Refills: 2 | Status: SHIPPED | OUTPATIENT
Start: 2018-06-12 | End: 2018-12-31 | Stop reason: SDUPTHER

## 2018-06-12 NOTE — PROGRESS NOTES
[facility] NEUROLOGY [place] PROGRESS NOTE  History of Present Illness     Date: 6/12/2018    Patient Identification  Renate Mitchell is a 38 y.o. female.    Patient information was obtained from patient.  History/Exam limitations: none.    Original consultation requested by: Anuel BENSON      Chief Complaint   Polyneuropathy (Pt in office today for 6 month follow up)      History of Present Illness   The patient reports having no new problems concerning her polyneuropathy over the past six months.  She has tried to taper herself off of the pregabalin as recent as 2-3 weeks ago, but started taking it again after her polyneuropathy began to worsen within 2 weeks.  She also reports currently taking her pregabalin once nightly due to a desire to lose weight and topiramate once every other night due to having impaired concentration throughout the day when she was taking the twice daily regimen.  The patient underwent hysterectomy and cholecystectomy in March 2018.      PMH:   Past Medical History:   Diagnosis Date   • Abnormal uterine bleeding (AUB)    • Arthritis    • Chest biopsy    • Depression    • Diabetes mellitus    • Disease of thyroid gland    • H/O exploratory laparotomy    • H/O tubal ligation    • Heart BX , benign    • Heart disease    • History of bone marrow biopsy    • History of chemotherapy    • History of radiation therapy    • Migraine    • Neuropathy     CAUSED BY RADIATIONA ND CHEMO PER PATIENT   • Non Hodgkin's lymphoma     very close to heart, in remission, last radiation 2/08, last chemo 12/07   • Pain     LOWER LEFT QUAD   • Pericardial effusion     s/p natty window- removed 398 cc    • Tachycardia        Past Surgical History:   Past Surgical History:   Procedure Laterality Date   • ABDOMINAL SURGERY     • BREAST SURGERY      lump removed   • CARDIAC BIOPSY     • CARDIOVASCULAR STRESS TEST  01/11/2008    CESAR Cardiolyte- (Dr. Jay). EF 45% at rest, 53% with stress   • CARDIOVASCULAR  STRESS TEST  02/10/2009    3 min, 20 sec. 95% THR. bp- 180/98, EF 49%. Negative   • CARDIOVASCULAR STRESS TEST  2015    R. Stress- 4 min, 103% THR. 106/68, negative by EKG criteria   • CARDIOVASCULAR STRESS TEST  2017    4 min 39 sec, 97% THR, 157/76, no obvious ischemia, increase Coreg   • CATH LAB PROCEDURE  2009    normal but small coronaries, EF 45-50%   • CATH LAB PROCEDURE  2015    essentially normal coronaries. EF 50%   •  SECTION      x3   • CHOLECYSTECTOMY N/A 3/8/2018    Procedure: CHOLECYSTECTOMY LAPAROSCOPIC;  Surgeon: Artie Oneill MD;  Location: [facility];  Service:    • COLONOSCOPY N/A 2017    Procedure: COLONOSCOPY;  Surgeon: Artie Oneill MD;  Location: [facility];  Service:    • CONVERTED (HISTORICAL) HOLTER  2017    sinus ave 96 bpm, 1 PVC, 4 PACs   • DIAGNOSTIC LAPAROSCOPY     • ECHO - CONVERTED  02/10/2009    EF 50-55%   • ECHO - CONVERTED  2015    EF 40-45%, RVSP 43 mmHg mild MR   • ECHO - CONVERTED  2017    EF 50-55%, RVSP 37 mmHg, mild MR   • OTHER SURGICAL HISTORY  2015    CT abd/pelvis and chest- essentially unremarkable   • OTHER SURGICAL HISTORY  2015    HIDA scan- WNL   • PERICARDIAL WINDOW     • PORTACATH PLACEMENT     • TOTAL LAPAROSCOPIC HYSTERECTOMY N/A 3/8/2018    Procedure: TOTAL LAPAROSCOPIC HYSTERECTOMY BILATERAL SALPINGECTOMY AND LEFT OOPHORECTOMY WITH DAVINCI SI ROBOT;  Surgeon: Bill Greenwood DO;  Location: [facility];  Service:        Family Hisotry:   Family History   Problem Relation Age of Onset   • Heart disease Mother    • Hypertension Mother    • Heart failure Mother    • Hypertension Father    • Hyperlipidemia Father    • Heart disease Child    • Breast cancer Maternal Aunt    • Diabetes Maternal Aunt    • Colon cancer Maternal Uncle    • Leukemia Paternal Uncle    • Hodgkin's lymphoma Paternal Uncle    • Lung cancer Paternal Grandfather    • Leukemia Maternal Aunt        Social History:   Social  History     Social History   • Marital status: Single     Spouse name: N/A   • Number of children: N/A   • Years of education: N/A     Occupational History   • Not on file.     Social History Main Topics   • Smoking status: Never Smoker   • Smokeless tobacco: Never Used   • Alcohol use No   • Drug use: No   • Sexual activity: Defer     Other Topics Concern   • Not on file     Social History Narrative   • No narrative on file       Medications:   Current Outpatient Prescriptions   Medication Sig Dispense Refill   • acetaminophen-codeine (TYLENOL #4) 300-60 MG per tablet Take 1 tablet by mouth Every 4 (Four) Hours As Needed for Moderate Pain .     • amitriptyline (ELAVIL) 25 MG tablet Take 1 tablet by mouth Every Night. 30 tablet 4   • carvedilol (COREG) 12.5 MG tablet Take 12.5 mg by mouth Every Night.     • carvedilol (COREG) 25 MG tablet Take 25 mg by mouth Every Morning.     • FLUoxetine (PROzac) 20 MG capsule Take 20 mg by mouth Daily.     • furosemide (LASIX) 40 MG tablet Take 40 mg by mouth Daily.     • levothyroxine (SYNTHROID, LEVOTHROID) 25 MCG tablet Take 25 mcg by mouth Daily.     • metFORMIN (GLUCOPHAGE) 500 MG tablet Take 500 mg by mouth 2 (Two) Times a Day With Meals.     • Multiple Vitamin (ONE-A-DAY ESSENTIAL PO) Take 1 tablet by mouth Daily.     • omeprazole (PriLOSEC) 40 MG capsule Take 40 mg by mouth Every Night.     • ONE TOUCH ULTRA TEST test strip      • ONETOUCH DELICA LANCETS 33G misc      • pregabalin (LYRICA) 150 MG capsule Take 150 mg by mouth Every Night.     • SUMAtriptan (IMITREX) 50 MG tablet 50 mg 1 (One) Time As Needed for Migraine. Take as directed      • tiZANidine (ZANAFLEX) 4 MG tablet Take 4 mg by mouth. Takes 1/2 tab at night     • traMADol (ULTRAM) 50 MG tablet 1 tablet 3 (Three) Times a Day As Needed.     • vitamin D (ERGOCALCIFEROL) 43525 UNITS capsule capsule Take 50,000 Units by mouth Every 7 (Seven) Days. Tuesday     • topiramate (TOPAMAX) 100 MG tablet Take 1 tablet by  "mouth 2 (Two) Times a Day. 60 tablet 2     No current facility-administered medications for this visit.        Allergy:   Allergies   Allergen Reactions   • Codeine Allergic reaction to contrast dye     ITCHES   • Cymbalta [Duloxetine Hcl] Other (See Comments)     UNABLE TO MOVE MUSCLES   • Neurontin [Gabapentin] Other (See Comments)     BLADDER DOESN'T WORK        Review of Systems:  Review of Systems   Constitutional: Negative for chills and fever.   HENT: Negative for congestion, ear pain, hearing loss, rhinorrhea and sore throat.    Eyes: Negative for pain, discharge and redness.   Respiratory: Negative for cough, shortness of breath, wheezing and stridor.    Cardiovascular: Negative for chest pain, palpitations and leg swelling.   Gastrointestinal: Negative for abdominal pain, constipation, nausea and vomiting.   Endocrine: Negative for cold intolerance, heat intolerance and polyphagia.   Genitourinary: Negative for dysuria, flank pain, frequency and urgency.   Musculoskeletal: Positive for gait problem. Negative for joint swelling, myalgias, neck pain and neck stiffness.   Skin: Negative for pallor, rash and wound.   Allergic/Immunologic: Negative for environmental allergies.   Neurological: Positive for weakness and numbness. Negative for dizziness, tremors, seizures, syncope, facial asymmetry, speech difficulty, light-headedness and headaches.   Hematological: Negative for adenopathy.   Psychiatric/Behavioral: Negative for confusion and hallucinations. The patient is not nervous/anxious.        Physical Exam     Vitals:    06/12/18 1342   BP: 114/72   Pulse: 74   SpO2: 98%   Weight: 103 kg (226 lb)   Height: 162.6 cm (64\")     GENERAL: Patient is pleasant, cooperative, appears to be stated age.  Body habitus is endomorphic.  SKIN AND EXTREMITIES:  No skin rashes or lesions are noted.  No cyanosis, clubbing or edema of the extremities.    HEAD:  Head is normocephalic and atraumatic.    NECK: Neck are " non-tender without thyromegaly or adenopathy.  Carotic upstrokes are 1+/4.  No cranial or cervical bruits.  The neck is supple with a full range of motion.   ENT: palate elevate symmetrically, no evidence of high arch palate, tongue midline erythema in posterior pharynx, Mallampati Classification Class III   CARDIOVASCULAR:  Regular rate and rhythm with normal S1 and S2 without rub or gallop.  RESPIRATORY:  Clear to auscultation without wheezes or crackle   ABDOMEN:  Soft and non-tender, positive bowel sound without hepatosplenomegaly  BACK:  Back is straight without midline defect.    PSYCH:  Higher cortical function/mental status:  The patient is alert.  She is oriented x3 to time, place and person.  Recent and the remote memory appear normal.  The patient has a good fund of knowledge.  There is no visual or auditory hallucination or suicidal or homicidal ideation.  SPEECH:There is no gross evidence of aphasia, dysarthria or agnosia.      CRANIAL NERVES:  Pupils are 4mm, equal round reactive to light, reacting briskly to 2mm without afferent pupillary defect.  Visual fields are intact to confrontation testing.  Fundoscopic examination reveals sharp disk margins with normal vasculature.  No papilledema, hemorrhages or exudates.  Extraocular movements are full and smooth with normal pursuits and saccades.  No nystagmus noted.  The face is symmetric. palate elevate symmetrically, Tongue midline, positive gag reflex. The remainder of the cranial nerves are intact and symmetrical.    MOTOR: Strength is 5/5 throughout with normal tone and bulk with the following exceptions, 4/5 intrinsic muscles of the hands and feet.  No involuntary movements noted.    Deep Tendon Reflexes: are 2/4 and symmetrical in the upper extremities, 2/4 and symmetrical at the knees and 1/4 and symmetrical at the Achilles tendon.  Plantar responses were down-going bilaterally.    SENSATION:  Intact to pinprick, light touch, vibration and  proprioception.  Coordination:  The patient normally performs finger-nose-finger, heel-to-knee-to-shin and rapid alternating movements in symmetrical fashion.    COORDINATION AND GAIT:  The patient walks with a narrow-based gait.  Patient is able to heel-toe and tandem walk forward and backwards without difficulty.  Romberg and monopedal  Romberg are negative.    MUSCULOSKELETAL: Range of motion normal, no clubbing, cyanosis, or edema.  No joint swelling.            Records Reviewed: I have personally reviewed her previous medical record.    Renate was seen today for polyneuropathy.    Diagnoses and all orders for this visit:    Polyneuropathy    Weight gain due to medication    Drowsy    Other orders  -     Discontinue: topiramate (TOPAMAX) 100 MG tablet; Take 1 tablet by mouth 2 (Two) Times a Day.  -     amitriptyline (ELAVIL) 25 MG tablet; Take 1 tablet by mouth Every Night.  -     topiramate (TOPAMAX) 100 MG tablet; Take 1 tablet by mouth 2 (Two) Times a Day.        Treatments:  1.  Since patient is concerned about weight gain and drowsiness will begin to wean patient off from [place] but she is also concerned about the recurrence of her peripheral neuropathy pain and she is willing to increase the Topamax  Discussion:  I have counseled patient extensively on peripheral neuropathy.  The prevalence of peripheral neuropathy in a general practice is 8 percent in persons 55 years and older.    Peripheral neuropathy can be caused by a variety of systemic diseases, toxic exposures, medications, infections, and hereditary disorder. The most common treatable causes are diabetes, hypothyroidism, and nutritional deficiencies.  When a patient presents with symptoms of distal numbness, tingling and pain, or weakness, the first step is to determine whether the symptoms are the result of peripheral neuropathy or of other part of  the CNS, such as nerve roots, or nerve plexus. CNS lesions may be associated with other  features, such as speech difficulty, double vision, ataxia, cranial nerve involvement, or, in cases of myelopathy, impairment of bowel and bladder functions. Deep tendon reflexes are usually brisk, and muscle tone is spastic. Lesions of the peripheral nerve roots are typically asymmetric, follow a dermatomal pattern of sensory symptoms, and may have associated neck and low back pain. Lesions of the plexus are asymmetric with sensorimotor involvement of multiple nerves in one extremity. The presence of neuropathic symptoms, decreased ankle reflexes, and decreased distal sensations, regardless of distal muscle weakness and atrophy, makes the diagnosis of peripheral neuropathy likely.   The next step is to find the etiology and exclude potentially treatable causes, such as acquired toxic, nutritional, inflammatory, or immune-mediated demyelinating disorders. The neuropathies must be further characterized by onset and chronicity of symptoms, the pattern and extent of involvement, and the type of nerve fibers involved (i.e., sensory, motor, or autonomic).   The evaluation of a patient with peripheral neuropathy starts with simple blood tests, including a complete blood count, comprehensive metabolic profile, and measurement of erythrocyte sedimentation rate and fasting blood glucose, vitamin B12, and thyroid-stimulating hormone levels. Electrodiagnostic studies are recommended if the diagnosis remains unclear after initial diagnostic testing and a careful history and physical examination.  Treatment of peripheral neuropathy has two goals:  first, is to eliminate the offending agents, such as toxins or medications; correcting a nutritional deficiency; or treating the underlying disease (e.g., corticosteroid therapy for immune-mediated neuropathy). These steps are important to halt the progression of neuropathy, and they may improve symptoms. Second, is to help patients control troublesome symptoms of peripheral  neuropathy, such as severe numbness and pain, as well as to alleviate disability resulting from weakness. Several pharmacologic options exist to treat neuropathic pain, including gabapentin [Neurontin], topiramate [Topamax], carbamazepine [Tegretol], pregabalin [Lyrica]) and antidepressants (e.g., amitriptyline). Topical patches and sprays containing lidocaine (Lidoderm) or capsaicin (Zostrix) also may relieve pain in some patients.Other supportive measures, such as foot care, weight reduction, and shoe selection, may also be helpful.  This Document is signed by Yimi Iraheta MD, FAAN, FAASM   June 12, 201810:38 PM

## 2018-06-25 RX ORDER — AMITRIPTYLINE HYDROCHLORIDE 25 MG/1
TABLET, FILM COATED ORAL
Qty: 30 TABLET | Refills: 0 | OUTPATIENT
Start: 2018-06-25

## 2018-08-14 ENCOUNTER — OFFICE VISIT (OUTPATIENT)
Dept: NEUROLOGY | Facility: CLINIC | Age: 38
End: 2018-08-14

## 2018-08-14 VITALS
SYSTOLIC BLOOD PRESSURE: 118 MMHG | WEIGHT: 226 LBS | BODY MASS INDEX: 38.58 KG/M2 | HEART RATE: 93 BPM | HEIGHT: 64 IN | DIASTOLIC BLOOD PRESSURE: 66 MMHG | OXYGEN SATURATION: 100 %

## 2018-08-14 DIAGNOSIS — G62.9 POLYNEUROPATHY: ICD-10-CM

## 2018-08-14 DIAGNOSIS — G47.33 OBSTRUCTIVE SLEEP APNEA: Primary | ICD-10-CM

## 2018-08-14 DIAGNOSIS — G47.19 EXCESSIVE DAYTIME SLEEPINESS: ICD-10-CM

## 2018-08-14 DIAGNOSIS — G47.34 NOCTURNAL OXYGEN DESATURATION: ICD-10-CM

## 2018-08-14 PROCEDURE — 99214 OFFICE O/P EST MOD 30 MIN: CPT | Performed by: PSYCHIATRY & NEUROLOGY

## 2018-08-14 RX ORDER — AMITRIPTYLINE HYDROCHLORIDE 25 MG/1
50 TABLET, FILM COATED ORAL NIGHTLY
Qty: 60 TABLET | Refills: 4 | Status: SHIPPED | OUTPATIENT
Start: 2018-08-14 | End: 2018-12-31 | Stop reason: SDUPTHER

## 2018-08-14 NOTE — PROGRESS NOTES
[facility] NEUROLOGY [place] PROGRESS NOTE  History of Present Illness     Date: 8/14/2018    Patient Identification  Renate Mitchell is a 38 y.o. female.    Patient information was obtained from patient.  History/Exam limitations: none.    Original consultation requested by: KELLEY Anaya    Chief Complaint   Polyneuropathy (Pt in office today for 2 month follow up )      History of Present Illness   Patient is a pleasant 38-year-old referred to [facility] [place] for evaluation of polyneuropathy.  Patient reported that despite taking Lyrica, and Topamax she is still experiencing burning dysesthesia.      With counseled patient extensively the importance of periodic blood sugar monitoring and tight blood sugar control.  Was also at amitriptyline 25 mg 2 pill at suppertime to the regimen.    In today visit patient is also complaining of snoring witnessed apneic episode and excessive daytime sleepiness.    PMH:   Past Medical History:   Diagnosis Date   • Abnormal uterine bleeding (AUB)    • Arthritis    • Chest biopsy    • Depression    • Diabetes mellitus (CMS/HCC)    • Difficulty walking 2008    Due to neuropathy, sometimes I can’t put no weight on my fee   • Disease of thyroid gland    • H/O exploratory laparotomy    • H/O tubal ligation    • Heart BX , benign    • Heart disease    • History of bone marrow biopsy    • History of chemotherapy    • History of radiation therapy    • Migraine    • Neuropathy     CAUSED BY RADIATIONA ND CHEMO PER PATIENT   • Non Hodgkin's lymphoma (CMS/HCC)     very close to heart, in remission, last radiation 2/08, last chemo 12/07   • Pain     LOWER LEFT QUAD   • Pericardial effusion     s/p natty window- removed 398 cc    • Peripheral neuropathy 2008   • Tachycardia        Past Surgical History:   Past Surgical History:   Procedure Laterality Date   • ABDOMINAL SURGERY     • BREAST SURGERY      lump removed   • CARDIAC BIOPSY     • CARDIOVASCULAR STRESS  TEST  2008    A. Cardiolyte- (Dr. Jay). EF 45% at rest, 53% with stress   • CARDIOVASCULAR STRESS TEST  02/10/2009    3 min, 20 sec. 95% THR. bp- 180/98, EF 49%. Negative   • CARDIOVASCULAR STRESS TEST  2015    R. Stress- 4 min, 103% THR. 106/68, negative by EKG criteria   • CARDIOVASCULAR STRESS TEST  2017    4 min 39 sec, 97% THR, 157/76, no obvious ischemia, increase Coreg   • CATH LAB PROCEDURE  2009    normal but small coronaries, EF 45-50%   • CATH LAB PROCEDURE  2015    essentially normal coronaries. EF 50%   •  SECTION      x3   • CHOLECYSTECTOMY N/A 3/8/2018    Procedure: CHOLECYSTECTOMY LAPAROSCOPIC;  Surgeon: Artie Oneill MD;  Location: [facility] OR;  Service:    • COLONOSCOPY N/A 2017    Procedure: COLONOSCOPY;  Surgeon: Artie Oneill MD;  Location: [facility] OR;  Service:    • CONVERTED (HISTORICAL) HOLTER  2017    sinus ave 96 bpm, 1 PVC, 4 PACs   • DIAGNOSTIC LAPAROSCOPY     • ECHO - CONVERTED  02/10/2009    EF 50-55%   • ECHO - CONVERTED  2015    EF 40-45%, RVSP 43 mmHg mild MR   • ECHO - CONVERTED  2017    EF 50-55%, RVSP 37 mmHg, mild MR   • OTHER SURGICAL HISTORY  2015    CT abd/pelvis and chest- essentially unremarkable   • OTHER SURGICAL HISTORY  2015    HIDA scan- WNL   • PERICARDIAL WINDOW     • PORTACATH PLACEMENT     • TOTAL LAPAROSCOPIC HYSTERECTOMY N/A 3/8/2018    Procedure: TOTAL LAPAROSCOPIC HYSTERECTOMY BILATERAL SALPINGECTOMY AND LEFT OOPHORECTOMY WITH DAVINCI SI ROBOT;  Surgeon: Bill Greenwood DO;  Location: [facility] OR;  Service:        Family Hisotry:   Family History   Problem Relation Age of Onset   • Heart disease Mother    • Hypertension Mother    • Heart failure Mother    • Hypertension Father    • Hyperlipidemia Father    • Heart disease Child    • Breast cancer Maternal Aunt    • Diabetes Maternal Aunt    • Colon cancer Maternal Uncle    • Leukemia Paternal Uncle    • Hodgkin's lymphoma Paternal Uncle     • Lung cancer Paternal Grandfather    • Leukemia Maternal Aunt        Social History:   Social History     Social History   • Marital status: Single     Spouse name: N/A   • Number of children: N/A   • Years of education: N/A     Occupational History   • Not on file.     Social History Main Topics   • Smoking status: Never Smoker   • Smokeless tobacco: Never Used   • Alcohol use No   • Drug use: No   • Sexual activity: Yes     Partners: Male     Birth control/ protection: Surgical     Other Topics Concern   • Not on file     Social History Narrative   • No narrative on file       Medications:   Current Outpatient Prescriptions   Medication Sig Dispense Refill   • amitriptyline (ELAVIL) 25 MG tablet Take 2 tablets by mouth Every Night. 60 tablet 4   • carvedilol (COREG) 12.5 MG tablet Take 12.5 mg by mouth Every Night.     • carvedilol (COREG) 25 MG tablet Take 25 mg by mouth Every Morning.     • FLUoxetine (PROzac) 20 MG capsule Take 20 mg by mouth Daily.     • furosemide (LASIX) 40 MG tablet Take 40 mg by mouth Daily.     • levothyroxine (SYNTHROID, LEVOTHROID) 25 MCG tablet Take 25 mcg by mouth Daily.     • metFORMIN (GLUCOPHAGE) 500 MG tablet Take 500 mg by mouth 2 (Two) Times a Day With Meals.     • Multiple Vitamin (ONE-A-DAY ESSENTIAL PO) Take 1 tablet by mouth Daily.     • omeprazole (PriLOSEC) 40 MG capsule Take 40 mg by mouth Every Night.     • ONE TOUCH ULTRA TEST test strip      • ONETOUCH DELICA LANCETS 33G misc      • pregabalin (LYRICA) 150 MG capsule Take 150 mg by mouth Every Night.     • SUMAtriptan (IMITREX) 50 MG tablet 50 mg 1 (One) Time As Needed for Migraine. Take as directed      • tiZANidine (ZANAFLEX) 4 MG tablet Take 4 mg by mouth. Takes 1/2 tab at night     • topiramate (TOPAMAX) 100 MG tablet Take 1 tablet by mouth 2 (Two) Times a Day. 60 tablet 2   • traMADol (ULTRAM) 50 MG tablet 1 tablet 3 (Three) Times a Day As Needed.     • vitamin D (ERGOCALCIFEROL) 85707 UNITS capsule capsule  "Take 50,000 Units by mouth Every 7 (Seven) Days. Tuesday       No current facility-administered medications for this visit.        Allergy:   Allergies   Allergen Reactions   • Codeine Hives     ITCHES   • Cymbalta [Duloxetine Hcl] Other (See Comments)     UNABLE TO MOVE MUSCLES   • Neurontin [Gabapentin] Other (See Comments)     BLADDER DOESN'T WORK        Review of Systems:  Review of Systems   Constitutional: Negative for chills and fever.   HENT: Negative for congestion, ear pain, hearing loss, rhinorrhea and sore throat.    Eyes: Negative for pain, discharge and redness.   Respiratory: Negative for cough, shortness of breath, wheezing and stridor.    Cardiovascular: Negative for chest pain, palpitations and leg swelling.   Gastrointestinal: Negative for abdominal pain, constipation, nausea and vomiting.   Endocrine: Negative for cold intolerance, heat intolerance and polyphagia.   Genitourinary: Negative for dysuria, flank pain, frequency and urgency.   Musculoskeletal: Positive for gait problem. Negative for joint swelling, myalgias, neck pain and neck stiffness.   Skin: Negative for pallor, rash and wound.   Allergic/Immunologic: Negative for environmental allergies.   Neurological: Positive for weakness and numbness. Negative for dizziness, tremors, seizures, syncope, facial asymmetry, speech difficulty, light-headedness and headaches.   Hematological: Negative for adenopathy.   Psychiatric/Behavioral: Negative for confusion and hallucinations. The patient is not nervous/anxious.        Physical Exam     Vitals:    08/14/18 1545   BP: 118/66   Pulse: 93   SpO2: 100%   Weight: 103 kg (226 lb)   Height: 162.6 cm (64\")     GENERAL: Patient is pleasant, cooperative, appears to be stated age.  Body habitus is endomorphic.  SKIN AND EXTREMITIES:  No skin rashes or lesions are noted.  No cyanosis, clubbing or edema of the extremities.    HEAD:  Head is normocephalic and atraumatic.    NECK: Neck are non-tender " without thyromegaly or adenopathy.  Carotic upstrokes are 1+/4.  No cranial or cervical bruits.  The neck is supple with a full range of motion.   ENT: palate elevate symmetrically, no evidence of high arch palate, tongue midline erythema in posterior pharynx, Mallampati Classification Class III   CARDIOVASCULAR:  Regular rate and rhythm with normal S1 and S2 without rub or gallop.  RESPIRATORY:  Clear to auscultation without wheezes or crackle   ABDOMEN:  Soft and non-tender, positive bowel sound without hepatosplenomegaly  BACK:  Back is straight without midline defect.    PSYCH:  Higher cortical function/mental status:  The patient is alert.  She is oriented x3 to time, place and person.  Recent and the remote memory appear normal.  The patient has a good fund of knowledge.  There is no visual or auditory hallucination or suicidal or homicidal ideation.  SPEECH:There is no gross evidence of aphasia, dysarthria or agnosia.      CRANIAL NERVES:  Pupils are 4mm, equal round reactive to light, reacting briskly to 2mm without afferent pupillary defect.  Visual fields are intact to confrontation testing.  Fundoscopic examination reveals sharp disk margins with normal vasculature.  No papilledema, hemorrhages or exudates.  Extraocular movements are full and smooth with normal pursuits and saccades.  No nystagmus noted.  The face is symmetric. palate elevate symmetrically, Tongue midline, positive gag reflex. The remainder of the cranial nerves are intact and symmetrical.    MOTOR: Strength is 4/5 throughout with normal tone and bulk with the following exceptions, 4/5 intrinsic muscles of the hands and feet.  No involuntary movements noted.    Deep Tendon Reflexes: are 2/4 and symmetrical in the upper extremities, 0/4 and symmetrical at the knees and 0/4 and symmetrical at the Achilles tendon.  Plantar responses were down-going bilaterally.    SENSATION: Stocking glove sensory deficit  COORDINATION AND GAIT:  Patient  ambulates with a cautious gait  MUSCULOSKELETAL: Range of motion normal, no clubbing, cyanosis, or edema.  No joint swelling.            Records Reviewed: I have personally reviewed her previous medical record.    Renate was seen today for polyneuropathy.    Diagnoses and all orders for this visit:    Obstructive sleep apnea  -     Home Sleep Study; Future    Excessive daytime sleepiness  -     Home Sleep Study; Future    Nocturnal oxygen desaturation  -     Home Sleep Study; Future    Polyneuropathy    Other orders  -     amitriptyline (ELAVIL) 25 MG tablet; Take 2 tablets by mouth Every Night.        Treatments:  1.  Will add Elavil 25 mg 1 pill suppertime to the regimen for peripheral neuropathy  2.  Encourage regular sleep wake schedule  3.  Avoid sleep deprivation  4.  Counseled patient on sleep apnea  Discussion:  I have counseled patient extensively on Sleep Hygiene including regular sleep wake schedule and stimulus control therapy.  I have also discussed the importance of weight reduction because 10% reduction in body weight can reduce sleep apnea by 50 %. We have also discussed abstaining from smoking and drinking.  I have explained to patient that obstructive apnea episode is defined as the absence of airflow for at least 10 seconds.  Sleep apnea is usually accompanied by snoring, disturbed sleep, and daytime sleepiness. Patients with micrognathia, retrognathia, enlarged tonsils, tongue enlargement, and acromegaly are especially predisposed to obstructive sleep apnea. Abnormalities or weakness in the muscles can also contribute to obstructive sleep apnea. Obesity can also contribute to sleep apnea.     Sleep apnea can lead to a number of complications, ranging from daytime sleepiness to possible increased risk of cardiovascular risks.   Daytime sleepiness is the most serious.  Daytime sleepiness can also increase the risk for accident-related injuries. Several studies have suggested that people with sleep  apnea have two to three times as many car accidents, and five to seven times the risk for multiple accidents.   A number of cardiovascular diseases -- including high blood pressure, heart failure, stroke, and heart arrhythmias -- have an association with obstructive sleep apnea.   Up to a third of patients with heart failure also have sleep apnea. Both central and obstructive sleep apnea are linked with heart failure. Obstructive sleep apnea is also noted to be associated with type 2 diabetes according to Dr. Jewell at [facility].  The best treatment for symptomatic obstructive sleep apnea is continuous positive airflow pressure (CPAP). Bilevel positive airway pressure (BPAP) systems may be particularly helpful for patients with coexisting lung disease and those with excessive levels of carbon dioxide.  Other treatment options including UPPP surgery, LAUP surgery, radiofrequency somnoplasty and dental appliances such Archie or Clearway.  This Document is signed by Yimi Iraheta MD, FAAN, FAASM   August 14, 201811:50 PM

## 2018-08-28 ENCOUNTER — OFFICE VISIT (OUTPATIENT)
Dept: CARDIOLOGY | Facility: CLINIC | Age: 38
End: 2018-08-28

## 2018-08-28 ENCOUNTER — TELEPHONE (OUTPATIENT)
Dept: CARDIOLOGY | Facility: CLINIC | Age: 38
End: 2018-08-28

## 2018-08-28 ENCOUNTER — CLINICAL SUPPORT (OUTPATIENT)
Dept: CARDIOLOGY | Facility: CLINIC | Age: 38
End: 2018-08-28

## 2018-08-28 VITALS
BODY MASS INDEX: 38.76 KG/M2 | SYSTOLIC BLOOD PRESSURE: 140 MMHG | HEIGHT: 64 IN | DIASTOLIC BLOOD PRESSURE: 86 MMHG | HEART RATE: 88 BPM | WEIGHT: 227 LBS

## 2018-08-28 DIAGNOSIS — E11.9 TYPE 2 DIABETES MELLITUS WITHOUT COMPLICATION, WITHOUT LONG-TERM CURRENT USE OF INSULIN (HCC): ICD-10-CM

## 2018-08-28 DIAGNOSIS — R00.2 PALPITATIONS: ICD-10-CM

## 2018-08-28 DIAGNOSIS — R60.0 LOCALIZED EDEMA: ICD-10-CM

## 2018-08-28 DIAGNOSIS — R00.2 PALPITATIONS: Primary | ICD-10-CM

## 2018-08-28 DIAGNOSIS — E66.9 OBESITY (BMI 30-39.9): ICD-10-CM

## 2018-08-28 DIAGNOSIS — I10 ESSENTIAL HYPERTENSION: ICD-10-CM

## 2018-08-28 DIAGNOSIS — R07.89 OTHER CHEST PAIN: ICD-10-CM

## 2018-08-28 DIAGNOSIS — E03.9 ACQUIRED HYPOTHYROIDISM: ICD-10-CM

## 2018-08-28 PROCEDURE — 99214 OFFICE O/P EST MOD 30 MIN: CPT | Performed by: NURSE PRACTITIONER

## 2018-08-28 PROCEDURE — 0296T PR EXT ECG > 48HR TO 21 DAY RCRD W/CONECT INTL RCRD: CPT | Performed by: INTERNAL MEDICINE

## 2018-08-28 RX ORDER — CARVEDILOL 25 MG/1
25 TABLET ORAL 2 TIMES DAILY
Qty: 60 TABLET | Refills: 11 | Status: SHIPPED | OUTPATIENT
Start: 2018-08-28 | End: 2019-02-26 | Stop reason: SDUPTHER

## 2018-08-28 NOTE — PROGRESS NOTES
Chief Complaint   Patient presents with   • Follow-up     For cardiac management. Patient is not on aspirin. Reports that she has been having sharp chest pains, reports that when they happen her head will go numb. Reports that she has shortness of breath all the time. Reports that she stays swollen. Reports that heart has been racing.    • Med Refill     Did not bring medication list.        Cardiac Complaints  chest pressure/discomfort and palpitations      Qian Mitchell is a 38 y.o. female with a history of palpitations, chest pain, shortness of breath, and non-Hodgkin's lymphoma for which she has undergone chemo and radiation in the past with the last in 2008. She was referred to [facility] for worsening shortness of breath. In May of 2015 she was seen for pericarditis vs worsening lymphoma. Workup showed essentially normal coronaries and mild LV dysfunction. In 2017, she had more edema and reoccurrence of chest pain. Stress test was negative for ischemia but showed increased heart rate and blood pressure for which the dose of Coreg was increased. Echocardiogram showed some improvement of LV ejection fraction. A Holter monitor was also ordered and showed baseline sinus with average heat rate 96 bpm. No significant arrhythmia noted. In October 2017, an overnight oxygen study was done and results were in normal range. She returns today for follow up and reports issues with sharp pains/palpitations she has been having for the last little bit. She states when she has these sharp pains it often makes her head go numb.  Patient admits to tachycardia/heart racing but states this has been present for some time.  She does also have some shortness of breath which she states is always present and does not seem to be worse than before.  Edema continues to be of concern but she states lasix does help and also reports not watching her diet closely.  No med list is brought today to visit but she was able to verify  coreg.  Labs from PCP in May show H/H 12.1/37.8, platelet 331, TSH 2.340, AIC 5.6%, Ca 9.3, Na 140, K 4.9, and creatinine 0.78.          Cardiac History  Past Surgical History:   Procedure Laterality Date   • ABDOMINAL SURGERY     • BREAST SURGERY      lump removed   • CARDIAC BIOPSY     • CARDIOVASCULAR STRESS TEST  2008    CESAR Cardiolyte- (Dr. Jay). EF 45% at rest, 53% with stress   • CARDIOVASCULAR STRESS TEST  02/10/2009    3 min, 20 sec. 95% THR. bp- 180/98, EF 49%. Negative   • CARDIOVASCULAR STRESS TEST  2015    R. Stress- 4 min, 103% THR. 106/68, negative by EKG criteria   • CARDIOVASCULAR STRESS TEST  2017    4 min 39 sec, 97% THR, 157/76, no obvious ischemia, increase Coreg   • CATH LAB PROCEDURE  2009    normal but small coronaries, EF 45-50%   • CATH LAB PROCEDURE  2015    essentially normal coronaries. EF 50%   •  SECTION      x3   • CHOLECYSTECTOMY N/A 3/8/2018    Procedure: CHOLECYSTECTOMY LAPAROSCOPIC;  Surgeon: Artie Oneill MD;  Location: [facility];  Service:    • COLONOSCOPY N/A 2017    Procedure: COLONOSCOPY;  Surgeon: Artie Oneill MD;  Location: [facility];  Service:    • CONVERTED (HISTORICAL) HOLTER  2017    sinus ave 96 bpm, 1 PVC, 4 PACs   • DIAGNOSTIC LAPAROSCOPY     • ECHO - CONVERTED  02/10/2009    EF 50-55%   • ECHO - CONVERTED  2015    EF 40-45%, RVSP 43 mmHg mild MR   • ECHO - CONVERTED  2017    EF 50-55%, RVSP 37 mmHg, mild MR   • OTHER SURGICAL HISTORY  2015    CT abd/pelvis and chest- essentially unremarkable   • OTHER SURGICAL HISTORY  2015    HIDA scan- WNL   • PERICARDIAL WINDOW     • PORTACATH PLACEMENT     • TOTAL LAPAROSCOPIC HYSTERECTOMY N/A 3/8/2018    Procedure: TOTAL LAPAROSCOPIC HYSTERECTOMY BILATERAL SALPINGECTOMY AND LEFT OOPHORECTOMY WITH DAVINCI SI ROBOT;  Surgeon: Bill Greenwood DO;  Location: [facility];  Service:        Current Outpatient Prescriptions   Medication Sig Dispense Refill   •  amitriptyline (ELAVIL) 25 MG tablet Take 2 tablets by mouth Every Night. 60 tablet 4   • carvedilol (COREG) 25 MG tablet Take 1 tablet by mouth 2 (Two) Times a Day. 60 tablet 11   • FLUoxetine (PROzac) 20 MG capsule Take 20 mg by mouth Daily.     • furosemide (LASIX) 40 MG tablet Take 40 mg by mouth Daily.     • levothyroxine (SYNTHROID, LEVOTHROID) 25 MCG tablet Take 25 mcg by mouth Daily.     • metFORMIN (GLUCOPHAGE) 500 MG tablet Take 500 mg by mouth 2 (Two) Times a Day With Meals.     • Multiple Vitamin (ONE-A-DAY ESSENTIAL PO) Take 1 tablet by mouth Daily.     • omeprazole (PriLOSEC) 40 MG capsule Take 40 mg by mouth Every Night.     • ONE TOUCH ULTRA TEST test strip      • ONETOUCH DELICA LANCETS 33G misc      • pregabalin (LYRICA) 150 MG capsule Take 150 mg by mouth Every Night.     • SUMAtriptan (IMITREX) 50 MG tablet 50 mg 1 (One) Time As Needed for Migraine. Take as directed      • tiZANidine (ZANAFLEX) 4 MG tablet Take 4 mg by mouth. Takes 1/2 tab at night     • topiramate (TOPAMAX) 100 MG tablet Take 1 tablet by mouth 2 (Two) Times a Day. 60 tablet 2   • traMADol (ULTRAM) 50 MG tablet 1 tablet 3 (Three) Times a Day As Needed.     • vitamin D (ERGOCALCIFEROL) 90158 UNITS capsule capsule Take 50,000 Units by mouth Every 7 (Seven) Days. Tuesday       No current facility-administered medications for this visit.        Codeine; Cymbalta [duloxetine hcl]; and Neurontin [gabapentin]    Past Medical History:   Diagnosis Date   • Abnormal uterine bleeding (AUB)    • Arthritis    • Chest biopsy    • Depression    • Diabetes mellitus (CMS/HCC)    • Difficulty walking 2008    Due to neuropathy, sometimes I can’t put no weight on my fee   • Disease of thyroid gland    • H/O exploratory laparotomy    • H/O tubal ligation    • Heart BX , benign    • Heart disease    • History of bone marrow biopsy    • History of chemotherapy    • History of radiation therapy    • HTN (hypertension)    • Migraine    • Neuropathy      CAUSED BY RADIATIONA ND CHEMO PER PATIENT   • Non Hodgkin's lymphoma (CMS/HCC)     very close to heart, in remission, last radiation 2/08, last chemo 12/07   • Pain     LOWER LEFT QUAD   • Pericardial effusion     s/p natty window- removed 398 cc    • Peripheral neuropathy 2008   • Tachycardia        Social History     Social History   • Marital status: Single     Spouse name: N/A   • Number of children: N/A   • Years of education: N/A     Occupational History   • Not on file.     Social History Main Topics   • Smoking status: Never Smoker   • Smokeless tobacco: Never Used   • Alcohol use No   • Drug use: No   • Sexual activity: Yes     Partners: Male     Birth control/ protection: Surgical     Other Topics Concern   • Not on file     Social History Narrative   • No narrative on file       Family History   Problem Relation Age of Onset   • Heart disease Mother    • Hypertension Mother    • Heart failure Mother    • Hypertension Father    • Hyperlipidemia Father    • Heart disease Child    • Breast cancer Maternal Aunt    • Diabetes Maternal Aunt    • Colon cancer Maternal Uncle    • Leukemia Paternal Uncle    • Hodgkin's lymphoma Paternal Uncle    • Lung cancer Paternal Grandfather    • Leukemia Maternal Aunt        Review of Systems   Constitution: Negative for weakness and malaise/fatigue.   Cardiovascular: Positive for chest pain, irregular heartbeat and palpitations. Negative for dyspnea on exertion, leg swelling, near-syncope and syncope.   Respiratory: Negative for cough, shortness of breath and wheezing.    Musculoskeletal: Negative for back pain, joint pain and joint swelling.   Gastrointestinal: Negative for anorexia, flatus, nausea and vomiting.   Genitourinary: Negative for dysuria, hematuria, hesitancy, nocturia and urgency.   Neurological: Negative for dizziness, light-headedness and loss of balance.   Psychiatric/Behavioral: Negative for depression and memory loss. The patient is not nervous/anxious.   "          Objective     /86 (BP Location: Left arm)   Pulse 88   Ht 162.6 cm (64.02\")   Wt 103 kg (227 lb)   BMI 38.94 kg/m²     Physical Exam   Constitutional: She appears well-developed and well-nourished.   HENT:   Head: Normocephalic and atraumatic.   Eyes: Pupils are equal, round, and reactive to light. EOM are normal.   Neck: Normal range of motion.   Cardiovascular: Normal rate and regular rhythm.    Murmur heard.  Pulmonary/Chest: Effort normal and breath sounds normal.   Abdominal: Soft.   Musculoskeletal: Normal range of motion. She exhibits edema.   Neurological: She is alert.   Skin: Skin is warm and dry.   Psychiatric: She has a normal mood and affect. Her behavior is normal.       Procedures    Assessment/Plan     HR is high side of normal today at 88 and more palpitations are reported.  Patient will be advised to increase coreg therapy to 25mg BID.  Repeat holter will be advised to assess for any tachy arrhythmias which may be causing concerns.  Limited caffeine intake recommended.  Most recent labs show no significant abnormalities which would attribute to current symptoms. Since patient does describe sharp chest pain as well as edema and palpitations, repeat echo will be ordered to assess valvular areas, for any WMA, and any LV dysfunction.  More recommendations to follow. Lasix therapy will be continued for edema management.   Blood pressure is slightly elevated at 140/86, increase of coreg should help with blood pressure control.   Limited sodium intake advised.  She will continue to follow with your office in regards to DM and hypothyroidism management.  BMI elevated today at 38.94 good cardiac ADA diet with limited caloric intake, saturated fat, and starches advised.  6 month follow up advised or sooner if needed.    Problems Addressed this Visit        Cardiovascular and Mediastinum    HTN (hypertension)    Relevant Medications    carvedilol (COREG) 25 MG tablet      Other Visit " Diagnoses     Palpitations    -  Primary    Relevant Orders    Holter Monitor - 72 Hour Up To 21 Days    Other chest pain        Relevant Orders    Holter Monitor - 72 Hour Up To 21 Days    Adult Transthoracic Echo Complete W/ Cont if Necessary Per Protocol    Localized edema        Relevant Orders    Adult Transthoracic Echo Complete W/ Cont if Necessary Per Protocol    Type 2 diabetes mellitus without complication, without long-term current use of insulin (CMS/[facility])        Acquired hypothyroidism        Relevant Medications    carvedilol (COREG) 25 MG tablet    Obesity (BMI 30-39.9)              Patient's Body mass index is 38.94 kg/m². BMI is above normal parameters. Recommendations include: nutrition counseling.            Electronically signed by KELLEY Blanco August 28, 2018 3:02 PM

## 2018-09-04 ENCOUNTER — OUTSIDE FACILITY SERVICE (OUTPATIENT)
Dept: CARDIOLOGY | Facility: CLINIC | Age: 38
End: 2018-09-04

## 2018-09-04 PROCEDURE — 0298T PR EXT ECG > 48HR TO 21 DAY REVIEW AND INTERPRETATN: CPT | Performed by: INTERNAL MEDICINE

## 2018-09-05 ENCOUNTER — HOSPITAL ENCOUNTER (OUTPATIENT)
Dept: CARDIOLOGY | Facility: HOSPITAL | Age: 38
Discharge: HOME | End: 2018-09-05
Admitting: NURSE PRACTITIONER

## 2018-09-05 ENCOUNTER — TELEPHONE (OUTPATIENT)
Dept: CARDIOLOGY | Facility: CLINIC | Age: 38
End: 2018-09-05

## 2018-09-05 DIAGNOSIS — R07.89 OTHER CHEST PAIN: ICD-10-CM

## 2018-09-05 DIAGNOSIS — R60.0 LOCALIZED EDEMA: ICD-10-CM

## 2018-09-05 LAB
MAXIMAL PREDICTED HEART RATE: 180 BPM
STRESS TARGET HR: 153 BPM

## 2018-09-05 PROCEDURE — 93306 TTE W/DOPPLER COMPLETE: CPT | Performed by: INTERNAL MEDICINE

## 2018-09-05 PROCEDURE — 93306 TTE W/DOPPLER COMPLETE: CPT

## 2018-09-06 RX ORDER — DILTIAZEM HYDROCHLORIDE 60 MG/1
TABLET, FILM COATED ORAL
Qty: 30 TABLET | Refills: 3 | Status: SHIPPED | OUTPATIENT
Start: 2018-09-06 | End: 2020-06-29

## 2018-09-06 NOTE — TELEPHONE ENCOUNTER
Script sent for cardizem 60 mg as needed for HR equal to or greater than 100. Patient aware of instructions.

## 2018-09-06 NOTE — TELEPHONE ENCOUNTER
If I said 60mg to begin with I think that will be fine.  If it does not control then 90mg.  Only as needed.

## 2018-09-10 ENCOUNTER — HOSPITAL ENCOUNTER (OUTPATIENT)
Dept: SLEEP MEDICINE | Facility: HOSPITAL | Age: 38
Discharge: HOME | End: 2018-09-10
Attending: PSYCHIATRY & NEUROLOGY | Admitting: PSYCHIATRY & NEUROLOGY

## 2018-09-10 DIAGNOSIS — G47.19 EXCESSIVE DAYTIME SLEEPINESS: ICD-10-CM

## 2018-09-10 DIAGNOSIS — G47.34 NOCTURNAL OXYGEN DESATURATION: ICD-10-CM

## 2018-09-10 DIAGNOSIS — G47.33 OBSTRUCTIVE SLEEP APNEA: ICD-10-CM

## 2018-09-10 PROCEDURE — 95806 SLEEP STUDY UNATT&RESP EFFT: CPT

## 2018-09-10 PROCEDURE — 95806 SLEEP STUDY UNATT&RESP EFFT: CPT | Performed by: PSYCHIATRY & NEUROLOGY

## 2018-10-30 ENCOUNTER — OFFICE VISIT (OUTPATIENT)
Dept: NEUROLOGY | Facility: CLINIC | Age: 38
End: 2018-10-30

## 2018-10-30 VITALS
HEART RATE: 90 BPM | HEIGHT: 64 IN | OXYGEN SATURATION: 98 % | SYSTOLIC BLOOD PRESSURE: 126 MMHG | DIASTOLIC BLOOD PRESSURE: 86 MMHG

## 2018-10-30 DIAGNOSIS — G47.33 OBSTRUCTIVE SLEEP APNEA: Primary | ICD-10-CM

## 2018-10-30 PROCEDURE — 99213 OFFICE O/P EST LOW 20 MIN: CPT | Performed by: PSYCHIATRY & NEUROLOGY

## 2018-10-30 RX ORDER — DICLOFENAC SODIUM 75 MG/1
1 TABLET, DELAYED RELEASE ORAL 2 TIMES DAILY
COMMUNITY
Start: 2018-10-29 | End: 2019-02-26 | Stop reason: ALTCHOICE

## 2018-10-31 NOTE — PROGRESS NOTES
[facility] NEUROLOGY [place] PROGRESS NOTE  History of Present Illness     Date: 10/30/2018    Patient Identification  Renate Mitchell is a 38 y.o. female.    Patient information was obtained from patient.  History/Exam limitations: none.    Original consultation requested by: KELLEY Anaya      Chief Complaint   Sleep Apnea (Pt in office today to review results of HST )      History of Present Illness   Patient is a pleasant 38-year-old referred to [facility] neurology [place] for evaluation of obstructive sleep apnea for history since last visit patient underwent home sleep study.  Home sleep study did not show clinical significant amount of obstructive sleep apnea with desaturation.  Total number of obstructive sleep apnea during the sleep study was for hypotony is 18 and apnea probably index is 2.6 event per hour.      Counseled patient extensively the importance of weight reduction, good sleep hygiene, and patient expressed understanding.  PMH:   Past Medical History:   Diagnosis Date   • Abnormal uterine bleeding (AUB)    • Arthritis    • Chest biopsy    • Depression    • Diabetes mellitus (CMS/HCC)    • Difficulty walking 2008    Due to neuropathy, sometimes I can’t put no weight on my fee   • Disease of thyroid gland    • H/O exploratory laparotomy    • H/O tubal ligation    • Heart BX , benign    • Heart disease    • History of bone marrow biopsy    • History of chemotherapy    • History of radiation therapy    • HTN (hypertension)    • Migraine    • Neuropathy     CAUSED BY RADIATIONA ND CHEMO PER PATIENT   • Non Hodgkin's lymphoma (CMS/HCC)     very close to heart, in remission, last radiation 2/08, last chemo 12/07   • Pain     LOWER LEFT QUAD   • Pericardial effusion     s/p natty window- removed 398 cc    • Peripheral neuropathy 2008   • Tachycardia        Past Surgical History:   Past Surgical History:   Procedure Laterality Date   • ABDOMINAL SURGERY     • BREAST SURGERY      lump  removed   • CARDIAC BIOPSY     • CARDIOVASCULAR STRESS TEST  2008    CESAR Cardiolyte- (Dr. Jay). EF 45% at rest, 53% with stress   • CARDIOVASCULAR STRESS TEST  02/10/2009    3 min, 20 sec. 95% THR. bp- 180/98, EF 49%. Negative   • CARDIOVASCULAR STRESS TEST  2015    R. Stress- 4 min, 103% THR. 106/68, negative by EKG criteria   • CARDIOVASCULAR STRESS TEST  2017    4 min 39 sec, 97% THR, 157/76, no obvious ischemia, increase Coreg   • CATH LAB PROCEDURE  2009    normal but small coronaries, EF 45-50%   • CATH LAB PROCEDURE  2015    essentially normal coronaries. EF 50%   •  SECTION      x3   • CHOLECYSTECTOMY N/A 3/8/2018    Procedure: CHOLECYSTECTOMY LAPAROSCOPIC;  Surgeon: Artie Oneill MD;  Location: [facility] OR;  Service:    • COLONOSCOPY N/A 2017    Procedure: COLONOSCOPY;  Surgeon: Artie Oneill MD;  Location: [facility] OR;  Service:    • CONVERTED (HISTORICAL) HOLTER  2017    sinus ave 96 bpm, 1 PVC, 4 PACs   • DIAGNOSTIC LAPAROSCOPY     • ECHO - CONVERTED  02/10/2009    EF 50-55%   • ECHO - CONVERTED  2015    EF 40-45%, RVSP 43 mmHg mild MR   • ECHO - CONVERTED  2017    EF 50-55%, RVSP 37 mmHg, mild MR   • ECHO - CONVERTED  2018    TLS. EF 55%. RVSP- 33 mmHg.   • OTHER SURGICAL HISTORY  2015    CT abd/pelvis and chest- essentially unremarkable   • OTHER SURGICAL HISTORY  2015    HIDA scan- WNL   • PERICARDIAL WINDOW     • PORTACATH PLACEMENT     • TOTAL LAPAROSCOPIC HYSTERECTOMY N/A 3/8/2018    Procedure: TOTAL LAPAROSCOPIC HYSTERECTOMY BILATERAL SALPINGECTOMY AND LEFT OOPHORECTOMY WITH DAVINCI SI ROBOT;  Surgeon: Bill Greenwood DO;  Location: [facility] OR;  Service:        Family Hisotry:   Family History   Problem Relation Age of Onset   • Heart disease Mother    • Hypertension Mother    • Heart failure Mother    • Hypertension Father    • Hyperlipidemia Father    • Heart disease Child    • Breast cancer Maternal Aunt    • Diabetes  Maternal Aunt    • Colon cancer Maternal Uncle    • Leukemia Paternal Uncle    • Hodgkin's lymphoma Paternal Uncle    • Lung cancer Paternal Grandfather    • Leukemia Maternal Aunt        Social History:   Social History     Social History   • Marital status: Single     Spouse name: N/A   • Number of children: N/A   • Years of education: N/A     Occupational History   • Not on file.     Social History Main Topics   • Smoking status: Never Smoker   • Smokeless tobacco: Never Used   • Alcohol use No   • Drug use: No   • Sexual activity: Yes     Partners: Male     Birth control/ protection: Surgical     Other Topics Concern   • Not on file     Social History Narrative   • No narrative on file       Medications:   Current Outpatient Prescriptions   Medication Sig Dispense Refill   • amitriptyline (ELAVIL) 25 MG tablet Take 2 tablets by mouth Every Night. 60 tablet 4   • carvedilol (COREG) 25 MG tablet Take 1 tablet by mouth 2 (Two) Times a Day. 60 tablet 11   • diclofenac (VOLTAREN) 75 MG EC tablet 1 tablet 2 (Two) Times a Day.     • diltiaZEM (CARDIZEM) 60 MG tablet Take 1 tablet by mouth as needed for heart rate equal to or greater than 100. 30 tablet 3   • FLUoxetine (PROzac) 20 MG capsule Take 20 mg by mouth Daily.     • furosemide (LASIX) 40 MG tablet Take 40 mg by mouth Daily.     • levothyroxine (SYNTHROID, LEVOTHROID) 25 MCG tablet Take 25 mcg by mouth Daily.     • Multiple Vitamin (ONE-A-DAY ESSENTIAL PO) Take 1 tablet by mouth Daily.     • omeprazole (PriLOSEC) 40 MG capsule Take 40 mg by mouth Every Night.     • ONE TOUCH ULTRA TEST test strip      • ONETOUCH DELICA LANCETS 33G misc      • pregabalin (LYRICA) 150 MG capsule Take 150 mg by mouth Every Night.     • SUMAtriptan (IMITREX) 50 MG tablet 50 mg 1 (One) Time As Needed for Migraine. Take as directed      • tiZANidine (ZANAFLEX) 4 MG tablet Take 4 mg by mouth. Takes 1/2 tab at night     • topiramate (TOPAMAX) 100 MG tablet Take 1 tablet by mouth 2 (Two)  "Times a Day. 60 tablet 2   • traMADol (ULTRAM) 50 MG tablet 1 tablet 3 (Three) Times a Day As Needed.     • vitamin D (ERGOCALCIFEROL) 30769 UNITS capsule capsule Take 50,000 Units by mouth Every 7 (Seven) Days. Tuesday       No current facility-administered medications for this visit.        Allergy:   Allergies   Allergen Reactions   • Codeine Hives     ITCHES   • Cymbalta [Duloxetine Hcl] Other (See Comments)     UNABLE TO MOVE MUSCLES   • Neurontin [Gabapentin] Other (See Comments)     BLADDER DOESN'T WORK    • Tramadol Hcl Rash   • Tyloxapol Rash       Review of Systems:  Review of Systems   Constitutional: Positive for fatigue. Negative for chills and fever.   HENT: Negative for congestion, ear pain, hearing loss, rhinorrhea and sore throat.    Eyes: Negative for pain, discharge and redness.   Respiratory: Positive for apnea. Negative for cough, shortness of breath, wheezing and stridor.    Cardiovascular: Negative for chest pain, palpitations and leg swelling.   Gastrointestinal: Negative for abdominal pain, constipation, nausea and vomiting.   Endocrine: Negative for cold intolerance, heat intolerance and polyphagia.   Genitourinary: Negative for dysuria, flank pain, frequency and urgency.   Musculoskeletal: Negative for joint swelling, myalgias, neck pain and neck stiffness.   Skin: Negative for pallor, rash and wound.   Allergic/Immunologic: Negative for environmental allergies.   Neurological: Negative for dizziness, tremors, seizures, syncope, facial asymmetry, speech difficulty, weakness, light-headedness, numbness and headaches.   Hematological: Negative for adenopathy.   Psychiatric/Behavioral: Positive for sleep disturbance. Negative for confusion and hallucinations. The patient is not nervous/anxious.        Physical Exam     Vitals:    10/30/18 1528   BP: 126/86   Pulse: 90   SpO2: 98%   Height: 162.6 cm (64\")     GENERAL: Patient is pleasant, cooperative, appears to be stated age.  Body habitus is " endomorphic.  SKIN AND EXTREMITIES:  No skin rashes or lesions are noted.  No cyanosis, clubbing or edema of the extremities.    HEAD:  Head is normocephalic and atraumatic.    NECK: Neck are non-tender without thyromegaly or adenopathy.  Carotic upstrokes are 1+/4.  No cranial or cervical bruits.  The neck is supple with a full range of motion.   ENT: palate elevate symmetrically, no evidence of high arch palate, tongue midline erythema in posterior pharynx, Mallampati Classification Class III   CARDIOVASCULAR:  Regular rate and rhythm with normal S1 and S2 without rub or gallop.  RESPIRATORY:  Clear to auscultation without wheezes or crackle   ABDOMEN:  Soft and non-tender, positive bowel sound without hepatosplenomegaly  BACK:  Back is straight without midline defect.    PSYCH:  Higher cortical function/mental status:  The patient is alert.  She is oriented x3 to time, place and person.  Recent and the remote memory appear normal.  The patient has a good fund of knowledge.  There is no visual or auditory hallucination or suicidal or homicidal ideation.  SPEECH:There is no gross evidence of aphasia, dysarthria or agnosia.      CRANIAL NERVES:  Pupils are 4mm, equal round reactive to light, reacting briskly to 2mm without afferent pupillary defect.  Visual fields are intact to confrontation testing.  Fundoscopic examination reveals sharp disk margins with normal vasculature.  No papilledema, hemorrhages or exudates.  Extraocular movements are full and smooth with normal pursuits and saccades.  No nystagmus noted.  The face is symmetric. palate elevate symmetrically, Tongue midline, positive gag reflex. The remainder of the cranial nerves are intact and symmetrical.    MOTOR: Strength is 5/5 throughout with normal tone and bulk with the following exceptions, 4/5 intrinsic muscles of the hands and feet.  No involuntary movements noted.    Deep Tendon Reflexes: are 2/4 and symmetrical in the upper extremities, 2/4 and  symmetrical at the knees and 1/4 and symmetrical at the Achilles tendon.  Plantar responses were down-going bilaterally.    SENSATION:  Intact to pinprick, light touch, vibration and proprioception.  Coordination:  The patient normally performs finger-nose-finger, heel-to-knee-to-shin and rapid alternating movements in symmetrical fashion.    COORDINATION AND GAIT:  The patient walks with a narrow-based gait.  Patient is able to heel-toe and tandem walk forward and backwards without difficulty.  Romberg and monopedal  Romberg are negative.    MUSCULOSKELETAL: Range of motion normal, no clubbing, cyanosis, or edema.  No joint swelling.              Records Reviewed: I have personally reviewed her previous medical record.    Renate was seen today for sleep apnea.    Diagnoses and all orders for this visit:    Obstructive sleep apnea        Treatments:  Counseled patient extensively the importance of weight reduction, good sleep hygiene, and patient expressed understanding.    This Document is signed by Yimi Iraheta MD, FAAN, FAASM   October 30, 201810:05 PM

## 2018-11-27 ENCOUNTER — OFFICE VISIT (OUTPATIENT)
Dept: SURGERY | Facility: CLINIC | Age: 38
End: 2018-11-27

## 2018-11-27 VITALS
HEIGHT: 64 IN | DIASTOLIC BLOOD PRESSURE: 80 MMHG | WEIGHT: 207 LBS | BODY MASS INDEX: 35.34 KG/M2 | HEART RATE: 85 BPM | SYSTOLIC BLOOD PRESSURE: 130 MMHG | OXYGEN SATURATION: 99 %

## 2018-11-27 DIAGNOSIS — R59.9 ADENOPATHY: Primary | ICD-10-CM

## 2018-11-27 PROBLEM — Z90.49 STATUS POST LAPAROSCOPIC CHOLECYSTECTOMY: Status: RESOLVED | Noted: 2017-11-21 | Resolved: 2018-11-27

## 2018-11-27 PROCEDURE — 99213 OFFICE O/P EST LOW 20 MIN: CPT | Performed by: SURGERY

## 2018-11-27 RX ORDER — HYDROCODONE BITARTRATE AND ACETAMINOPHEN 7.5; 325 MG/1; MG/1
1 TABLET ORAL 3 TIMES DAILY
COMMUNITY
Start: 2018-11-20

## 2018-11-27 RX ORDER — CHLORAL HYDRATE 500 MG
CAPSULE ORAL
COMMUNITY
End: 2019-02-26 | Stop reason: ALTCHOICE

## 2018-11-27 NOTE — PROGRESS NOTES
Qian Mitchell is a 38 y.o. female.     History of Present Illness She had noticed lumps on both sides of her jaw a month ago. The right side came up first and then the left one. She has seen a Oncologist in [place] and a MRI was done that showed a possible lymph node on each side near the angle of the mandible. She does not have the disk today.     The following portions of the patient's history were reviewed and updated as appropriate: current medications, past family history, past medical history, past social history, past surgical history and problem list.    Review of Systems   Constitutional: Negative for activity change, appetite change, chills, fever and unexpected weight change.   HENT: Positive for facial swelling. Negative for congestion and sore throat.    Eyes: Negative for photophobia and visual disturbance.   Respiratory: Negative for chest tightness, shortness of breath and wheezing.    Cardiovascular: Negative for chest pain, palpitations and leg swelling.   Gastrointestinal: Negative for abdominal distention, abdominal pain, anal bleeding, blood in stool, constipation, diarrhea, nausea, rectal pain and vomiting.   Endocrine: Negative for cold intolerance, heat intolerance, polydipsia and polyuria.   Genitourinary: Negative for difficulty urinating, dysuria, flank pain and urgency.   Musculoskeletal: Negative for back pain and myalgias.   Skin: Negative for rash and wound.   Allergic/Immunologic: Negative for immunocompromised state.   Neurological: Negative for dizziness, seizures, syncope, light-headedness, numbness and headaches.   Hematological: Negative for adenopathy. Does not bruise/bleed easily.   Psychiatric/Behavioral: Negative for behavioral problems and confusion. The patient is not nervous/anxious.        Objective   Physical Exam   Constitutional: She is oriented to person, place, and time. She appears well-developed and well-nourished. She does not appear ill. No  distress.       HENT:   Head: Normocephalic. Head is without laceration. Hair is normal.   Right Ear: Hearing and ear canal normal.   Left Ear: Hearing and ear canal normal.   Nose: Nose normal. No sinus tenderness. No epistaxis. Right sinus exhibits no maxillary sinus tenderness and no frontal sinus tenderness. Left sinus exhibits no maxillary sinus tenderness and no frontal sinus tenderness.   Eyes: Conjunctivae and lids are normal. Pupils are equal, round, and reactive to light.   Neck: Normal range of motion. No JVD present. No tracheal tenderness present. No tracheal deviation present. No thyroid mass and no thyromegaly present.   Cardiovascular: Normal rate and regular rhythm. Exam reveals no gallop.   No murmur heard.  Pulmonary/Chest: Effort normal and breath sounds normal. No stridor. She has no wheezes. She exhibits no tenderness.   Abdominal: Soft. Bowel sounds are normal. She exhibits no distension, no ascites and no mass. There is no tenderness. There is no rebound and no guarding. No hernia.   Musculoskeletal: She exhibits no edema or deformity.   Lymphadenopathy:     She has no cervical adenopathy.     She has no axillary adenopathy.        Right: No inguinal and no supraclavicular adenopathy present.        Left: No inguinal and no supraclavicular adenopathy present.   Neurological: She is alert and oriented to person, place, and time. She exhibits normal muscle tone.   Skin: Skin is warm, dry and intact. No rash noted. No erythema. No pallor.   Psychiatric: She has a normal mood and affect. Her behavior is normal. Thought content normal.   Vitals reviewed.      Assessment/Plan   Renate was seen today for needle biopsy.    Diagnoses and all orders for this visit:    Adenopathy    She will get the MRI disk and have Xray do a FNA of the node/nodule.

## 2018-11-30 DIAGNOSIS — R59.9 ADENOPATHY: Primary | ICD-10-CM

## 2018-12-04 DIAGNOSIS — R59.0 CERVICAL ADENOPATHY: Primary | ICD-10-CM

## 2018-12-13 ENCOUNTER — HOSPITAL ENCOUNTER (OUTPATIENT)
Dept: CT IMAGING | Facility: HOSPITAL | Age: 38
Discharge: HOME | End: 2018-12-13
Attending: SURGERY | Admitting: SURGERY

## 2018-12-13 DIAGNOSIS — R59.0 CERVICAL ADENOPATHY: ICD-10-CM

## 2018-12-13 LAB — CREAT BLDA-MCNC: 0.8 MG/DL (ref 0.6–1.3)

## 2018-12-13 PROCEDURE — 70492 CT SFT TSUE NCK W/O & W/DYE: CPT | Performed by: RADIOLOGY

## 2018-12-13 PROCEDURE — 25010000002 IOPAMIDOL 61 % SOLUTION: Performed by: SURGERY

## 2018-12-13 PROCEDURE — 82565 ASSAY OF CREATININE: CPT

## 2018-12-13 PROCEDURE — 70492 CT SFT TSUE NCK W/O & W/DYE: CPT

## 2018-12-13 RX ADMIN — IOPAMIDOL 80 ML: 612 INJECTION, SOLUTION INTRAVENOUS at 10:02

## 2018-12-18 ENCOUNTER — OFFICE VISIT (OUTPATIENT)
Dept: SURGERY | Facility: CLINIC | Age: 38
End: 2018-12-18

## 2018-12-18 VITALS — HEIGHT: 64 IN | WEIGHT: 207 LBS | BODY MASS INDEX: 35.34 KG/M2

## 2018-12-18 DIAGNOSIS — C81.91: Primary | ICD-10-CM

## 2018-12-18 PROCEDURE — 99212 OFFICE O/P EST SF 10 MIN: CPT | Performed by: SURGERY

## 2018-12-18 NOTE — PROGRESS NOTES
Subjective   Renateanant Mitchell is a 38 y.o. female.     History of Present Illness She has not noticed any change in the swelling along the jaw area on either side. She had a CT here that did not see any specifically enlarged nodes, masses or fluid collections so no needle aspirate was done. She is going to see her oncologist in [place] as she has not seen him recently, and the results will be sent there.     The following portions of the patient's history were reviewed and updated as appropriate: current medications, past family history, past medical history, past social history, past surgical history and problem list.    Review of Systems jaw swelling, history of lymphoma    Objective   Physical Exam mild prominence along the edge of the mandible on both sides. No redness or tenderness.     Assessment/Plan   Renate was seen today for follow-up.    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 I do not have any specific spot to biopsy at this time. She may need a PET scan, but that would be up to oncology.

## 2019-01-02 RX ORDER — TOPIRAMATE 100 MG/1
100 TABLET, FILM COATED ORAL 2 TIMES DAILY
Qty: 60 TABLET | Refills: 4 | Status: SHIPPED | OUTPATIENT
Start: 2019-01-02 | End: 2019-02-26 | Stop reason: ALTCHOICE

## 2019-01-02 RX ORDER — AMITRIPTYLINE HYDROCHLORIDE 25 MG/1
50 TABLET, FILM COATED ORAL NIGHTLY
Qty: 60 TABLET | Refills: 4 | Status: SHIPPED | OUTPATIENT
Start: 2019-01-02 | End: 2020-06-29

## 2019-02-26 ENCOUNTER — OFFICE VISIT (OUTPATIENT)
Dept: CARDIOLOGY | Facility: CLINIC | Age: 39
End: 2019-02-26

## 2019-02-26 VITALS
SYSTOLIC BLOOD PRESSURE: 114 MMHG | HEART RATE: 76 BPM | HEIGHT: 64 IN | WEIGHT: 220 LBS | BODY MASS INDEX: 37.56 KG/M2 | DIASTOLIC BLOOD PRESSURE: 70 MMHG

## 2019-02-26 DIAGNOSIS — R60.9 EDEMA, UNSPECIFIED TYPE: ICD-10-CM

## 2019-02-26 DIAGNOSIS — I27.20 PULMONARY HTN (HCC): ICD-10-CM

## 2019-02-26 DIAGNOSIS — I10 ESSENTIAL HYPERTENSION: Primary | ICD-10-CM

## 2019-02-26 DIAGNOSIS — R00.2 PALPITATION: ICD-10-CM

## 2019-02-26 DIAGNOSIS — R00.0 TACHYCARDIA: ICD-10-CM

## 2019-02-26 DIAGNOSIS — I51.9 LV DYSFUNCTION: ICD-10-CM

## 2019-02-26 PROCEDURE — 99213 OFFICE O/P EST LOW 20 MIN: CPT | Performed by: NURSE PRACTITIONER

## 2019-02-26 RX ORDER — FUROSEMIDE 40 MG/1
40 TABLET ORAL DAILY
Qty: 30 TABLET | Refills: 11 | Status: SHIPPED | OUTPATIENT
Start: 2019-02-26 | End: 2021-02-23 | Stop reason: SDUPTHER

## 2019-02-26 RX ORDER — CARVEDILOL 25 MG/1
25 TABLET ORAL 2 TIMES DAILY
Qty: 60 TABLET | Refills: 11 | Status: SHIPPED | OUTPATIENT
Start: 2019-02-26 | End: 2021-02-23 | Stop reason: SDUPTHER

## 2019-05-07 ENCOUNTER — OFFICE VISIT (OUTPATIENT)
Dept: SURGERY | Facility: CLINIC | Age: 39
End: 2019-05-07

## 2019-05-07 VITALS — HEIGHT: 64 IN | BODY MASS INDEX: 37.56 KG/M2 | WEIGHT: 220 LBS

## 2019-05-07 DIAGNOSIS — D17.20 LIPOMA OF EXTREMITY: ICD-10-CM

## 2019-05-07 DIAGNOSIS — M25.552 LEFT HIP PAIN: Primary | ICD-10-CM

## 2019-05-07 PROBLEM — E66.812 CLASS 2 SEVERE OBESITY DUE TO EXCESS CALORIES WITH SERIOUS COMORBIDITY AND BODY MASS INDEX (BMI) OF 37.0 TO 37.9 IN ADULT: Status: ACTIVE | Noted: 2019-05-07

## 2019-05-07 PROBLEM — E66.01 CLASS 2 SEVERE OBESITY DUE TO EXCESS CALORIES WITH SERIOUS COMORBIDITY AND BODY MASS INDEX (BMI) OF 37.0 TO 37.9 IN ADULT: Status: ACTIVE | Noted: 2019-05-07

## 2019-05-07 PROCEDURE — 99213 OFFICE O/P EST LOW 20 MIN: CPT | Performed by: SURGERY

## 2019-05-07 RX ORDER — LEVOTHYROXINE, LIOTHYRONINE 19; 4.5 UG/1; UG/1
TABLET ORAL
COMMUNITY
Start: 2019-04-20 | End: 2020-06-29

## 2019-05-07 RX ORDER — PROGESTERONE 50 MG/ML
INJECTION, SOLUTION INTRAMUSCULAR
COMMUNITY
Start: 2019-02-14 | End: 2020-06-29

## 2019-05-07 NOTE — PROGRESS NOTES
Qian Mitchell is a 38 y.o. female.     History of Present Illness She has had a lump in her lateral left thigh for a few weeks. It has been sore. It may be growing. It does seem to be affected mostly by activity and certain positions.     The following portions of the patient's history were reviewed and updated as appropriate: current medications, past family history, past medical history, past social history, past surgical history and problem list.    Review of Systems   Constitutional: Negative for activity change, appetite change, chills, fever and unexpected weight change.   HENT: Negative for congestion, facial swelling and sore throat.    Eyes: Negative for photophobia and visual disturbance.   Respiratory: Negative for chest tightness, shortness of breath and wheezing.    Cardiovascular: Negative for chest pain, palpitations and leg swelling.   Gastrointestinal: Negative for abdominal distention, abdominal pain, anal bleeding, blood in stool, constipation, diarrhea, nausea, rectal pain and vomiting.   Endocrine: Negative for cold intolerance, heat intolerance, polydipsia and polyuria.   Genitourinary: Negative for difficulty urinating, dysuria, flank pain and urgency.   Musculoskeletal: Positive for arthralgias and gait problem. Negative for back pain and myalgias.   Skin: Negative for rash and wound.   Allergic/Immunologic: Negative for immunocompromised state.   Neurological: Negative for dizziness, seizures, syncope, light-headedness, numbness and headaches.   Hematological: Negative for adenopathy. Does not bruise/bleed easily.   Psychiatric/Behavioral: Negative for behavioral problems and confusion. The patient is not nervous/anxious.        Objective   Physical Exam   Constitutional: She is oriented to person, place, and time. She appears well-developed and well-nourished. She does not appear ill. No distress.       HENT:   Head: Normocephalic. Head is without laceration. Hair is  normal.   Right Ear: Hearing and ear canal normal.   Left Ear: Hearing and ear canal normal.   Nose: Nose normal. No sinus tenderness. No epistaxis. Right sinus exhibits no maxillary sinus tenderness and no frontal sinus tenderness. Left sinus exhibits no maxillary sinus tenderness and no frontal sinus tenderness.   Eyes: Conjunctivae and lids are normal. Pupils are equal, round, and reactive to light.   Neck: Normal range of motion. No JVD present. No tracheal tenderness present. No tracheal deviation present. No thyroid mass and no thyromegaly present.   Cardiovascular: Normal rate and regular rhythm. Exam reveals no gallop.   No murmur heard.  Pulmonary/Chest: Effort normal and breath sounds normal. No stridor. She has no wheezes. She exhibits no tenderness.   Abdominal: Soft. Bowel sounds are normal. She exhibits no distension, no ascites and no mass. There is no tenderness. There is no rebound and no guarding. No hernia.   Musculoskeletal: She exhibits no edema or deformity.   Lymphadenopathy:     She has no cervical adenopathy.     She has no axillary adenopathy.        Right: No inguinal and no supraclavicular adenopathy present.        Left: No inguinal and no supraclavicular adenopathy present.   Neurological: She is alert and oriented to person, place, and time. She exhibits normal muscle tone.   Skin: Skin is warm, dry and intact. No rash noted. No erythema. No pallor.   Psychiatric: She has a normal mood and affect. Her behavior is normal. Thought content normal.   Vitals reviewed.      Assessment/Plan   Renate was seen today for lipoma.    Diagnoses and all orders for this visit:    Left hip pain    Lipoma of extremity      I doubt her symptoms are from the lipoma. It is more likely she is having hip joint pain. She will be evaluated by orthopedics.     Patient's Body mass index is 37.76 kg/m². BMI is above normal parameters. Recommendations include: nutrition counseling.

## 2019-06-04 DIAGNOSIS — M25.552 LEFT HIP PAIN: Primary | ICD-10-CM

## 2019-06-05 ENCOUNTER — HOSPITAL ENCOUNTER (OUTPATIENT)
Dept: GENERAL RADIOLOGY | Facility: HOSPITAL | Age: 39
Discharge: HOME | End: 2019-06-05
Admitting: ORTHOPAEDIC SURGERY

## 2019-06-05 ENCOUNTER — OFFICE VISIT (OUTPATIENT)
Dept: ORTHOPEDIC SURGERY | Facility: CLINIC | Age: 39
End: 2019-06-05

## 2019-06-05 VITALS
WEIGHT: 211 LBS | BODY MASS INDEX: 36.02 KG/M2 | DIASTOLIC BLOOD PRESSURE: 80 MMHG | HEIGHT: 64 IN | HEART RATE: 90 BPM | SYSTOLIC BLOOD PRESSURE: 125 MMHG

## 2019-06-05 DIAGNOSIS — D17.20 LIPOMA OF EXTREMITY: ICD-10-CM

## 2019-06-05 DIAGNOSIS — M79.89 MASS OF SOFT TISSUE OF LOWER EXTREMITY: Primary | ICD-10-CM

## 2019-06-05 DIAGNOSIS — M25.552 LEFT HIP PAIN: ICD-10-CM

## 2019-06-05 PROCEDURE — 73502 X-RAY EXAM HIP UNI 2-3 VIEWS: CPT

## 2019-06-05 PROCEDURE — 73502 X-RAY EXAM HIP UNI 2-3 VIEWS: CPT | Performed by: RADIOLOGY

## 2019-06-05 PROCEDURE — 99203 OFFICE O/P NEW LOW 30 MIN: CPT | Performed by: ORTHOPAEDIC SURGERY

## 2019-06-05 RX ORDER — OMEPRAZOLE 20 MG/1
20 CAPSULE, DELAYED RELEASE ORAL DAILY
COMMUNITY
Start: 2019-05-08

## 2019-06-05 RX ORDER — PROMETHAZINE HYDROCHLORIDE 25 MG/1
25 TABLET ORAL AS NEEDED
COMMUNITY
Start: 2019-05-08

## 2019-06-05 NOTE — PROGRESS NOTES
New Patient Visit      Patient: Renate Mitchell  YOB: 1980  Date of Encounter: 06/05/2019        Chief Complaint:   Chief Complaint   Patient presents with   • Left Hip - Pain, Edema       HPI:   Renate Mitchell, 38 y.o. female, referred by Dr. Oneill presents today for evaluation of pain lateral aspect of her left hip with questionable lipoma.  Reports the lateral aspect of her left hip is getting larger the past 2 months she has seen a difference.  She has pain lateral aspect of her left hip and is unable to lie on her left side.  She has had no injury.  She has no contralateral hip pain or swelling.  She has no weakness or numbness in her left leg.  Past medical history is remarkable for Hodgkin's disease.    Active Problem List:  Patient Active Problem List   Diagnosis   • HTN (hypertension)   • Pulmonary HTN (CMS/HCC)   • Metabolic syndrome   • Hodgkin's disease (CMS/HCC)   • LLQ abdominal pain   • Adenopathy   • LV dysfunction   • Tachycardia   • Palpitation   • Lipoma of extremity   • Left hip pain   • Class 2 severe obesity due to excess calories with serious comorbidity and body mass index (BMI) of 37.0 to 37.9 in adult (CMS/HCC)       Past Medical History:  Past Medical History:   Diagnosis Date   • Abnormal uterine bleeding (AUB)    • Arthritis    • Chest biopsy    • Depression    • Diabetes mellitus (CMS/HCC)    • Difficulty walking 2008    Due to neuropathy, sometimes I can’t put no weight on my fee   • Disease of thyroid gland    • H/O exploratory laparotomy    • H/O tubal ligation    • Heart BX , benign    • Heart disease    • History of bone marrow biopsy    • History of chemotherapy    • History of radiation therapy    • HTN (hypertension)    • Migraine    • Neuropathy     CAUSED BY RADIATIONA ND CHEMO PER PATIENT   • Non Hodgkin's lymphoma (CMS/HCC)     very close to heart, in remission, last radiation 2/08, last chemo 12/07   • Pain     LOWER LEFT QUAD   • Pericardial  effusion     s/p natty window- removed 398 cc    • Peripheral neuropathy 2008   • Tachycardia        Past Surgical History:  Past Surgical History:   Procedure Laterality Date   • CARDIAC BIOPSY     • CARDIAC CATHETERIZATION  05/07/2009    normal but small coronaries, EF 45-50%   • CARDIAC CATHETERIZATION  08/04/2015    essentially normal coronaries. EF 50%   • CARDIOVASCULAR STRESS TEST  01/11/2008    CESAR Cardiolyte- (Dr. Jay). EF 45% at rest, 53% with stress   • CARDIOVASCULAR STRESS TEST  02/10/2009    3 min, 20 sec. 95% THR. bp- 180/98, EF 49%. Negative   • CARDIOVASCULAR STRESS TEST  07/2015    R. Stress- 4 min, 103% THR. 106/68, negative by EKG criteria   • CARDIOVASCULAR STRESS TEST  05/18/2017    4 min 39 sec, 97% THR, 157/76, no obvious ischemia, increase Coreg   • CHOLECYSTECTOMY N/A 3/8/2018    Procedure: CHOLECYSTECTOMY LAPAROSCOPIC;  Surgeon: Artie Oneill MD;  Location: [facility];  Service:    • COLONOSCOPY N/A 12/22/2017    Procedure: COLONOSCOPY;  Surgeon: Artie Oneill MD;  Location: [facility];  Service:    • CONVERTED (HISTORICAL) HOLTER  05/23/2017    sinus ave 96 bpm, 1 PVC, 4 PACs   • CONVERTED (HISTORICAL) HOLTER  08/28/2018    Avg 96, one 6 beat SVT   • ECHO - CONVERTED  02/10/2009    EF 50-55%   • ECHO - CONVERTED  07/20/2015    EF 40-45%, RVSP 43 mmHg mild MR   • ECHO - CONVERTED  05/18/2017    EF 50-55%, RVSP 37 mmHg, mild MR   • ECHO - CONVERTED  09/05/2018    TLS. EF 55%. RVSP- 33 mmHg.   • OTHER SURGICAL HISTORY  08/2015    CT abd/pelvis and chest- essentially unremarkable   • OTHER SURGICAL HISTORY  08/2015    HIDA scan- WNL   • PERICARDIAL WINDOW     • TOTAL LAPAROSCOPIC HYSTERECTOMY N/A 3/8/2018    Procedure: TOTAL LAPAROSCOPIC HYSTERECTOMY BILATERAL SALPINGECTOMY AND LEFT OOPHORECTOMY WITH DAVINCI SI ROBOT;  Surgeon: Bill Greenwood DO;  Location: [facility];  Service:        Family History:  Family History   Problem Relation Age of Onset   • Heart disease Mother    •  Hypertension Mother    • Heart failure Mother    • Hypertension Father    • Hyperlipidemia Father    • Heart disease Child    • Breast cancer Maternal Aunt    • Diabetes Maternal Aunt    • Colon cancer Maternal Uncle    • Leukemia Paternal Uncle    • Hodgkin's lymphoma Paternal Uncle    • Lung cancer Paternal Grandfather    • Leukemia Maternal Aunt        Social History:  Social History     Socioeconomic History   • Marital status: Single     Spouse name: Not on file   • Number of children: Not on file   • Years of education: Not on file   • Highest education level: Not on file   Tobacco Use   • Smoking status: Never Smoker   • Smokeless tobacco: Never Used   Substance and Sexual Activity   • Alcohol use: No   • Drug use: No   • Sexual activity: Yes     Partners: Male     Birth control/protection: Surgical     Body mass index is 36.22 kg/m². Patient's Body mass index is 36.22 kg/m². BMI is above normal parameters. Recommendations include: educational material.      Medications:  Current Outpatient Medications   Medication Sig Dispense Refill   • amitriptyline (ELAVIL) 25 MG tablet Take 2 tablets by mouth Every Night. 60 tablet 4   • carvedilol (COREG) 25 MG tablet Take 1 tablet by mouth 2 (Two) Times a Day. 60 tablet 11   • DHEA 10 MG capsule TAKE 1 CAPSULE BY MOUTH EVERY DAY  5   • FLUoxetine (PROzac) 10 MG capsule Take 10 mg by mouth Daily.     • furosemide (LASIX) 40 MG tablet Take 1 tablet by mouth Daily. 30 tablet 11   • HYDROcodone-acetaminophen (NORCO) 7.5-325 MG per tablet Take 1 tablet by mouth 3 (Three) Times a Day.     • metFORMIN (GLUCOPHAGE) 500 MG tablet Take 500 mg by mouth Daily.     • Multiple Vitamin (ONE-A-DAY ESSENTIAL PO) Take 1 tablet by mouth Daily.     • NP THYROID 30 MG tablet      • omeprazole (priLOSEC) 20 MG capsule Take 20 mg by mouth Daily.     • ONE TOUCH ULTRA TEST test strip      • ONETOUCH DELICA LANCETS 33G misc      • pregabalin (LYRICA) 150 MG capsule Take 150 mg by mouth Every  "Night.     • progesterone oil 50 MG/ML injection compound progesterone 8 gram /testosterone 150mg in propylene glycol. apply 5-6 gtts q hs.     • promethazine (PHENERGAN) 25 MG tablet Take 25 mg by mouth As Needed.     • SUMAtriptan (IMITREX) 50 MG tablet 50 mg 1 (One) Time As Needed for Migraine. Take as directed      • vitamin D (ERGOCALCIFEROL) 43737 UNITS capsule capsule Take 50,000 Units by mouth Every 7 (Seven) Days. Tuesday     • diltiaZEM (CARDIZEM) 60 MG tablet Take 1 tablet by mouth as needed for heart rate equal to or greater than 100. 30 tablet 3   • levothyroxine (SYNTHROID, LEVOTHROID) 25 MCG tablet Take 25 mcg by mouth Daily.       No current facility-administered medications for this visit.        Allergies:  Allergies   Allergen Reactions   • Codeine Hives     ITCHES   • Cymbalta [Duloxetine Hcl] Other (See Comments)     UNABLE TO MOVE MUSCLES   • Neurontin [Gabapentin] Other (See Comments)     BLADDER DOESN'T WORK    • Tramadol Hcl Rash   • Tyloxapol Rash       Review of Systems:   Review of Systems   Constitutional: Positive for fatigue.   HENT: Negative.    Eyes: Negative.    Respiratory: Negative.    Cardiovascular: Negative.    Gastrointestinal: Negative.    Endocrine: Negative.    Genitourinary: Negative.    Musculoskeletal: Positive for arthralgias and joint swelling.   Skin: Negative.    Allergic/Immunologic: Negative.    Neurological: Positive for numbness.   Hematological: Negative.    Psychiatric/Behavioral: Negative.        Physical Exam:   Physical Exam  GENERAL: 38 y.o. female, alert and oriented X 3 in no acute distress.   Visit Vitals  /80 (BP Location: Right arm, Patient Position: Sitting, Cuff Size: Adult)   Pulse 90   Ht 162.6 cm (64\")   Wt 95.7 kg (211 lb)   LMP 03/05/2018   BMI 36.22 kg/m²     Musculoskeletal:   Left lower extremity evaluation leg lengths are equal she demonstrates normal hip mobility without significant pain.  She has slight fullness in the lateral aspect " of her left hip compared to the right.  There are no palpable lesions or nodules.  She is exquisitely tender over the greater trochanteric bursa and has generalized tenderness over the mild swelling of the lateral aspect of her left hip.  Neurovascular exam to the left hip is negative straight leg raising is negative.    Radiology/Labs:   Radiographs left hip obtained today AP lateral are negative by my review.  No soft tissue mass identified lateral aspect of her left hip.  Her hip joint is normal.    Assessment & Plan:   38 y.o. female presents with complaints of lateral left hip pain she does demonstrate significant tenderness over the greater trochanter.  There is slight fullness in the lateral aspect of her left hip compared to the right.  Will begin evaluation with MRI of her left hip.  Will return once MRI is obtained.  If MRI is normal would consider steroid injection to the greater trochanteric bursa.  I do not think soft tissue mass and pain lateral aspect of her left hip are directly related.      ICD-10-CM ICD-9-CM   1. Mass of soft tissue of lower extremity left hip R22.40 782.2   2. Left hip pain M25.552 719.45   3. Lipoma of extremity left hip D17.79 214.9           Cc:   Anuel Matson APRN David J Lauber, MD              This document has been electronically signed by Graham Maki MD   June 5, 2019 9:10 PM

## 2019-06-21 ENCOUNTER — HOSPITAL ENCOUNTER (OUTPATIENT)
Dept: MRI IMAGING | Facility: HOSPITAL | Age: 39
Discharge: HOME | End: 2019-06-21
Admitting: ORTHOPAEDIC SURGERY

## 2019-06-21 DIAGNOSIS — M79.89 MASS OF SOFT TISSUE OF LOWER EXTREMITY: ICD-10-CM

## 2019-06-21 DIAGNOSIS — M25.552 LEFT HIP PAIN: ICD-10-CM

## 2019-06-21 DIAGNOSIS — D17.20 LIPOMA OF EXTREMITY: ICD-10-CM

## 2019-06-21 PROCEDURE — 73721 MRI JNT OF LWR EXTRE W/O DYE: CPT

## 2019-06-21 PROCEDURE — 73721 MRI JNT OF LWR EXTRE W/O DYE: CPT | Performed by: RADIOLOGY

## 2019-07-08 ENCOUNTER — OFFICE VISIT (OUTPATIENT)
Dept: ORTHOPEDIC SURGERY | Facility: CLINIC | Age: 39
End: 2019-07-08

## 2019-07-08 VITALS — WEIGHT: 210.98 LBS | BODY MASS INDEX: 36.02 KG/M2 | HEIGHT: 64 IN

## 2019-07-08 DIAGNOSIS — M25.552 LEFT HIP PAIN: Primary | ICD-10-CM

## 2019-07-08 DIAGNOSIS — D17.20 LIPOMA OF EXTREMITY: ICD-10-CM

## 2019-07-08 DIAGNOSIS — M70.62 TROCHANTERIC BURSITIS OF LEFT HIP: ICD-10-CM

## 2019-07-08 PROCEDURE — 99213 OFFICE O/P EST LOW 20 MIN: CPT | Performed by: ORTHOPAEDIC SURGERY

## 2019-07-08 NOTE — PROGRESS NOTES
Follow-up Visit         Patient: Renate Mitchell  YOB: 1980  Date of Encounter: 07/08/2019      Chief  Complaint:   Chief Complaint   Patient presents with   • Left Hip - Pain, Follow-up         HPI:  Renate Mitchell, 39 y.o. female returns in follow-up with her left hip complaints.  She was initially referred to Dr. Oneill but then referred for orthopedic evaluation.  She strongly suspects that she has a lipoma of her left hip.  Developed an area of the lateral aspect of her left hip about 3 months ago.  She developed pain in her left hip prior to this.  Has not changed since last visit she reports the mass may have increased in size a little bit her hip pain remains about the same.  She reports that she is received steroids on many occasions a total of about 5 and that they caused her significant problems with her diabetes.    Medical History:  Patient Active Problem List   Diagnosis   • HTN (hypertension)   • Pulmonary HTN (CMS/HCC)   • Metabolic syndrome   • Hodgkin's disease (CMS/HCC)   • LLQ abdominal pain   • Adenopathy   • LV dysfunction   • Tachycardia   • Palpitation   • Lipoma of extremity   • Left hip pain   • Class 2 severe obesity due to excess calories with serious comorbidity and body mass index (BMI) of 37.0 to 37.9 in adult (CMS/HCC)     Past Medical History:   Diagnosis Date   • Abnormal uterine bleeding (AUB)    • Arthritis    • Chest biopsy    • Depression    • Diabetes mellitus (CMS/HCC)    • Difficulty walking 2008    Due to neuropathy, sometimes I can’t put no weight on my fee   • Disease of thyroid gland    • H/O exploratory laparotomy    • H/O tubal ligation    • Heart BX , benign    • Heart disease    • History of bone marrow biopsy    • History of chemotherapy    • History of radiation therapy    • HTN (hypertension)    • Migraine    • Neuropathy     CAUSED BY RADIATIONA ND CHEMO PER PATIENT   • Non Hodgkin's lymphoma (CMS/HCC)     very close to heart, in  remission, last radiation 2/08, last chemo 12/07   • Pain     LOWER LEFT QUAD   • Pericardial effusion     s/p natty window- removed 398 cc    • Peripheral neuropathy 2008   • Tachycardia        Social History:  Social History     Socioeconomic History   • Marital status: Single     Spouse name: Not on file   • Number of children: Not on file   • Years of education: Not on file   • Highest education level: Not on file   Tobacco Use   • Smoking status: Never Smoker   • Smokeless tobacco: Never Used   Substance and Sexual Activity   • Alcohol use: No   • Drug use: No   • Sexual activity: Yes     Partners: Male     Birth control/protection: Surgical       Surgical History:  Past Surgical History:   Procedure Laterality Date   • CARDIAC BIOPSY     • CARDIAC CATHETERIZATION  05/07/2009    normal but small coronaries, EF 45-50%   • CARDIAC CATHETERIZATION  08/04/2015    essentially normal coronaries. EF 50%   • CARDIOVASCULAR STRESS TEST  01/11/2008    CESAR Cardiolyte- (Dr. Jay). EF 45% at rest, 53% with stress   • CARDIOVASCULAR STRESS TEST  02/10/2009    3 min, 20 sec. 95% THR. bp- 180/98, EF 49%. Negative   • CARDIOVASCULAR STRESS TEST  07/2015    R. Stress- 4 min, 103% THR. 106/68, negative by EKG criteria   • CARDIOVASCULAR STRESS TEST  05/18/2017    4 min 39 sec, 97% THR, 157/76, no obvious ischemia, increase Coreg   • CHOLECYSTECTOMY N/A 3/8/2018    Procedure: CHOLECYSTECTOMY LAPAROSCOPIC;  Surgeon: Artie Oneill MD;  Location: [facility];  Service:    • COLONOSCOPY N/A 12/22/2017    Procedure: COLONOSCOPY;  Surgeon: Artie Oneill MD;  Location: [facility] OR;  Service:    • CONVERTED (HISTORICAL) HOLTER  05/23/2017    sinus ave 96 bpm, 1 PVC, 4 PACs   • CONVERTED (HISTORICAL) HOLTER  08/28/2018    Avg 96, one 6 beat SVT   • ECHO - CONVERTED  02/10/2009    EF 50-55%   • ECHO - CONVERTED  07/20/2015    EF 40-45%, RVSP 43 mmHg mild MR   • ECHO - CONVERTED  05/18/2017    EF 50-55%, RVSP 37 mmHg, mild MR   • ECHO -  CONVERTED  09/05/2018    TLS. EF 55%. RVSP- 33 mmHg.   • OTHER SURGICAL HISTORY  08/2015    CT abd/pelvis and chest- essentially unremarkable   • OTHER SURGICAL HISTORY  08/2015    HIDA scan- WNL   • PERICARDIAL WINDOW     • TOTAL LAPAROSCOPIC HYSTERECTOMY N/A 3/8/2018    Procedure: TOTAL LAPAROSCOPIC HYSTERECTOMY BILATERAL SALPINGECTOMY AND LEFT OOPHORECTOMY WITH DAVINCI SI ROBOT;  Surgeon: Bill Greenwood DO;  Location: [facility] OR;  Service:        Radiology: MRI by report left hip is negative with specific evaluation for the area in question lateral aspect of her hip.  I reviewed her MRI in detail in the room with her today and there is no evidence of soft tissue mass lateral aspect of her left hip.      Examination:   Left hip evaluation reveals full mobility he does have slight prominence of the lateral aspect the left hip compared to the right but I not localize soft tissue mass by palpation.  She has significant tenderness over the lateral aspect of her hip in the region of the greater trochanteric bursa.      Assessment & Plan:   39 y.o. female clinical presentation of greater trochanteric bursitis left hip.  She was offered steroid injection for greater trochanteric bursitis but declined based on her previous steroid injections and elevation of her glucose.  No she has slight prominence lateral aspect left hip by exam MRI by report and by review is negative for soft tissue mass.  I do not think this needs further work-up.  We will follow-up in the future as needed.         Diagnosis Plan   1. Left hip pain     2. Lipoma of extremity left hip     3. Trochanteric bursitis of left hip               Cc:  Anuel Matson APRN              This document has been electronically signed by Graham Maki MD   July 11, 2019 9:59 AM

## 2019-10-22 ENCOUNTER — TELEPHONE (OUTPATIENT)
Dept: CARDIOLOGY | Facility: CLINIC | Age: 39
End: 2019-10-22

## 2019-10-22 NOTE — TELEPHONE ENCOUNTER
Received fax from Dr. Callejas for cardiac clearance for patient to have an excision of left thigh mass. According to our records, I do not see where patient has had any stenting or is on any blood thinners.       Fax [phone]

## 2020-06-29 ENCOUNTER — OFFICE VISIT (OUTPATIENT)
Dept: NEUROLOGY | Facility: CLINIC | Age: 40
End: 2020-06-29

## 2020-06-29 VITALS
HEIGHT: 64 IN | DIASTOLIC BLOOD PRESSURE: 82 MMHG | OXYGEN SATURATION: 98 % | BODY MASS INDEX: 41.15 KG/M2 | HEART RATE: 86 BPM | SYSTOLIC BLOOD PRESSURE: 108 MMHG | TEMPERATURE: 97.8 F | WEIGHT: 241 LBS

## 2020-06-29 DIAGNOSIS — G62.9 POLYNEUROPATHY: Primary | ICD-10-CM

## 2020-06-29 DIAGNOSIS — G44.099 TRIGEMINAL AUTONOMIC CEPHALGIAS: ICD-10-CM

## 2020-06-29 PROCEDURE — 99214 OFFICE O/P EST MOD 30 MIN: CPT | Performed by: PSYCHIATRY & NEUROLOGY

## 2020-06-29 RX ORDER — TIZANIDINE 4 MG/1
8 TABLET ORAL NIGHTLY
COMMUNITY
Start: 2020-06-08

## 2020-06-29 RX ORDER — IBUPROFEN 800 MG/1
TABLET ORAL AS NEEDED
COMMUNITY
Start: 2020-04-14

## 2020-06-29 RX ORDER — FLUOXETINE HYDROCHLORIDE 40 MG/1
40 CAPSULE ORAL DAILY
COMMUNITY
Start: 2020-06-08

## 2020-06-29 RX ORDER — INDOMETHACIN 50 MG/1
50 CAPSULE ORAL AS NEEDED
Qty: 30 CAPSULE | Refills: 1 | Status: SHIPPED | OUTPATIENT
Start: 2020-06-29 | End: 2022-02-03

## 2020-06-29 NOTE — PROGRESS NOTES
Subjective:    CC: Renate Mitchell is seen today for Peripheral Neuropathy       HPI:  40-year-old female with a history of non-Hodgkin's lymphoma, hypothyroidism, hypertension previously seen by Dr. Iraheta for neuropathy who wants to establish care for her neuropathy.  As per patient she started having burning pain in her feet and subsequently her hands soon after getting chemotherapy and radiation for her non-Hodgkin's lymphoma in 2008.  She was started on Neurontin previously that caused bladder problems, Topamax that caused cognitive issues, Cymbalta that made her weak in her arms and legs, amitriptyline that she stopped herself as she was unable to get refills and Lyrica that she is currently on.  She has tried to stop the Lyrica several times but is unable to function a few weeks after stopping it.  Currently on a dose of 150 mg at night.  About 3 months ago she also started having pain in the back of her head and neck that radiates up the side of her head into her ear as well as the middle and lower portion of her face as well as the insides of her mouth including the teeth.  The pain is so severe at times that she is unable to touch her face or chewing food.  Denies any vision problems.  She has had about 3-4 such episodes.  Denies having any severe headaches.  Patient states that she was a diabetic in the past but was taken off metformin after she lost weight through the keto diet.  Her last A1c was normal.    Of note-I reviewed Dr. Iraheta's notes.    The following portions of the patient's history were reviewed today and updated as of 06/29/2020  : allergies, current medications, past family history, past medical history, past social history, past surgical history and problem list  These document will be scanned to patient's chart.      Current Outpatient Medications:   •  carvedilol (COREG) 25 MG tablet, Take 1 tablet by mouth 2 (Two) Times a Day., Disp: 60 tablet, Rfl: 11  •  furosemide (LASIX) 40 MG  tablet, Take 1 tablet by mouth Daily., Disp: 30 tablet, Rfl: 11  •  HYDROcodone-acetaminophen (NORCO) 7.5-325 MG per tablet, Take 1 tablet by mouth 3 (Three) Times a Day., Disp: , Rfl:   •  levothyroxine (SYNTHROID, LEVOTHROID) 25 MCG tablet, Take 25 mcg by mouth Daily., Disp: , Rfl:   •  omeprazole (priLOSEC) 20 MG capsule, Take 20 mg by mouth Daily., Disp: , Rfl:   •  pregabalin (LYRICA) 150 MG capsule, Take 150 mg by mouth Every Night., Disp: , Rfl:   •  promethazine (PHENERGAN) 25 MG tablet, Take 25 mg by mouth As Needed., Disp: , Rfl:   •  SUMAtriptan (IMITREX) 50 MG tablet, 50 mg 1 (One) Time As Needed for Migraine. Take as directed , Disp: , Rfl:   •  vitamin D (ERGOCALCIFEROL) 56686 UNITS capsule capsule, Take 50,000 Units by mouth Every 7 (Seven) Days. Tuesday, Disp: , Rfl:   •  FLUoxetine (PROzac) 20 MG capsule, Take 1 capsule by mouth Daily., Disp: , Rfl:   •  ibuprofen (ADVIL,MOTRIN) 800 MG tablet, , Disp: , Rfl:   •  indomethacin (INDOCIN) 50 MG capsule, Take 1 capsule by mouth As Needed for Mild Pain  or Moderate Pain ., Disp: 30 capsule, Rfl: 1  •  tiZANidine (ZANAFLEX) 4 MG tablet, , Disp: , Rfl:    Past Medical History:   Diagnosis Date   • Abnormal uterine bleeding (AUB)    • Arthritis    • Chest biopsy    • Depression    • Diabetes mellitus (CMS/HCC)    • Difficulty walking 2008    Due to neuropathy, sometimes I can’t put no weight on my fee   • Disease of thyroid gland    • H/O exploratory laparotomy    • H/O tubal ligation    • Heart BX , benign    • Heart disease    • History of bone marrow biopsy    • History of chemotherapy    • History of radiation therapy    • HTN (hypertension)    • Migraine    • Neuropathy     CAUSED BY RADIATIONA ND CHEMO PER PATIENT   • Non Hodgkin's lymphoma (CMS/HCC)     very close to heart, in remission, last radiation 2/08, last chemo 12/07   • Pain     LOWER LEFT QUAD   • Pericardial effusion     s/p natty window- removed 398 cc    • Peripheral neuropathy 2008   •  Pulmonary HTN (CMS/HCC)    • Tachycardia       Past Surgical History:   Procedure Laterality Date   • CARDIAC BIOPSY     • CARDIAC CATHETERIZATION  05/07/2009    normal but small coronaries, EF 45-50%   • CARDIAC CATHETERIZATION  08/04/2015    essentially normal coronaries. EF 50%   • CARDIOVASCULAR STRESS TEST  01/11/2008    CESAR Cardiolyte- (Dr. Jay). EF 45% at rest, 53% with stress   • CARDIOVASCULAR STRESS TEST  02/10/2009    3 min, 20 sec. 95% THR. bp- 180/98, EF 49%. Negative   • CARDIOVASCULAR STRESS TEST  07/2015    R. Stress- 4 min, 103% THR. 106/68, negative by EKG criteria   • CARDIOVASCULAR STRESS TEST  05/18/2017    4 min 39 sec, 97% THR, 157/76, no obvious ischemia, increase Coreg   • CHOLECYSTECTOMY N/A 3/8/2018    Procedure: CHOLECYSTECTOMY LAPAROSCOPIC;  Surgeon: Artie Oneill MD;  Location: [facility];  Service:    • COLONOSCOPY N/A 12/22/2017    Procedure: COLONOSCOPY;  Surgeon: Artie Oneill MD;  Location: [facility] OR;  Service:    • CONVERTED (HISTORICAL) HOLTER  05/23/2017    sinus ave 96 bpm, 1 PVC, 4 PACs   • CONVERTED (HISTORICAL) HOLTER  08/28/2018    Avg 96, one 6 beat SVT   • ECHO - CONVERTED  02/10/2009    EF 50-55%   • ECHO - CONVERTED  07/20/2015    EF 40-45%, RVSP 43 mmHg mild MR   • ECHO - CONVERTED  05/18/2017    EF 50-55%, RVSP 37 mmHg, mild MR   • ECHO - CONVERTED  09/05/2018    TLS. EF 55%. RVSP- 33 mmHg.   • OTHER SURGICAL HISTORY  08/2015    CT abd/pelvis and chest- essentially unremarkable   • OTHER SURGICAL HISTORY  08/2015    HIDA scan- WNL   • PERICARDIAL WINDOW     • TOTAL LAPAROSCOPIC HYSTERECTOMY N/A 3/8/2018    Procedure: TOTAL LAPAROSCOPIC HYSTERECTOMY BILATERAL SALPINGECTOMY AND LEFT OOPHORECTOMY WITH DAVINCI SI ROBOT;  Surgeon: Bill Greenwood DO;  Location: [facility];  Service:       Family History   Problem Relation Age of Onset   • Heart disease Mother    • Hypertension Mother    • Heart failure Mother    • Hypertension Father    • Hyperlipidemia Father   "  • Heart disease Child    • Breast cancer Maternal Aunt    • Diabetes Maternal Aunt    • Colon cancer Maternal Uncle    • Leukemia Paternal Uncle    • Hodgkin's lymphoma Paternal Uncle    • Lung cancer Paternal Grandfather    • Leukemia Maternal Aunt       Social History     Socioeconomic History   • Marital status: Single     Spouse name: Not on file   • Number of children: Not on file   • Years of education: Not on file   • Highest education level: Not on file   Tobacco Use   • Smoking status: Never Smoker   • Smokeless tobacco: Never Used   Substance and Sexual Activity   • Alcohol use: No   • Drug use: No   • Sexual activity: Yes     Partners: Male     Birth control/protection: Surgical     Review of Systems   Constitutional: Negative.    HENT: Negative.    Eyes: Negative.    Respiratory: Negative.    Gastrointestinal: Negative.    Endocrine: Negative.    Genitourinary: Negative.    Musculoskeletal: Negative.    Skin: Negative.    Allergic/Immunologic: Negative.    Neurological: Negative.    Hematological: Negative.    Psychiatric/Behavioral: Negative.    All other systems reviewed and are negative.      Objective:    /82   Pulse 86   Temp 97.8 °F (36.6 °C)   Ht 162.6 cm (64\")   Wt 109 kg (241 lb)   LMP 03/05/2018   SpO2 98%   BMI 41.37 kg/m²     Neurology Exam:    General apperance: Obese    Mental status: Alert, awake and oriented to time place and person.    Recent and Remote memory: Intact.    Attention span and Concentration: Normal.     Language and Speech: Intact- No dysarthria.    Fluency, Naming , Repitition and Comprehension:  Intact    Cranial Nerves:   CN II: Visual fields are full. Intact. Fundi - Normal, No papillederma, Pupils - AMADOU  CN III, IV and VI: Extraocular movements are intact. Normal saccades.   CN V: Facial sensation is intact.   CN VII: Muscles of facial expression reveal no asymmetry. Intact.   CN VIII: Hearing is intact. Whispered voice intact.   CN IX and X: Palate " elevates symmetrically. Intact  CN XI: Shoulder shrug is intact.   CN XII: Tongue is midline without evidence of atrophy or fasciculation.     Ophthalmoscopic exam of optic disc-normal    Motor:  Right UE muscle strength 5/5. Normal tone.     Left UE muscle strength 5/5. Normal tone.      Right LE muscle strength5/5. Normal tone.     Left LE muscle strength 5/5. Normal tone.      Sensory: Mildly reduced to pinprick in both feet till above ankles as well as slightly reduced vibration in lower extremities.  No tenderness to palpation of her face    DTRs: 2+ bilaterally in upper and lower extremities.    Babinski: Negative bilaterally.    Co-ordination: Normal finger-to-nose, heel to shin B/L.    Rhomberg: Negative.    Gait: Normal.    Cardiovascular: Regular rate and rhythm without murmur, gallop or rub.    Assessment and Plan:  1. Polyneuropathy  Most likely due to her radiation and chemotherapy  She should continue Lyrica 150 mg at night  I have also told her to get labs at her PCPs office including a thyroid function test, A1c level and B12 level    2. Trigeminal autonomic cephalgias  The pain that she is experiencing could be due to irritation of the trigeminal nerve  I will get a MRI brain with and without contrast to rule out any lesions compressing the nerve  I will give her a prescription of indomethacin 50 mg that she can try at the onset of her pain as it comes on occasionally  - MRI Brain With & Without Contrast; Future       Return in about 2 months (around 8/29/2020).       Melva David MD    Answers for HPI/ROS submitted by the patient on 6/28/2020   What is the primary reason for your visit?: Other  Please describe your symptoms.: Neuropathy on my left side has moved up the left side of my face now.  Have you had these symptoms before?: No  How long have you been having these symptoms?: Greater than 2 weeks  Please list any medications you are currently taking for this condition.: Lyrica  Please  describe any probable cause for these symptoms. : The chemo and radiation treatments caused the neuropathy. I’ve had neuropathy since 2008 and it has just been getting worse.

## 2020-07-04 ENCOUNTER — HOSPITAL ENCOUNTER (OUTPATIENT)
Dept: MRI IMAGING | Facility: HOSPITAL | Age: 40
Discharge: HOME | End: 2020-07-04
Admitting: PSYCHIATRY & NEUROLOGY

## 2020-07-04 DIAGNOSIS — G44.099 TRIGEMINAL AUTONOMIC CEPHALGIAS: ICD-10-CM

## 2020-07-04 PROCEDURE — 70553 MRI BRAIN STEM W/O & W/DYE: CPT

## 2020-07-04 PROCEDURE — 0 GADOBENATE DIMEGLUMINE 529 MG/ML SOLUTION: Performed by: PSYCHIATRY & NEUROLOGY

## 2020-07-04 PROCEDURE — A9577 INJ MULTIHANCE: HCPCS | Performed by: PSYCHIATRY & NEUROLOGY

## 2020-07-04 RX ADMIN — GADOBENATE DIMEGLUMINE 20 ML: 529 INJECTION, SOLUTION INTRAVENOUS at 13:25

## 2020-07-06 ENCOUNTER — TELEPHONE (OUTPATIENT)
Dept: NEUROLOGY | Facility: CLINIC | Age: 40
End: 2020-07-06

## 2020-07-06 NOTE — TELEPHONE ENCOUNTER
----- Message from Melva David MD sent at 7/6/2020  9:41 AM EDT -----  Please let the patient know that her MRI brain was normal

## 2020-07-20 ENCOUNTER — TELEPHONE (OUTPATIENT)
Dept: NEUROLOGY | Facility: CLINIC | Age: 40
End: 2020-07-20

## 2020-07-20 NOTE — TELEPHONE ENCOUNTER
It just means that it could be an age-related change which could be due to hypertension, high cholesterol etc. it is not a stroke

## 2020-07-20 NOTE — TELEPHONE ENCOUNTER
----- Message from Renate Mitchell sent at 7/17/2020  9:29 PM EDT -----  Regarding: Test Results Question  Contact: [phone]  I was looking at my MRI results and was wondering what this part means: Nonspecific 4 mm area of increased signal seen in the right  parietal lobe in the subcortical white matter suggesting possible  chronic small vessel ischemic change.  I appreciate your help.

## 2020-07-21 ENCOUNTER — TRANSCRIBE ORDERS (OUTPATIENT)
Dept: ADMINISTRATIVE | Facility: HOSPITAL | Age: 40
End: 2020-07-21

## 2020-07-21 DIAGNOSIS — Z85.72 PERSONAL HISTORY OF MALIGNANT LYMPHOMA: Primary | ICD-10-CM

## 2020-07-28 ENCOUNTER — HOSPITAL ENCOUNTER (OUTPATIENT)
Dept: MRI IMAGING | Facility: HOSPITAL | Age: 40
Discharge: HOME | End: 2020-07-28
Admitting: RADIOLOGY

## 2020-07-28 DIAGNOSIS — Z85.72 PERSONAL HISTORY OF MALIGNANT LYMPHOMA: ICD-10-CM

## 2020-07-28 LAB — CREAT BLDA-MCNC: 0.7 MG/DL (ref 0.6–1.3)

## 2020-07-28 PROCEDURE — 0 GADOBENATE DIMEGLUMINE 529 MG/ML SOLUTION: Performed by: RADIOLOGY

## 2020-07-28 PROCEDURE — 82565 ASSAY OF CREATININE: CPT

## 2020-07-28 PROCEDURE — 77049 MRI BREAST C-+ W/CAD BI: CPT

## 2020-07-28 PROCEDURE — 77049 MRI BREAST C-+ W/CAD BI: CPT | Performed by: RADIOLOGY

## 2020-07-28 PROCEDURE — A9577 INJ MULTIHANCE: HCPCS | Performed by: RADIOLOGY

## 2020-07-28 RX ADMIN — GADOBENATE DIMEGLUMINE 20 ML: 529 INJECTION, SOLUTION INTRAVENOUS at 14:00

## 2020-08-17 ENCOUNTER — CLINICAL SUPPORT (OUTPATIENT)
Dept: CARDIOLOGY | Facility: CLINIC | Age: 40
End: 2020-08-17

## 2020-08-17 ENCOUNTER — OFFICE VISIT (OUTPATIENT)
Dept: CARDIOLOGY | Facility: CLINIC | Age: 40
End: 2020-08-17

## 2020-08-17 VITALS
WEIGHT: 226.8 LBS | TEMPERATURE: 98.1 F | SYSTOLIC BLOOD PRESSURE: 140 MMHG | HEART RATE: 76 BPM | DIASTOLIC BLOOD PRESSURE: 82 MMHG | HEIGHT: 64 IN | BODY MASS INDEX: 38.72 KG/M2

## 2020-08-17 DIAGNOSIS — R06.09 DYSPNEA ON EXERTION: ICD-10-CM

## 2020-08-17 DIAGNOSIS — R00.2 PALPITATION: ICD-10-CM

## 2020-08-17 DIAGNOSIS — I27.20 PULMONARY HTN (HCC): ICD-10-CM

## 2020-08-17 DIAGNOSIS — R00.0 TACHYCARDIA: ICD-10-CM

## 2020-08-17 DIAGNOSIS — I51.9 LV DYSFUNCTION: ICD-10-CM

## 2020-08-17 DIAGNOSIS — E66.01 CLASS 2 SEVERE OBESITY DUE TO EXCESS CALORIES WITH SERIOUS COMORBIDITY AND BODY MASS INDEX (BMI) OF 37.0 TO 37.9 IN ADULT (HCC): ICD-10-CM

## 2020-08-17 DIAGNOSIS — I10 ESSENTIAL HYPERTENSION: ICD-10-CM

## 2020-08-17 PROCEDURE — 93000 ELECTROCARDIOGRAM COMPLETE: CPT | Performed by: NURSE PRACTITIONER

## 2020-08-17 PROCEDURE — 99214 OFFICE O/P EST MOD 30 MIN: CPT | Performed by: NURSE PRACTITIONER

## 2020-08-17 PROCEDURE — 0296T PR EXT ECG > 48HR TO 21 DAY RCRD W/CONECT INTL RCRD: CPT | Performed by: INTERNAL MEDICINE

## 2020-08-17 NOTE — PROGRESS NOTES
Chief Complaint   Patient presents with   • Follow-up     cardiac management . will have labs done Wednesday .    • Rapid Heart Rate     And SOA With minimal exertion . and up   • Chest Pain     Has occasional brief episodes of chest pain   • Med Refill     No refills needed today. PCP manages refills. Reviewed meds verbally       Subjective       Renate Padmini Mitchell is a 40 y.o. female  with a history of palpitations, chest pain, shortness of breath, and non-Hodgkin's lymphoma for which she has undergone chemo and radiation in the past with the last in 2008. She was referred to [facility] for worsening shortness of breath. In May of 2015 she was seen for pericarditis vs worsening lymphoma. Workup showed essentially normal coronaries and mild LV dysfunction. In 2017, she had more edema and CP. Stress showed no ischemia but hypertensive and HR response.  Echo showed EF improved to 55%. Holter showed baseline sinus with average rate 96 bpm, no arrhythmia. Coreg increased. In October 2017, an overnight oxygen was normal. Sleep study done 9/10/18 was normal. She reported more shortness of breath and tachycardia, Coreg increased to 25 mg BID. Holter and echo showed similar findings of normal EF, HR upper limit of normal. No changes.     Today she comes to the office for a follow up visit.  She is concerned over increased heart rate with minimal exertion.  Just walking short distance she feels her heart racing and develops smothering type sensation.  She has not had dizziness or near syncope.  Occasionally she has brief episodes of chest discomfort.  This occurs randomly, not necessarily with exertion.       Cardiac History:    Past Surgical History:   Procedure Laterality Date   • CARDIAC BIOPSY     • CARDIAC CATHETERIZATION  05/07/2009    normal but small coronaries, EF 45-50%   • CARDIAC CATHETERIZATION  08/04/2015    essentially normal coronaries. EF 50%   • CARDIOVASCULAR STRESS TEST  01/11/2008    A. Cardiolyte-  (Dr. Jay). EF 45% at rest, 53% with stress   • CARDIOVASCULAR STRESS TEST  02/10/2009    3 min, 20 sec. 95% THR. bp- 180/98, EF 49%. Negative   • CARDIOVASCULAR STRESS TEST  07/2015    R. Stress- 4 min, 103% THR. 106/68, negative by EKG criteria   • CARDIOVASCULAR STRESS TEST  05/18/2017    4 min 39 sec, 97% THR, 157/76, no obvious ischemia, increase Coreg   • CHOLECYSTECTOMY N/A 3/8/2018    Procedure: CHOLECYSTECTOMY LAPAROSCOPIC;  Surgeon: Artie Oneill MD;  Location: [facility] OR;  Service:    • COLONOSCOPY N/A 12/22/2017    Procedure: COLONOSCOPY;  Surgeon: Artie Oneill MD;  Location: [facility] OR;  Service:    • CONVERTED (HISTORICAL) HOLTER  05/23/2017    sinus ave 96 bpm, 1 PVC, 4 PACs   • CONVERTED (HISTORICAL) HOLTER  08/28/2018    Avg 96, one 6 beat SVT   • ECHO - CONVERTED  02/10/2009    EF 50-55%   • ECHO - CONVERTED  07/20/2015    EF 40-45%, RVSP 43 mmHg mild MR   • ECHO - CONVERTED  05/18/2017    EF 50-55%, RVSP 37 mmHg, mild MR   • ECHO - CONVERTED  09/05/2018    TLS. EF 55%. RVSP- 33 mmHg.   • OTHER SURGICAL HISTORY  08/2015    CT abd/pelvis and chest- essentially unremarkable   • OTHER SURGICAL HISTORY  08/2015    HIDA scan- WNL   • PERICARDIAL WINDOW     • TOTAL LAPAROSCOPIC HYSTERECTOMY N/A 3/8/2018    Procedure: TOTAL LAPAROSCOPIC HYSTERECTOMY BILATERAL SALPINGECTOMY AND LEFT OOPHORECTOMY WITH DAVINCI SI ROBOT;  Surgeon: Bill Greenwood DO;  Location: [facility] OR;  Service:        Current Outpatient Medications   Medication Sig Dispense Refill   • carvedilol (COREG) 25 MG tablet Take 1 tablet by mouth 2 (Two) Times a Day. 60 tablet 11   • FLUoxetine (PROzac) 20 MG capsule Take 1 capsule by mouth Daily.     • furosemide (LASIX) 40 MG tablet Take 1 tablet by mouth Daily. 30 tablet 11   • HYDROcodone-acetaminophen (NORCO) 7.5-325 MG per tablet Take 1 tablet by mouth 3 (Three) Times a Day.     • ibuprofen (ADVIL,MOTRIN) 800 MG tablet      • levothyroxine (SYNTHROID, LEVOTHROID) 25 MCG tablet  Take 25 mcg by mouth Daily.     • omeprazole (priLOSEC) 20 MG capsule Take 20 mg by mouth Daily.     • pregabalin (LYRICA) 150 MG capsule Take 150 mg by mouth Every Night.     • promethazine (PHENERGAN) 25 MG tablet Take 25 mg by mouth As Needed.     • SUMAtriptan (IMITREX) 50 MG tablet 50 mg 1 (One) Time As Needed for Migraine. Take as directed      • tiZANidine (ZANAFLEX) 4 MG tablet Take 8 mg by mouth Every Night.     • vitamin D (ERGOCALCIFEROL) 22080 UNITS capsule capsule Take 50,000 Units by mouth Every 7 (Seven) Days. Tuesday     • indomethacin (INDOCIN) 50 MG capsule Take 1 capsule by mouth As Needed for Mild Pain  or Moderate Pain . 30 capsule 1     No current facility-administered medications for this visit.        Codeine; Cymbalta [duloxetine hcl]; Neurontin [gabapentin]; Prednisolone; Tramadol hcl; and Tyloxapol    Past Medical History:   Diagnosis Date   • Abnormal uterine bleeding (AUB)    • Arthritis    • Chest biopsy    • Depression    • Diabetes mellitus (CMS/HCC)    • Difficulty walking 2008    Due to neuropathy, sometimes I can’t put no weight on my fee   • Disease of thyroid gland    • H/O exploratory laparotomy    • H/O tubal ligation    • Heart BX , benign    • Heart disease    • History of bone marrow biopsy    • History of chemotherapy    • History of radiation therapy    • HTN (hypertension)    • Migraine    • Neuropathy     CAUSED BY RADIATIONA ND CHEMO PER PATIENT   • Non Hodgkin's lymphoma (CMS/HCC)     very close to heart, in remission, last radiation 2/08, last chemo 12/07   • Pain     LOWER LEFT QUAD   • Pericardial effusion     s/p natty window- removed 398 cc    • Peripheral neuropathy 2008   • Pulmonary HTN (CMS/HCC)    • Tachycardia        Social History     Socioeconomic History   • Marital status: Single     Spouse name: Not on file   • Number of children: Not on file   • Years of education: Not on file   • Highest education level: Not on file   Tobacco Use   • Smoking status:  "Never Smoker   • Smokeless tobacco: Never Used   Substance and Sexual Activity   • Alcohol use: No   • Drug use: No   • Sexual activity: Yes     Partners: Male     Birth control/protection: Surgical       Family History   Problem Relation Age of Onset   • Heart disease Mother    • Hypertension Mother    • Heart failure Mother    • Hypertension Father    • Hyperlipidemia Father    • Heart disease Child    • Breast cancer Maternal Aunt    • Diabetes Maternal Aunt    • Colon cancer Maternal Uncle    • Leukemia Paternal Uncle    • Hodgkin's lymphoma Paternal Uncle    • Lung cancer Paternal Grandfather    • Leukemia Maternal Aunt        Review of Systems   Constitution: Negative for decreased appetite, diaphoresis and malaise/fatigue.   HENT: Negative for nosebleeds.    Eyes: Negative for blurred vision.   Cardiovascular: Positive for dyspnea on exertion. Negative for chest pain, claudication, cyanosis, irregular heartbeat, leg swelling, near-syncope, orthopnea, palpitations, paroxysmal nocturnal dyspnea and syncope.   Respiratory: Positive for shortness of breath. Negative for snoring.    Endocrine: Negative for cold intolerance and heat intolerance.   Hematologic/Lymphatic: Does not bruise/bleed easily.   Skin: Negative for rash.   Musculoskeletal: Positive for joint pain (left hip r/t large lymphoma, drained by Dr. Callejas). Negative for myalgias.   Gastrointestinal: Negative for heartburn, melena and nausea.   Genitourinary: Negative for dysuria and hematuria.   Neurological: Negative for dizziness and light-headedness.   Psychiatric/Behavioral: The patient does not have insomnia and is not nervous/anxious.         Objective     /82 (BP Location: Left arm)   Pulse 76   Temp 98.1 °F (36.7 °C)   Ht 162.6 cm (64\")   Wt 103 kg (226 lb 12.8 oz)   LMP 03/05/2018   BMI 38.93 kg/m²     Physical Exam   Constitutional: She is oriented to person, place, and time. She appears well-nourished.   HENT:   Head: " Normocephalic.   Eyes: Pupils are equal, round, and reactive to light. Conjunctivae are normal.   Neck: Normal range of motion. Neck supple. Carotid bruit is not present.   Cardiovascular: Normal rate, regular rhythm, S1 normal and S2 normal.   No murmur heard.  Pulmonary/Chest: Effort normal and breath sounds normal.   Abdominal: Soft. Bowel sounds are normal. There is no tenderness.   Musculoskeletal: Normal range of motion. She exhibits no edema.   Neurological: She is alert and oriented to person, place, and time.   Skin: Skin is warm. No pallor.   Psychiatric: She has a normal mood and affect. Her behavior is normal.          ECG 12 Lead  Date/Time: 8/17/2020 3:20 PM  Performed by: Cassi Teran APRN  Authorized by: Cassi Teran APRN   Comparison: compared with previous ECG from 3/4/2018  Similar to previous ECG  Rhythm: sinus rhythm  BPM: 76  Other findings: non-specific ST-T wave changes  Comments: Possible LVH              Problem List Items Addressed This Visit        Cardiovascular and Mediastinum    HTN (hypertension)    Pulmonary HTN (CMS/HCC)    Relevant Orders    Adult Transthoracic Echo Complete W/ Cont if Necessary Per Protocol    LV dysfunction    Relevant Orders    Adult Transthoracic Echo Complete W/ Cont if Necessary Per Protocol    ECG 12 Lead    Tachycardia    Relevant Orders    Holter Monitor - 72 Hour Up To 21 Days    Adult Transthoracic Echo Complete W/ Cont if Necessary Per Protocol    ECG 12 Lead    Palpitation    Relevant Orders    Holter Monitor - 72 Hour Up To 21 Days       Digestive    Class 2 severe obesity due to excess calories with serious comorbidity and body mass index (BMI) of 37.0 to 37.9 in adult (CMS/[facility])      Other Visit Diagnoses     Dyspnea on exertion        Relevant Orders    Holter Monitor - 72 Hour Up To 21 Days    Adult Transthoracic Echo Complete W/ Cont if Necessary Per Protocol         Patient presents today with concern over increased heart rate with very  little exertion.  She has developed dyspnea with exertion.  EKG shows sinus rhythm with resting heart rate of 76 bpm.  Nonspecific T wave abnormalities noted.  Could have possible LVH or normal variant. To relook at LV function, valvular structure, and PA pressure a repeat echocardiogram ordered.  To assess for any significant arrhythmias a 72-hour cardiac monitor placed.  At this time continue carvedilol 25 mg twice a day.  In the past she was given Cardizem to use if needed but has never taken a dose.  Further medication recommendations based on test results.    Patient's Body mass index is 38.93 kg/m². BMI is above normal parameters. Recommendations include: nutrition counseling.  Her weight is down 15 pounds since last office visit. Good hydration encouraged. She avoids caffeine.      Labs to be ordered by PCP. Please forward a copy of results when available.     A 6-month follow-up visit scheduled.  Please call sooner for cardiac concerns.           Electronically signed by KELLEY Sanchez,  August 17, 2020 16:49

## 2020-08-25 ENCOUNTER — HOSPITAL ENCOUNTER (OUTPATIENT)
Dept: CARDIOLOGY | Facility: HOSPITAL | Age: 40
Discharge: HOME | End: 2020-08-25
Admitting: NURSE PRACTITIONER

## 2020-08-25 VITALS — WEIGHT: 227.07 LBS | BODY MASS INDEX: 38.77 KG/M2 | HEIGHT: 64 IN

## 2020-08-25 DIAGNOSIS — I51.9 LV DYSFUNCTION: ICD-10-CM

## 2020-08-25 DIAGNOSIS — R00.0 TACHYCARDIA: ICD-10-CM

## 2020-08-25 DIAGNOSIS — I27.20 PULMONARY HTN (HCC): ICD-10-CM

## 2020-08-25 DIAGNOSIS — R06.09 DYSPNEA ON EXERTION: ICD-10-CM

## 2020-08-25 LAB
AORTIC DIMENSIONLESS INDEX: 0.6 (DI)
BH CV ECHO MEAS - ACS: 1.4 CM
BH CV ECHO MEAS - AO MAX PG (FULL): 3.3 MMHG
BH CV ECHO MEAS - AO MAX PG: 5.6 MMHG
BH CV ECHO MEAS - AO MEAN PG (FULL): 2 MMHG
BH CV ECHO MEAS - AO MEAN PG: 3 MMHG
BH CV ECHO MEAS - AO ROOT AREA (BSA CORRECTED): 1.4
BH CV ECHO MEAS - AO ROOT AREA: 5.7 CM^2
BH CV ECHO MEAS - AO ROOT DIAM: 2.7 CM
BH CV ECHO MEAS - AO V2 MAX: 118 CM/SEC
BH CV ECHO MEAS - AO V2 MEAN: 79.1 CM/SEC
BH CV ECHO MEAS - AO V2 VTI: 28.7 CM
BH CV ECHO MEAS - BSA(HAYCOCK): 2.1 M^2
BH CV ECHO MEAS - BSA: 2 M^2
BH CV ECHO MEAS - BZI_BMI: 36 KILOGRAMS/M^2
BH CV ECHO MEAS - BZI_METRIC_HEIGHT: 162.6 CM
BH CV ECHO MEAS - BZI_METRIC_WEIGHT: 95.3 KG
BH CV ECHO MEAS - EDV(CUBED): 139.8 ML
BH CV ECHO MEAS - EDV(TEICH): 128.9 ML
BH CV ECHO MEAS - EF(CUBED): 67.5 %
BH CV ECHO MEAS - EF(TEICH): 58.6 %
BH CV ECHO MEAS - ESV(CUBED): 45.5 ML
BH CV ECHO MEAS - ESV(TEICH): 53.3 ML
BH CV ECHO MEAS - FS: 31.2 %
BH CV ECHO MEAS - IVS/LVPW: 0.88
BH CV ECHO MEAS - IVSD: 0.73 CM
BH CV ECHO MEAS - LA DIMENSION: 3.5 CM
BH CV ECHO MEAS - LA/AO: 1.3
BH CV ECHO MEAS - LAT PEAK E' VEL: 12.7 CM/SEC
BH CV ECHO MEAS - LV IVRT: 0.12 SEC
BH CV ECHO MEAS - LV MASS(C)D: 140.4 GRAMS
BH CV ECHO MEAS - LV MASS(C)DI: 70.3 GRAMS/M^2
BH CV ECHO MEAS - LV MAX PG: 2.3 MMHG
BH CV ECHO MEAS - LV MEAN PG: 1 MMHG
BH CV ECHO MEAS - LV V1 MAX: 75.1 CM/SEC
BH CV ECHO MEAS - LV V1 MEAN: 44.6 CM/SEC
BH CV ECHO MEAS - LV V1 VTI: 15.7 CM
BH CV ECHO MEAS - LVIDD: 5.2 CM
BH CV ECHO MEAS - LVIDS: 3.6 CM
BH CV ECHO MEAS - LVPWD: 0.83 CM
BH CV ECHO MEAS - MED PEAK E' VEL: 10.3 CM/SEC
BH CV ECHO MEAS - MV A MAX VEL: 43.9 CM/SEC
BH CV ECHO MEAS - MV DEC SLOPE: 576 CM/SEC^2
BH CV ECHO MEAS - MV DEC TIME: 0.2 SEC
BH CV ECHO MEAS - MV E MAX VEL: 93.6 CM/SEC
BH CV ECHO MEAS - MV E/A: 2.1
BH CV ECHO MEAS - MV MAX PG: 3 MMHG
BH CV ECHO MEAS - MV MEAN PG: 1 MMHG
BH CV ECHO MEAS - MV P1/2T MAX VEL: 94.7 CM/SEC
BH CV ECHO MEAS - MV P1/2T: 48.2 MSEC
BH CV ECHO MEAS - MV V2 MAX: 86.1 CM/SEC
BH CV ECHO MEAS - MV V2 MEAN: 49.5 CM/SEC
BH CV ECHO MEAS - MV V2 VTI: 22.2 CM
BH CV ECHO MEAS - MVA P1/2T LCG: 2.3 CM^2
BH CV ECHO MEAS - MVA(P1/2T): 4.6 CM^2
BH CV ECHO MEAS - PA MAX PG (FULL): 2.3 MMHG
BH CV ECHO MEAS - PA MAX PG: 4 MMHG
BH CV ECHO MEAS - PA MEAN PG (FULL): 1 MMHG
BH CV ECHO MEAS - PA MEAN PG: 2 MMHG
BH CV ECHO MEAS - PA V2 MAX: 100 CM/SEC
BH CV ECHO MEAS - PA V2 MEAN: 72 CM/SEC
BH CV ECHO MEAS - PA V2 VTI: 24.7 CM
BH CV ECHO MEAS - RAP SYSTOLE: 10 MMHG
BH CV ECHO MEAS - RV MAX PG: 1.7 MMHG
BH CV ECHO MEAS - RV MEAN PG: 1 MMHG
BH CV ECHO MEAS - RV V1 MAX: 65.9 CM/SEC
BH CV ECHO MEAS - RV V1 MEAN: 45.6 CM/SEC
BH CV ECHO MEAS - RV V1 VTI: 15.6 CM
BH CV ECHO MEAS - RVDD: 2 CM
BH CV ECHO MEAS - RVSP: 40 MMHG
BH CV ECHO MEAS - SI(AO): 82.3 ML/M^2
BH CV ECHO MEAS - SI(CUBED): 47.2 ML/M^2
BH CV ECHO MEAS - SI(TEICH): 37.8 ML/M^2
BH CV ECHO MEAS - SV(AO): 164.3 ML
BH CV ECHO MEAS - SV(CUBED): 94.3 ML
BH CV ECHO MEAS - SV(TEICH): 75.6 ML
BH CV ECHO MEAS - TR MAX VEL: 272 CM/SEC
BH CV ECHO MEASUREMENTS AVERAGE E/E' RATIO: 8.14
MAXIMAL PREDICTED HEART RATE: 178 BPM
STRESS TARGET HR: 151 BPM

## 2020-08-25 PROCEDURE — 93306 TTE W/DOPPLER COMPLETE: CPT

## 2020-08-25 PROCEDURE — 93306 TTE W/DOPPLER COMPLETE: CPT | Performed by: INTERNAL MEDICINE

## 2020-08-28 ENCOUNTER — OUTSIDE FACILITY SERVICE (OUTPATIENT)
Dept: CARDIOLOGY | Facility: CLINIC | Age: 40
End: 2020-08-28

## 2020-08-28 PROCEDURE — 0298T PR EXT ECG > 48HR TO 21 DAY REVIEW AND INTERPRETATN: CPT | Performed by: INTERNAL MEDICINE

## 2020-08-31 ENCOUNTER — OFFICE VISIT (OUTPATIENT)
Dept: NEUROLOGY | Facility: CLINIC | Age: 40
End: 2020-08-31

## 2020-08-31 ENCOUNTER — TELEPHONE (OUTPATIENT)
Dept: NEUROLOGY | Facility: CLINIC | Age: 40
End: 2020-08-31

## 2020-08-31 VITALS
HEART RATE: 82 BPM | SYSTOLIC BLOOD PRESSURE: 128 MMHG | TEMPERATURE: 98.6 F | OXYGEN SATURATION: 100 % | WEIGHT: 234 LBS | RESPIRATION RATE: 16 BRPM | BODY MASS INDEX: 39.95 KG/M2 | HEIGHT: 64 IN | DIASTOLIC BLOOD PRESSURE: 80 MMHG

## 2020-08-31 DIAGNOSIS — G44.099 TRIGEMINAL AUTONOMIC CEPHALGIAS: ICD-10-CM

## 2020-08-31 DIAGNOSIS — M54.31 SCIATICA OF RIGHT SIDE: ICD-10-CM

## 2020-08-31 DIAGNOSIS — G62.9 POLYNEUROPATHY: Primary | ICD-10-CM

## 2020-08-31 PROCEDURE — 99214 OFFICE O/P EST MOD 30 MIN: CPT | Performed by: PSYCHIATRY & NEUROLOGY

## 2020-08-31 NOTE — TELEPHONE ENCOUNTER
Called and spoke to pt informing of scheduled appt at [facility] 9/2 @ 2:30. Pt stated verbal understanding and appreciation.     Called and spoke to Shaila with [facility] ([phone]) . Faxed order, notes, demographics, insurance, and consent ([phone]). Thanks.

## 2020-08-31 NOTE — PROGRESS NOTES
Subjective:    CC: Renate Mitchell is seen today for Polyneuropathy       HPI:  Current visit- since the last visit patient states that she has not had any more episodes of her occipital headaches that radiate to her face.  In fact she has not had to take indomethacin.  Her MRI of the brain that I personally reviewed did not show any acute intracranial abnormalities or severe chronic ischemic changes.  With regards to her neuropathy she had blood work including a thyroid function test that was normal, B12 of 499 and an A1c level that was 5.9. In the past she was a diabetic but could not control her blood glucose after going on a keto diet, plans to do that again now.  She continues to take Lyrica 150 mg at night.  In the past week she has also started to have low back pain that radiates down the side of her right leg.  The pain occasionally keeps her up at night.  Her PCP gave her Toradol shot mixed with steroids that helped.    Initial hiapi-17-uhz[age]-old female with a history of non-Hodgkin's lymphoma, hypothyroidism, hypertension previously seen by Dr. Iraheta for neuropathy who wants to establish care for her neuropathy.  As per patient she started having burning pain in her feet and subsequently her hands soon after getting chemotherapy and radiation for her non-Hodgkin's lymphoma in 2008.  She was started on Neurontin previously that caused bladder problems, Topamax that caused cognitive issues, Cymbalta that made her weak in her arms and legs, amitriptyline that she stopped herself as she was unable to get refills and Lyrica that she is currently on.  She has tried to stop the Lyrica several times but is unable to function a few weeks after stopping it.  Currently on a dose of 150 mg at night.  About 3 months ago she also started having pain in the back of her head and neck that radiates up the side of her head into her ear as well as the middle and lower portion of her face as well as the insides of her mouth  including the teeth.  The pain is so severe at times that she is unable to touch her face or chewing food.  Denies any vision problems.  She has had about 3-4 such episodes.  Denies having any severe headaches.  Patient states that she was a diabetic in the past but was taken off metformin after she lost weight through the keto diet.  Her last A1c was normal.    Of note-I reviewed Dr. Iraheta's notes.    The following portions of the patient's history were reviewed today and updated as of 06/29/2020  : allergies, current medications, past family history, past medical history, past social history, past surgical history and problem list  These document will be scanned to patient's chart.      Current Outpatient Medications:   •  carvedilol (COREG) 25 MG tablet, Take 1 tablet by mouth 2 (Two) Times a Day., Disp: 60 tablet, Rfl: 11  •  FLUoxetine (PROzac) 20 MG capsule, Take 1 capsule by mouth Daily., Disp: , Rfl:   •  furosemide (LASIX) 40 MG tablet, Take 1 tablet by mouth Daily., Disp: 30 tablet, Rfl: 11  •  HYDROcodone-acetaminophen (NORCO) 7.5-325 MG per tablet, Take 1 tablet by mouth 3 (Three) Times a Day., Disp: , Rfl:   •  ibuprofen (ADVIL,MOTRIN) 800 MG tablet, , Disp: , Rfl:   •  indomethacin (INDOCIN) 50 MG capsule, Take 1 capsule by mouth As Needed for Mild Pain  or Moderate Pain ., Disp: 30 capsule, Rfl: 1  •  levothyroxine (SYNTHROID, LEVOTHROID) 25 MCG tablet, Take 25 mcg by mouth Daily., Disp: , Rfl:   •  omeprazole (priLOSEC) 20 MG capsule, Take 20 mg by mouth Daily., Disp: , Rfl:   •  pregabalin (LYRICA) 150 MG capsule, Take 150 mg by mouth Every Night., Disp: , Rfl:   •  promethazine (PHENERGAN) 25 MG tablet, Take 25 mg by mouth As Needed., Disp: , Rfl:   •  SUMAtriptan (IMITREX) 50 MG tablet, 50 mg 1 (One) Time As Needed for Migraine. Take as directed , Disp: , Rfl:   •  tiZANidine (ZANAFLEX) 4 MG tablet, Take 8 mg by mouth Every Night., Disp: , Rfl:   •  vitamin D (ERGOCALCIFEROL) 74966 UNITS capsule  capsule, Take 50,000 Units by mouth Every 7 (Seven) Days. Tuesday, Disp: , Rfl:    Past Medical History:   Diagnosis Date   • Abnormal uterine bleeding (AUB)    • Arthritis    • Chest biopsy    • Depression    • Diabetes mellitus (CMS/HCC)    • Difficulty walking 2008    Due to neuropathy, sometimes I can’t put no weight on my fee   • Disease of thyroid gland    • H/O exploratory laparotomy    • H/O tubal ligation    • Heart BX , benign    • Heart disease    • History of bone marrow biopsy    • History of chemotherapy    • History of radiation therapy    • HTN (hypertension)    • Migraine    • Neuropathy     CAUSED BY RADIATIONA ND CHEMO PER PATIENT   • Non Hodgkin's lymphoma (CMS/HCC)     very close to heart, in remission, last radiation 2/08, last chemo 12/07   • Pain     LOWER LEFT QUAD   • Pericardial effusion     s/p natty window- removed 398 cc    • Peripheral neuropathy 2008   • Pulmonary HTN (CMS/HCC)    • Tachycardia       Past Surgical History:   Procedure Laterality Date   • CARDIAC BIOPSY     • CARDIAC CATHETERIZATION  05/07/2009    normal but small coronaries, EF 45-50%   • CARDIAC CATHETERIZATION  08/04/2015    essentially normal coronaries. EF 50%   • CARDIOVASCULAR STRESS TEST  01/11/2008    AKimber Cardiolyte- (Dr. Jay). EF 45% at rest, 53% with stress   • CARDIOVASCULAR STRESS TEST  02/10/2009    3 min, 20 sec. 95% THR. bp- 180/98, EF 49%. Negative   • CARDIOVASCULAR STRESS TEST  07/2015    R. Stress- 4 min, 103% THR. 106/68, negative by EKG criteria   • CARDIOVASCULAR STRESS TEST  05/18/2017    4 min 39 sec, 97% THR, 157/76, no obvious ischemia, increase Coreg   • CHOLECYSTECTOMY N/A 3/8/2018    Procedure: CHOLECYSTECTOMY LAPAROSCOPIC;  Surgeon: Artie Oneill MD;  Location: [facility] OR;  Service:    • COLONOSCOPY N/A 12/22/2017    Procedure: COLONOSCOPY;  Surgeon: Artie Oneill MD;  Location:  [facility] OR;  Service:    • CONVERTED (HISTORICAL) HOLTER  05/23/2017    sinus ave 96 bpm, 1 PVC, 4 PACs   •  CONVERTED (HISTORICAL) HOLTER  08/28/2018    Avg 96, one 6 beat SVT   • ECHO - CONVERTED  02/10/2009    EF 50-55%   • ECHO - CONVERTED  07/20/2015    EF 40-45%, RVSP 43 mmHg mild MR   • ECHO - CONVERTED  05/18/2017    EF 50-55%, RVSP 37 mmHg, mild MR   • ECHO - CONVERTED  09/05/2018    TLS. EF 55%. RVSP- 33 mmHg.   • ECHO - CONVERTED  08/25/2020    TLS. EF 60%. Trace-Mild MR. RVSP- 40 mmHg.   • OTHER SURGICAL HISTORY  08/2015    CT abd/pelvis and chest- essentially unremarkable   • OTHER SURGICAL HISTORY  08/2015    HIDA scan- WNL   • PERICARDIAL WINDOW     • TOTAL LAPAROSCOPIC HYSTERECTOMY N/A 3/8/2018    Procedure: TOTAL LAPAROSCOPIC HYSTERECTOMY BILATERAL SALPINGECTOMY AND LEFT OOPHORECTOMY WITH RifinitiINCI SI ROBOT;  Surgeon: Bill Greenwood DO;  Location: [facility];  Service:       Family History   Problem Relation Age of Onset   • Heart disease Mother    • Hypertension Mother    • Heart failure Mother    • Hypertension Father    • Hyperlipidemia Father    • Heart disease Child    • Breast cancer Maternal Aunt    • Diabetes Maternal Aunt    • Colon cancer Maternal Uncle    • Leukemia Paternal Uncle    • Hodgkin's lymphoma Paternal Uncle    • Lung cancer Paternal Grandfather    • Leukemia Maternal Aunt       Social History     Socioeconomic History   • Marital status: Single     Spouse name: Not on file   • Number of children: Not on file   • Years of education: Not on file   • Highest education level: Not on file   Tobacco Use   • Smoking status: Never Smoker   • Smokeless tobacco: Never Used   Substance and Sexual Activity   • Alcohol use: No   • Drug use: No   • Sexual activity: Yes     Partners: Male     Birth control/protection: Surgical     Review of Systems   Constitutional: Negative.    HENT: Negative.    Eyes: Negative.    Respiratory: Negative.    Gastrointestinal: Negative.    Endocrine: Negative.    Genitourinary: Negative.    Musculoskeletal: Positive for back pain.   Skin: Negative.   "  Allergic/Immunologic: Negative.    Neurological: Negative.    Hematological: Negative.    Psychiatric/Behavioral: Negative.    All other systems reviewed and are negative.      Objective:    /80   Pulse 82   Temp 98.6 °F (37 °C) (Temporal)   Resp 16   Ht 162 cm (63.78\")   Wt 106 kg (234 lb)   LMP 03/05/2018   SpO2 100%   Breastfeeding No   BMI 40.44 kg/m²     Neurology Exam:    General apperance: Obese    Mental status: Alert, awake and oriented to time place and person.    Recent and Remote memory: Intact.    Attention span and Concentration: Normal.     Language and Speech: Intact- No dysarthria.    Fluency, Naming , Repitition and Comprehension:  Intact    Cranial Nerves:   CN II: Visual fields are full. Intact. Fundi - Normal, No papillederma, Pupils - AMADOU  CN III, IV and VI: Extraocular movements are intact. Normal saccades.   CN V: Facial sensation is intact.   CN VII: Muscles of facial expression reveal no asymmetry. Intact.   CN VIII: Hearing is intact. Whispered voice intact.   CN IX and X: Palate elevates symmetrically. Intact  CN XI: Shoulder shrug is intact.   CN XII: Tongue is midline without evidence of atrophy or fasciculation.     Ophthalmoscopic exam of optic disc-normal    Motor:  Right UE muscle strength 5/5. Normal tone.     Left UE muscle strength 5/5. Normal tone.      Right LE muscle strength5/5. Normal tone.     Left LE muscle strength 5/5. Normal tone.      Sensory: Mildly reduced to pinprick in both feet till above ankles as well as slightly reduced vibration in lower extremities.  No tenderness to palpation of her face at her last visit    DTRs: 2+ bilaterally in upper and lower extremities.    Babinski: Negative bilaterally.    Co-ordination: Normal finger-to-nose, heel to shin B/L.    Rhomberg: Negative.    Gait: Normal.    Cardiovascular: Regular rate and rhythm without murmur, gallop or rub.    Assessment and Plan:  1. Polyneuropathy  Most likely due to her radiation " and chemotherapy as well as her prediabetes   She should continue Lyrica 150 mg at night  I have also told her to carry on dietary modifications to get her A1c under control    2. Trigeminal autonomic cephalgias  Has not had any more episodes.  She can take indomethacin 50 mg as needed    3.  Sciatica  I will give her a PT referral    Return in about 4 months (around 12/31/2020).       Melva David MD    Answers for HPI/ROS submitted by the patient on 6/28/2020   What is the primary reason for your visit?: Other  Please describe your symptoms.: Neuropathy on my left side has moved up the left side of my face now.  Have you had these symptoms before?: No  How long have you been having these symptoms?: Greater than 2 weeks  Please list any medications you are currently taking for this condition.: Lyrica  Please describe any probable cause for these symptoms. : The chemo and radiation treatments caused the neuropathy. I’ve had neuropathy since 2008 and it has just been getting worse.

## 2020-12-31 ENCOUNTER — TELEMEDICINE (OUTPATIENT)
Dept: NEUROLOGY | Facility: CLINIC | Age: 40
End: 2020-12-31

## 2020-12-31 DIAGNOSIS — G62.9 POLYNEUROPATHY: Primary | ICD-10-CM

## 2020-12-31 DIAGNOSIS — G44.099 TRIGEMINAL AUTONOMIC CEPHALGIAS: ICD-10-CM

## 2020-12-31 DIAGNOSIS — M54.31 SCIATICA OF RIGHT SIDE: ICD-10-CM

## 2020-12-31 PROCEDURE — 99214 OFFICE O/P EST MOD 30 MIN: CPT | Performed by: PSYCHIATRY & NEUROLOGY

## 2020-12-31 NOTE — PROGRESS NOTES
Subjective:    CC: Renate Mitchell is seen today via video visit for neuropathy and radiculopathy    HPI:  Current visit-patient states she has not had any more headaches radiating to her face since her last visit.  She does however continue to have low back pain that occasionally radiates down her right leg especially while carrying out tasks such as mopping the floor or doing the dishes.  Her pain is so severe that it disrupts his sleep at times.  She has been going for physical therapy but that has not helped much.  Continues to take Lyrica 150 mg nightly for her neuropathy but has also been having worsening burning in her hands at night.  They also swell up despite being started on Lasix.      Last visit-since the last visit patient states that she has not had any more episodes of her occipital headaches that radiate to her face.  In fact she has not had to take indomethacin.  Her MRI of the brain that I personally reviewed did not show any acute intracranial abnormalities or severe chronic ischemic changes.  With regards to her neuropathy she had blood work including a thyroid function test that was normal, B12 of 499 and an A1c level that was 5.9. In the past she was a diabetic but could not control her blood glucose after going on a keto diet, plans to do that again now.  She continues to take Lyrica 150 mg at night.  In the past week she has also started to have low back pain that radiates down the side of her right leg.  The pain occasionally keeps her up at night.  Her PCP gave her Toradol shot mixed with steroids that helped.    Initial [id number]-old female with a history of non-Hodgkin's lymphoma, hypothyroidism, hypertension previously seen by Dr. Iraheta for neuropathy who wants to establish care for her neuropathy.  As per patient she started having burning pain in her feet and subsequently her hands soon after getting chemotherapy and radiation for her non-Hodgkin's lymphoma in 2008.  She was  started on Neurontin previously that caused bladder problems, Topamax that caused cognitive issues, Cymbalta that made her weak in her arms and legs, amitriptyline that she stopped herself as she was unable to get refills and Lyrica that she is currently on.  She has tried to stop the Lyrica several times but is unable to function a few weeks after stopping it.  Currently on a dose of 150 mg at night.  About 3 months ago she also started having pain in the back of her head and neck that radiates up the side of her head into her ear as well as the middle and lower portion of her face as well as the insides of her mouth including the teeth.  The pain is so severe at times that she is unable to touch her face or chewing food.  Denies any vision problems.  She has had about 3-4 such episodes.  Denies having any severe headaches.  Patient states that she was a diabetic in the past but was taken off metformin after she lost weight through the keto diet.  Her last A1c was normal.    Of note-I reviewed Dr. Iraheta's notes.    The following portions of the patient's history were reviewed today and updated as of 06/29/2020  : allergies, current medications, past family history, past medical history, past social history, past surgical history and problem list  These document will be scanned to patient's chart.      Current Outpatient Medications:   •  carvedilol (COREG) 25 MG tablet, Take 1 tablet by mouth 2 (Two) Times a Day., Disp: 60 tablet, Rfl: 11  •  FLUoxetine (PROzac) 20 MG capsule, Take 1 capsule by mouth Daily., Disp: , Rfl:   •  furosemide (LASIX) 40 MG tablet, Take 1 tablet by mouth Daily., Disp: 30 tablet, Rfl: 11  •  HYDROcodone-acetaminophen (NORCO) 7.5-325 MG per tablet, Take 1 tablet by mouth 3 (Three) Times a Day., Disp: , Rfl:   •  ibuprofen (ADVIL,MOTRIN) 800 MG tablet, , Disp: , Rfl:   •  indomethacin (INDOCIN) 50 MG capsule, Take 1 capsule by mouth As Needed for Mild Pain  or Moderate Pain ., Disp: 30  capsule, Rfl: 1  •  levothyroxine (SYNTHROID, LEVOTHROID) 25 MCG tablet, Take 25 mcg by mouth Daily., Disp: , Rfl:   •  omeprazole (priLOSEC) 20 MG capsule, Take 20 mg by mouth Daily., Disp: , Rfl:   •  pregabalin (LYRICA) 150 MG capsule, Take 150 mg by mouth Every Night., Disp: , Rfl:   •  promethazine (PHENERGAN) 25 MG tablet, Take 25 mg by mouth As Needed., Disp: , Rfl:   •  SUMAtriptan (IMITREX) 50 MG tablet, 50 mg 1 (One) Time As Needed for Migraine. Take as directed , Disp: , Rfl:   •  tiZANidine (ZANAFLEX) 4 MG tablet, Take 8 mg by mouth Every Night., Disp: , Rfl:   •  vitamin D (ERGOCALCIFEROL) 37743 UNITS capsule capsule, Take 50,000 Units by mouth Every 7 (Seven) Days. Tuesday, Disp: , Rfl:    Past Medical History:   Diagnosis Date   • Abnormal uterine bleeding (AUB)    • Arthritis    • Chest biopsy    • Depression    • Diabetes mellitus (CMS/HCC)    • Difficulty walking 2008    Due to neuropathy, sometimes I can’t put no weight on my fee   • Disease of thyroid gland    • H/O exploratory laparotomy    • H/O tubal ligation    • Heart BX , benign    • Heart disease    • History of bone marrow biopsy    • History of chemotherapy    • History of radiation therapy    • HTN (hypertension)    • Migraine    • Neuropathy     CAUSED BY RADIATIONA ND CHEMO PER PATIENT   • Non Hodgkin's lymphoma (CMS/HCC)     very close to heart, in remission, last radiation 2/08, last chemo 12/07   • Pain     LOWER LEFT QUAD   • Pericardial effusion     s/p natty window- removed 398 cc    • Peripheral neuropathy 2008   • Pulmonary HTN (CMS/HCC)    • Tachycardia       Past Surgical History:   Procedure Laterality Date   • CARDIAC BIOPSY     • CARDIAC CATHETERIZATION  05/07/2009    normal but small coronaries, EF 45-50%   • CARDIAC CATHETERIZATION  08/04/2015    essentially normal coronaries. EF 50%   • CARDIOVASCULAR STRESS TEST  01/11/2008    CESAR Cardiolyte- (Dr. Jay). EF 45% at rest, 53% with stress   • CARDIOVASCULAR STRESS TEST   02/10/2009    3 min, 20 sec. 95% THR. bp- 180/98, EF 49%. Negative   • CARDIOVASCULAR STRESS TEST  07/2015    R. Stress- 4 min, 103% THR. 106/68, negative by EKG criteria   • CARDIOVASCULAR STRESS TEST  05/18/2017    4 min 39 sec, 97% THR, 157/76, no obvious ischemia, increase Coreg   • CHOLECYSTECTOMY N/A 3/8/2018    Procedure: CHOLECYSTECTOMY LAPAROSCOPIC;  Surgeon: Artie Oneill MD;  Location: [facility] OR;  Service:    • COLONOSCOPY N/A 12/22/2017    Procedure: COLONOSCOPY;  Surgeon: Artie Oneill MD;  Location: [facility] OR;  Service:    • CONVERTED (HISTORICAL) HOLTER  05/23/2017    sinus ave 96 bpm, 1 PVC, 4 PACs   • CONVERTED (HISTORICAL) HOLTER  08/28/2018    Avg 96, one 6 beat SVT   • ECHO - CONVERTED  02/10/2009    EF 50-55%   • ECHO - CONVERTED  07/20/2015    EF 40-45%, RVSP 43 mmHg mild MR   • ECHO - CONVERTED  05/18/2017    EF 50-55%, RVSP 37 mmHg, mild MR   • ECHO - CONVERTED  09/05/2018    TLS. EF 55%. RVSP- 33 mmHg.   • ECHO - CONVERTED  08/25/2020    TLS. EF 60%. Trace-Mild MR. RVSP- 40 mmHg.   • OTHER SURGICAL HISTORY  08/2015    CT abd/pelvis and chest- essentially unremarkable   • OTHER SURGICAL HISTORY  08/2015    HIDA scan- WNL   • PERICARDIAL WINDOW     • TOTAL LAPAROSCOPIC HYSTERECTOMY N/A 3/8/2018    Procedure: TOTAL LAPAROSCOPIC HYSTERECTOMY BILATERAL SALPINGECTOMY AND LEFT OOPHORECTOMY WITH DAVINCI SI ROBOT;  Surgeon: Bill Greenwood DO;  Location: [facility];  Service:       Family History   Problem Relation Age of Onset   • Heart disease Mother    • Hypertension Mother    • Heart failure Mother    • Hypertension Father    • Hyperlipidemia Father    • Heart disease Child    • Breast cancer Maternal Aunt    • Diabetes Maternal Aunt    • Colon cancer Maternal Uncle    • Leukemia Paternal Uncle    • Hodgkin's lymphoma Paternal Uncle    • Lung cancer Paternal Grandfather    • Leukemia Maternal Aunt       Social History     Socioeconomic History   • Marital status: Single     Spouse name:  Not on file   • Number of children: Not on file   • Years of education: Not on file   • Highest education level: Not on file   Tobacco Use   • Smoking status: Never Smoker   • Smokeless tobacco: Never Used   Substance and Sexual Activity   • Alcohol use: No   • Drug use: No   • Sexual activity: Yes     Partners: Male     Birth control/protection: Surgical     Review of Systems   Constitutional: Negative.    HENT: Negative.    Eyes: Negative.    Respiratory: Negative.    Cardiovascular: Positive for leg swelling.   Gastrointestinal: Negative.    Endocrine: Negative.    Genitourinary: Negative.    Musculoskeletal: Positive for back pain.   Skin: Negative.    Allergic/Immunologic: Negative.    Neurological: Negative.    Hematological: Negative.    Psychiatric/Behavioral: Negative.    All other systems reviewed and are negative.      Objective:    LMP 03/05/2018     Neurology Exam:    General apperance: Obese    Mental status: Alert, awake and oriented to time place and person.    Recent and Remote memory: Intact.    Attention span and Concentration: Normal.     Language and Speech: Intact- No dysarthria.    Fluency, Naming , Repitition and Comprehension:  Intact    Cranial Nerves:   CN II: Visual fields are full. Intact. Fundi - Normal, No papillederma, Pupils - AMADOU  CN III, IV and VI: Extraocular movements are intact. Normal saccades.   CN V: Facial sensation is intact.   CN VII: Muscles of facial expression reveal no asymmetry. Intact.   CN VIII: Hearing is intact. Whispered voice intact.   CN IX and X: Palate elevates symmetrically. Intact  CN XI: Shoulder shrug is intact.   CN XII: Tongue is midline without evidence of atrophy or fasciculation.       Motor:  Intact    Sensory: At last visit-mildly reduced to pinprick in both feet till above ankles as well as slightly reduced vibration in lower extremities.  No tenderness to palpation of her face at her last visit    Gait: Normal.    Assessment and Plan:  1.  Polyneuropathy  Most likely due to her radiation and chemotherapy as well as her prediabetes   -Currently on Lyrica 150 mg nightly that was prescribed by her PCP.  I have told her to speak to her PCP about increasing the dose slightly and adding 50 mg in the afternoon as she continues to have paresthesias in her hands  I have once again told her to carry on dietary modifications to get her A1c under control    2. Trigeminal autonomic cephalgias  Has not had any more episodes.  She can take indomethacin 50 mg as needed    3.  Sciatica  She tried physical therapy but that has not helped much.  Hence I will get a MRI of the lumbar spine to rule out any major spinal canal/neural foraminal stenosis  -I will defer giving her a Medrol Dosepak as that has made her extremely sick in the past    Return in about 6 weeks (around 2/11/2021).       Melva David MD    Answers for HPI/ROS submitted by the patient on 6/28/2020   What is the primary reason for your visit?: Other  Please describe your symptoms.: Neuropathy on my left side has moved up the left side of my face now.  Have you had these symptoms before?: No  How long have you been having these symptoms?: Greater than 2 weeks  Please list any medications you are currently taking for this condition.: Lyrica  Please describe any probable cause for these symptoms. : The chemo and radiation treatments caused the neuropathy. I’ve had neuropathy since 2008 and it has just been getting worse.

## 2021-02-11 ENCOUNTER — HOSPITAL ENCOUNTER (OUTPATIENT)
Dept: MRI IMAGING | Facility: HOSPITAL | Age: 41
Discharge: HOME | End: 2021-02-11
Admitting: PSYCHIATRY & NEUROLOGY

## 2021-02-11 DIAGNOSIS — M54.31 SCIATICA OF RIGHT SIDE: ICD-10-CM

## 2021-02-11 PROCEDURE — 72148 MRI LUMBAR SPINE W/O DYE: CPT

## 2021-02-12 ENCOUNTER — TELEPHONE (OUTPATIENT)
Dept: NEUROLOGY | Facility: CLINIC | Age: 41
End: 2021-02-12

## 2021-02-12 NOTE — TELEPHONE ENCOUNTER
----- Message from Melva David MD sent at 2/12/2021 10:39 AM EST -----  Please tell the patient that the MRI of the lumbar spine shows minor arthritis-like changes but nothing significant.  Since she continues to have pain despite the Lyrica and getting physical therapy would she like a referral for pain management so that they can give her an epidural block?  Let me know

## 2021-02-23 ENCOUNTER — OFFICE VISIT (OUTPATIENT)
Dept: CARDIOLOGY | Facility: CLINIC | Age: 41
End: 2021-02-23

## 2021-02-23 VITALS
HEIGHT: 64 IN | HEART RATE: 72 BPM | TEMPERATURE: 97.7 F | WEIGHT: 236 LBS | SYSTOLIC BLOOD PRESSURE: 130 MMHG | DIASTOLIC BLOOD PRESSURE: 70 MMHG | BODY MASS INDEX: 40.29 KG/M2

## 2021-02-23 DIAGNOSIS — I27.20 PULMONARY HTN (HCC): ICD-10-CM

## 2021-02-23 DIAGNOSIS — C85.90 NON-HODGKIN'S LYMPHOMA, UNSPECIFIED BODY REGION, UNSPECIFIED NON-HODGKIN LYMPHOMA TYPE (HCC): ICD-10-CM

## 2021-02-23 DIAGNOSIS — I10 ESSENTIAL HYPERTENSION: ICD-10-CM

## 2021-02-23 DIAGNOSIS — E66.01 CLASS 3 SEVERE OBESITY DUE TO EXCESS CALORIES WITHOUT SERIOUS COMORBIDITY WITH BODY MASS INDEX (BMI) OF 40.0 TO 44.9 IN ADULT (HCC): ICD-10-CM

## 2021-02-23 DIAGNOSIS — R06.09 DYSPNEA ON EXERTION: Primary | ICD-10-CM

## 2021-02-23 DIAGNOSIS — R00.0 TACHYCARDIA: ICD-10-CM

## 2021-02-23 DIAGNOSIS — R60.1 GENERALIZED EDEMA: ICD-10-CM

## 2021-02-23 DIAGNOSIS — R00.2 PALPITATION: ICD-10-CM

## 2021-02-23 PROBLEM — E66.812 CLASS 2 SEVERE OBESITY DUE TO EXCESS CALORIES WITH SERIOUS COMORBIDITY AND BODY MASS INDEX (BMI) OF 37.0 TO 37.9 IN ADULT: Status: RESOLVED | Noted: 2019-05-07 | Resolved: 2021-02-23

## 2021-02-23 PROBLEM — I51.9 LV DYSFUNCTION: Status: RESOLVED | Noted: 2019-02-26 | Resolved: 2021-02-23

## 2021-02-23 PROCEDURE — 99214 OFFICE O/P EST MOD 30 MIN: CPT | Performed by: NURSE PRACTITIONER

## 2021-02-23 RX ORDER — METOLAZONE 2.5 MG/1
TABLET ORAL
Qty: 30 TABLET | Refills: 11 | Status: SHIPPED | OUTPATIENT
Start: 2021-02-23 | End: 2022-02-03 | Stop reason: SDUPTHER

## 2021-02-23 RX ORDER — FUROSEMIDE 40 MG/1
40 TABLET ORAL DAILY
Qty: 30 TABLET | Refills: 11 | Status: SHIPPED | OUTPATIENT
Start: 2021-02-23 | End: 2022-02-03 | Stop reason: SDUPTHER

## 2021-02-23 RX ORDER — CARVEDILOL 25 MG/1
25 TABLET ORAL 2 TIMES DAILY
Qty: 60 TABLET | Refills: 11 | Status: SHIPPED | OUTPATIENT
Start: 2021-02-23 | End: 2022-03-16 | Stop reason: SDUPTHER

## 2021-02-23 RX ORDER — TRIAMTERENE AND HYDROCHLOROTHIAZIDE 37.5; 25 MG/1; MG/1
1 TABLET ORAL DAILY
COMMUNITY
End: 2022-02-03

## 2021-02-23 NOTE — PROGRESS NOTES
"Chief Complaint   Patient presents with   • Follow-up     Cardiac management   • Labs     Had labs per PCP   • Shortness of Breath     Reports SOA with very minimal exertion .   • Edema     Has edema to bilat lower legs . She has been to PCP numerous times recently with edema, he has given her Lasix IM with relief but swelling comes back   • Med Refill     Needs refills on Coreg and Lasix 30 day supply to [facility]       Subjective       Renateanant Mitchell is a 40 y.o. female  with a history of palpitations, chest pain, shortness of breath, and non-Hodgkin's lymphoma for which she has undergone chemo and radiation in the past with the last in 2008. She was referred to [facility] for worsening shortness of breath. In May of 2015 she was seen for pericarditis vs worsening lymphoma. Workup showed essentially normal coronaries and mild LV dysfunction. In 2017, she had more edema and CP. Stress showed no ischemia but hypertensive and HR response.  Echo showed EF improved to 55%. Holter showed baseline sinus with average rate 96 bpm, no arrhythmia. Coreg increased. In October 2017, an overnight oxygen was normal. Sleep study done 9/10/18 was normal. She reported more shortness of breath and tachycardia, Coreg increased to 25 mg BID. Holter and echo showed similar findings of normal EF, HR upper limit of normal. No changes.     Today she comes to the office for a follow up visit.  She is concerned over increased shortness of breath with very minimal exertion.  She has more swelling in her lower legs as well as generalized edema.  She admits to taking Lasix and Maxide routinely but develops swelling worse some days despite medication management.  According to patient edema has been treated with \"shot of Lasix\" several times in the recent past before symptom improved.  Recent labs were done but I do not have a copy of results.    Cardiac History:    Past Surgical History:   Procedure Laterality Date   • CARDIAC BIOPSY  "    • CARDIAC CATHETERIZATION  05/07/2009    normal but small coronaries, EF 45-50%   • CARDIAC CATHETERIZATION  08/04/2015    essentially normal coronaries. EF 50%   • CARDIOVASCULAR STRESS TEST  01/11/2008    CESAR Cardiolyte- (Dr. Jay). EF 45% at rest, 53% with stress   • CARDIOVASCULAR STRESS TEST  02/10/2009    3 min, 20 sec. 95% THR. bp- 180/98, EF 49%. Negative   • CARDIOVASCULAR STRESS TEST  07/2015    R. Stress- 4 min, 103% THR. 106/68, negative by EKG criteria   • CARDIOVASCULAR STRESS TEST  05/18/2017    4 min 39 sec, 97% THR, 157/76, no obvious ischemia, increase Coreg   • CHOLECYSTECTOMY N/A 3/8/2018    Procedure: CHOLECYSTECTOMY LAPAROSCOPIC;  Surgeon: Artie Oneill MD;  Location: [facility] OR;  Service:    • COLONOSCOPY N/A 12/22/2017    Procedure: COLONOSCOPY;  Surgeon: Artie Oneill MD;  Location: [facility] OR;  Service:    • CONVERTED (HISTORICAL) HOLTER  05/23/2017    sinus ave 96 bpm, 1 PVC, 4 PACs   • CONVERTED (HISTORICAL) HOLTER  08/28/2018    Avg 96, one 6 beat SVT   • ECHO - CONVERTED  02/10/2009    EF 50-55%   • ECHO - CONVERTED  07/20/2015    EF 40-45%, RVSP 43 mmHg mild MR   • ECHO - CONVERTED  05/18/2017    EF 50-55%, RVSP 37 mmHg, mild MR   • ECHO - CONVERTED  09/05/2018    TLS. EF 55%. RVSP- 33 mmHg.   • ECHO - CONVERTED  08/25/2020    TLS. EF 60%. Trace-Mild MR. RVSP- 40 mmHg.   • OTHER SURGICAL HISTORY  08/2015    CT abd/pelvis and chest- essentially unremarkable   • OTHER SURGICAL HISTORY  08/2015    HIDA scan- WNL   • PERICARDIAL WINDOW     • TOTAL LAPAROSCOPIC HYSTERECTOMY N/A 3/8/2018    Procedure: TOTAL LAPAROSCOPIC HYSTERECTOMY BILATERAL SALPINGECTOMY AND LEFT OOPHORECTOMY WITH DAVINCI SI ROBOT;  Surgeon: Bill Greenwood DO;  Location:  [facility] OR;  Service:        Current Outpatient Medications   Medication Sig Dispense Refill   • carvedilol (COREG) 25 MG tablet Take 1 tablet by mouth 2 (Two) Times a Day. 60 tablet 11   • FLUoxetine (PROzac) 20 MG capsule Take 1 capsule by  mouth Daily.     • furosemide (LASIX) 40 MG tablet Take 1 tablet by mouth Daily. 30 tablet 11   • HYDROcodone-acetaminophen (NORCO) 7.5-325 MG per tablet Take 1 tablet by mouth 3 (Three) Times a Day.     • ibuprofen (ADVIL,MOTRIN) 800 MG tablet      • indomethacin (INDOCIN) 50 MG capsule Take 1 capsule by mouth As Needed for Mild Pain  or Moderate Pain . 30 capsule 1   • levothyroxine (SYNTHROID, LEVOTHROID) 25 MCG tablet Take 25 mcg by mouth Daily.     • omeprazole (priLOSEC) 20 MG capsule Take 20 mg by mouth Daily.     • pregabalin (LYRICA) 150 MG capsule Take 150 mg by mouth Every Night.     • promethazine (PHENERGAN) 25 MG tablet Take 25 mg by mouth As Needed.     • SUMAtriptan (IMITREX) 50 MG tablet 50 mg 1 (One) Time As Needed for Migraine. Take as directed      • tiZANidine (ZANAFLEX) 4 MG tablet Take 8 mg by mouth Every Night.     • triamterene-hydrochlorothiazide (MAXZIDE-25) 37.5-25 MG per tablet Take 1 tablet by mouth Daily.     • metOLazone (ZAROXOLYN) 2.5 MG tablet Take 30 minutes to one hour prior to lasix as needed for swelling 30 tablet 11   • vitamin D (ERGOCALCIFEROL) 67032 UNITS capsule capsule Take 50,000 Units by mouth Every 7 (Seven) Days. Tuesday       No current facility-administered medications for this visit.        Codeine, Cymbalta [duloxetine hcl], Neurontin [gabapentin], Prednisolone, Tramadol hcl, and Tyloxapol    Past Medical History:   Diagnosis Date   • Abnormal uterine bleeding (AUB)    • Arthritis    • Arthritis, lumbar spine    • Chest biopsy    • Depression    • Diabetes mellitus (CMS/HCC)    • Difficulty walking 2008    Due to neuropathy, sometimes I can’t put no weight on my fee   • Disease of thyroid gland    • H/O exploratory laparotomy    • H/O tubal ligation    • Heart BX , benign    • Heart disease    • History of bone marrow biopsy    • History of chemotherapy    • History of radiation therapy    • HTN (hypertension)    • Migraine    • Neuropathy     CAUSED BY RADIATIONA  "ND CHEMO PER PATIENT   • Non Hodgkin's lymphoma (CMS/HCC)     very close to heart, in remission, last radiation 2/08, last chemo 12/07   • Pain     LOWER LEFT QUAD   • Pericardial effusion     s/p natty window- removed 398 cc    • Peripheral neuropathy 2008   • Pulmonary HTN (CMS/HCC)    • Tachycardia        Social History     Socioeconomic History   • Marital status: Single     Spouse name: Not on file   • Number of children: Not on file   • Years of education: Not on file   • Highest education level: Not on file   Tobacco Use   • Smoking status: Never Smoker   • Smokeless tobacco: Never Used   Substance and Sexual Activity   • Alcohol use: No   • Drug use: No   • Sexual activity: Yes     Partners: Male     Birth control/protection: Surgical       Family History   Problem Relation Age of Onset   • Heart disease Mother    • Hypertension Mother    • Heart failure Mother    • Hypertension Father    • Hyperlipidemia Father    • Heart disease Child    • Breast cancer Maternal Aunt    • Diabetes Maternal Aunt    • Colon cancer Maternal Uncle    • Leukemia Paternal Uncle    • Hodgkin's lymphoma Paternal Uncle    • Lung cancer Paternal Grandfather    • Leukemia Maternal Aunt        ROS     BP Readings from Last 5 Encounters:   02/23/21 130/70   08/31/20 128/80   08/17/20 140/82   06/29/20 108/82   06/05/19 125/80       Wt Readings from Last 5 Encounters:   02/23/21 107 kg (236 lb)   08/31/20 106 kg (234 lb)   08/25/20 103 kg (227 lb 1.2 oz)   08/17/20 103 kg (226 lb 12.8 oz)   06/29/20 109 kg (241 lb)       Objective     /70 (BP Location: Right arm)   Pulse 72   Temp 97.7 °F (36.5 °C)   Ht 162 cm (63.78\")   Wt 107 kg (236 lb)   LMP 03/05/2018   BMI 40.79 kg/m²     Vitals signs and nursing note reviewed.   Eyes:      Pupils: Pupils are equal, round, and reactive to light.   HENT:      Head: Normocephalic.   Neck:      Musculoskeletal: Normal range of motion and neck supple. No muscular tenderness.      Thyroid: " No thyroid tenderness.      Vascular: No JVD.      Lymphadenopathy: Cervical adenopathy present.   Lymphadenopathy:        Cervical:      Left cervical: Superficial cervical adenopathy (slightly prominent) present.   Pulmonary:      Effort: Pulmonary effort is normal.      Breath sounds: Normal breath sounds.   Cardiovascular:      Normal rate. Regular rhythm. loud S2.   Pulses:     Intact distal pulses.   Edema:     Peripheral edema present.  Abdominal:      General: Bowel sounds are normal.      Palpations: Abdomen is soft.   Musculoskeletal: Normal range of motion.   Skin:     General: Skin is warm.   Neurological:      Mental Status: Alert and oriented to person, place, and time.          Procedures: none today          Assessment/Plan   Diagnoses and all orders for this visit:    1. Dyspnea on exertion (Primary)  -     metOLazone (ZAROXOLYN) 2.5 MG tablet; Take 30 minutes to one hour prior to lasix as needed for swelling  Dispense: 30 tablet; Refill: 11  -     Pulmonary Function Test; Future  -     CT Chest Without Contrast Diagnostic; Future    2. Pulmonary HTN (CMS/HCC)  -     Pulmonary Function Test; Future  -     CT Chest Without Contrast Diagnostic; Future  -     carvedilol (COREG) 25 MG tablet; Take 1 tablet by mouth 2 (Two) Times a Day.  Dispense: 60 tablet; Refill: 11  -     furosemide (LASIX) 40 MG tablet; Take 1 tablet by mouth Daily.  Dispense: 30 tablet; Refill: 11    3. Generalized edema  -     metOLazone (ZAROXOLYN) 2.5 MG tablet; Take 30 minutes to one hour prior to lasix as needed for swelling  Dispense: 30 tablet; Refill: 11  -     CT Chest Without Contrast Diagnostic; Future    4. Essential hypertension  -     carvedilol (COREG) 25 MG tablet; Take 1 tablet by mouth 2 (Two) Times a Day.  Dispense: 60 tablet; Refill: 11  -     furosemide (LASIX) 40 MG tablet; Take 1 tablet by mouth Daily.  Dispense: 30 tablet; Refill: 11    5. Class 3 severe obesity due to excess calories without serious  comorbidity with body mass index (BMI) of 40.0 to 44.9 in adult (CMS/HCC)    6. Palpitation  -     carvedilol (COREG) 25 MG tablet; Take 1 tablet by mouth 2 (Two) Times a Day.  Dispense: 60 tablet; Refill: 11    7. Tachycardia  -     carvedilol (COREG) 25 MG tablet; Take 1 tablet by mouth 2 (Two) Times a Day.  Dispense: 60 tablet; Refill: 11    8. Non-Hodgkin's lymphoma, unspecified body region, unspecified non-Hodgkin lymphoma type (CMS/HCC)  -     CT Chest Without Contrast Diagnostic; Future      Dyspnea/pulmonary hypertension-echocardiogram done last year showed RVSP increased from 33 mmHg to 40 mmHg.  Sleep study in the past was negative for JAM.  PFT and CT scan of chest ordered.  If results are normal then consider repeat echocardiogram.    Edema-patient admits to weight gain of 7 pounds in 1 day.  Advised to continue current dose Lasix and Maxide.  Prescription given for Zaroxolyn 2.5 mg to take 30 minutes prior to Lasix daily as needed.  Patient admits to taking daily dose of potassium.  I have requested a copy of recent lab results.  Consider repeat labs to monitor renal function and electrolytes.    Palpitations/tachycardia-heart rate and rhythm is normal today.  Patient admits to good control of palpitations with current medication management.  Refills given to continue same dose carvedilol being 25 mg twice daily.    Obesity-Patient's Body mass index is 40.79 kg/m². BMI is above normal parameters. Recommendations include: nutrition counseling.  Patient's weight is similar to last visit.  She admits to frustration not being able to lose weight.  She plans to retry keto/low carbohydrate diet which she found beneficial in the past.  Good hydration also encouraged.     History of non-Hodgkin's lymphoma-follows with Dr. Cho.     Further recommendations based on pulmonary work-up.  A 6-month follow-up visit scheduled.  Please call sooner if cardiac concerns.           Electronically signed by Cassi MARSH  Teran, APRN,  February 23, 2021 10:58 EST

## 2021-02-23 NOTE — PATIENT INSTRUCTIONS
"DASH Eating Plan  DASH stands for \"Dietary Approaches to Stop Hypertension.\" The DASH eating plan is a healthy eating plan that has been shown to reduce high blood pressure (hypertension). It may also reduce your risk for type 2 diabetes, heart disease, and stroke. The DASH eating plan may also help with weight loss.  What are tips for following this plan?    General guidelines  · Avoid eating more than 2,300 mg (milligrams) of salt (sodium) a day. If you have hypertension, you may need to reduce your sodium intake to 1,500 mg a day.  · Limit alcohol intake to no more than 1 drink a day for nonpregnant women and 2 drinks a day for men. One drink equals 12 oz of beer, 5 oz of wine, or 1½ oz of hard liquor.  · Work with your health care provider to maintain a healthy body weight or to lose weight. Ask what an ideal weight is for you.  · Get at least 30 minutes of exercise that causes your heart to beat faster (aerobic exercise) most days of the week. Activities may include walking, swimming, or biking.  · Work with your health care provider or diet and nutrition specialist (dietitian) to adjust your eating plan to your individual calorie needs.  Reading food labels    · Check food labels for the amount of sodium per serving. Choose foods with less than 5 percent of the Daily Value of sodium. Generally, foods with less than 300 mg of sodium per serving fit into this eating plan.  · To find whole grains, look for the word \"whole\" as the first word in the ingredient list.  Shopping  · Buy products labeled as \"low-sodium\" or \"no salt added.\"  · Buy fresh foods. Avoid canned foods and premade or frozen meals.  Cooking  · Avoid adding salt when cooking. Use salt-free seasonings or herbs instead of table salt or sea salt. Check with your health care provider or pharmacist before using salt substitutes.  · Do not reyes foods. Cook foods using healthy methods such as baking, boiling, grilling, and broiling instead.  · Cook with " heart-healthy oils, such as olive, canola, soybean, or sunflower oil.  Meal planning  · Eat a balanced diet that includes:  ? 5 or more servings of fruits and vegetables each day. At each meal, try to fill half of your plate with fruits and vegetables.  ? Up to 6-8 servings of whole grains each day.  ? Less than 6 oz of lean meat, poultry, or fish each day. A 3-oz serving of meat is about the same size as a deck of cards. One egg equals 1 oz.  ? 2 servings of low-fat dairy each day.  ? A serving of nuts, seeds, or beans 5 times each week.  ? Heart-healthy fats. Healthy fats called Omega-3 fatty acids are found in foods such as flaxseeds and coldwater fish, like sardines, salmon, and mackerel.  · Limit how much you eat of the following:  ? Canned or prepackaged foods.  ? Food that is high in trans fat, such as fried foods.  ? Food that is high in saturated fat, such as fatty meat.  ? Sweets, desserts, sugary drinks, and other foods with added sugar.  ? Full-fat dairy products.  · Do not salt foods before eating.  · Try to eat at least 2 vegetarian meals each week.  · Eat more home-cooked food and less restaurant, buffet, and fast food.  · When eating at a restaurant, ask that your food be prepared with less salt or no salt, if possible.  What foods are recommended?  The items listed may not be a complete list. Talk with your dietitian about what dietary choices are best for you.  Grains  Whole-grain or whole-wheat bread. Whole-grain or whole-wheat pasta. Brown rice. Oatmeal. Quinoa. Bulgur. Whole-grain and low-sodium cereals. Vidhi bread. Low-fat, low-sodium crackers. Whole-wheat flour tortillas.  Vegetables  Fresh or frozen vegetables (raw, steamed, roasted, or grilled). Low-sodium or reduced-sodium tomato and vegetable juice. Low-sodium or reduced-sodium tomato sauce and tomato paste. Low-sodium or reduced-sodium canned vegetables.  Fruits  All fresh, dried, or frozen fruit. Canned fruit in natural juice (without  added sugar).  Meat and other protein foods  Skinless chicken or turkey. Ground chicken or turkey. Pork with fat trimmed off. Fish and seafood. Egg whites. Dried beans, peas, or lentils. Unsalted nuts, nut butters, and seeds. Unsalted canned beans. Lean cuts of beef with fat trimmed off. Low-sodium, lean deli meat.  Dairy  Low-fat (1%) or fat-free (skim) milk. Fat-free, low-fat, or reduced-fat cheeses. Nonfat, low-sodium ricotta or cottage cheese. Low-fat or nonfat yogurt. Low-fat, low-sodium cheese.  Fats and oils  Soft margarine without trans fats. Vegetable oil. Low-fat, reduced-fat, or light mayonnaise and salad dressings (reduced-sodium). Canola, safflower, olive, soybean, and sunflower oils. Avocado.  Seasoning and other foods  Herbs. Spices. Seasoning mixes without salt. Unsalted popcorn and pretzels. Fat-free sweets.  What foods are not recommended?  The items listed may not be a complete list. Talk with your dietitian about what dietary choices are best for you.  Grains  Baked goods made with fat, such as croissants, muffins, or some breads. Dry pasta or rice meal packs.  Vegetables  Creamed or fried vegetables. Vegetables in a cheese sauce. Regular canned vegetables (not low-sodium or reduced-sodium). Regular canned tomato sauce and paste (not low-sodium or reduced-sodium). Regular tomato and vegetable juice (not low-sodium or reduced-sodium). Pickles. Olives.  Fruits  Canned fruit in a light or heavy syrup. Fried fruit. Fruit in cream or butter sauce.  Meat and other protein foods  Fatty cuts of meat. Ribs. Fried meat. Quiroga. Sausage. Bologna and other processed lunch meats. Salami. Fatback. Hotdogs. Bratwurst. Salted nuts and seeds. Canned beans with added salt. Canned or smoked fish. Whole eggs or egg yolks. Chicken or turkey with skin.  Dairy  Whole or 2% milk, cream, and half-and-half. Whole or full-fat cream cheese. Whole-fat or sweetened yogurt. Full-fat cheese. Nondairy creamers. Whipped toppings.  Processed cheese and cheese spreads.  Fats and oils  Butter. Stick margarine. Lard. Shortening. Ghee. Quiroga fat. Tropical oils, such as coconut, palm kernel, or palm oil.  Seasoning and other foods  Salted popcorn and pretzels. Onion salt, garlic salt, seasoned salt, table salt, and sea salt. Worcestershire sauce. Tartar sauce. Barbecue sauce. Teriyaki sauce. Soy sauce, including reduced-sodium. Steak sauce. Canned and packaged gravies. Fish sauce. Oyster sauce. Cocktail sauce. Horseradish that you find on the shelf. Ketchup. Mustard. Meat flavorings and tenderizers. Bouillon cubes. Hot sauce and Tabasco sauce. Premade or packaged marinades. Premade or packaged taco seasonings. Relishes. Regular salad dressings.  Where to find more information:  · National Heart, Lung, and [employer] [place]: www.nhlbi.nih.gov  · American Heart Association: www.heart.org  Summary  · The DASH eating plan is a healthy eating plan that has been shown to reduce high blood pressure (hypertension). It may also reduce your risk for type 2 diabetes, heart disease, and stroke.  · With the DASH eating plan, you should limit salt (sodium) intake to 2,300 mg a day. If you have hypertension, you may need to reduce your sodium intake to 1,500 mg a day.  · When on the DASH eating plan, aim to eat more fresh fruits and vegetables, whole grains, lean proteins, low-fat dairy, and heart-healthy fats.  · Work with your health care provider or diet and nutrition specialist (dietitian) to adjust your eating plan to your individual calorie needs.  This information is not intended to replace advice given to you by your health care provider. Make sure you discuss any questions you have with your health care provider.  Document Revised: 11/30/2018 Document Reviewed: 12/11/2017  Elsevier Patient Education © 2020 [employer]    Physical Activity With Heart Disease  Being active has many benefits, especially if you have heart disease. Physical activity can help  you do more and feel healthier. Start slowly, and increase the amount of time you spend being active. Most adults should aim for physical activity that:  · Makes you breathe harder and raises your heart rate (aerobic activity). Try to get at least 150 minutes of aerobic activity each week. This is about 30 minutes each day, 5 days a week.  · Helps build muscle strength (strengthening activity). Do this at least 2 times a week.  Always talk with your health care provider before starting any new activity program or if you have any changes in your condition.  What are the benefits of physical activity?  When you have heart disease, physical activity can help:  · Lower your blood pressure.  · Lower your cholesterol.  · Control your weight.  · Improve your sleep.  · Help control your blood sugar.  · Improve your heart and lung function.  · Reduce your risk for blood clots (thrombophlebitis).  · Improve your energy level.  · Reduce stress.  What are some types of physical activity I could try?  There are many ways to be active. Talk with your health care provider about what types and intensity of activity is right for you.  Aerobic activity    Aerobic (cardiovascular) activity can be moderate or vigorous intensity, depending on how hard you are working.  Moderate-intensity activity includes:  · Walking.  · Slow bicycling.  · Water aerobics.  · Dancing.  · Light gardening or house work.  Vigorous-intensity activity includes:  · Jogging or running.  · Stair climbing.  · Swimming laps.  · Hiking uphill.  · Heavy gardening, such as digging trenches.    Strengthening activity  Strengthening activities work your muscles to build strength. Some examples include:  · Doing push-ups, sit-ups, or pull-ups.  · Lifting small weights.  · Using resistance bands.    Flexibility  Flexibility activities lengthen your muscles to keep them flexible and less tight and improve your balance. Some examples include:  · Stretching.  · Yoga.  · Armando  chi.  · Ballet bladimir.    Follow these instructions at home:  How to get started  · Talk with your health care provider about:  ? What types of activities are safe for you.  ? If you should check your pulse or take other precautions during physical activity.  · Get a calendar. Write down a schedule and plan for your new routine.  · Take time to find out what works for you. Consider:  ? Joining a community program, such as a biking group, yoga class, local gym, or swimming pool membership.  ? Be active on your own by downloading free workout applications on a smartphone or other devices, or by purchasing workout DVDs.  · If you have not been active, begin with sessions that last 10-15 minutes. Gradually work up to sessions that last 20-30 minutes, 5 times a week. Follow all of your health care provider's recommendations.  · Be patient with yourself. It takes time to build up strength and lung capacity.  Safety  · Exercise in an indoor, climate-controlled facility, as told by your health care provider. You may need to do this if:  ? There are extreme outdoor conditions, such as heat, humidity, or cold.  ? There is an air pollution advisory. Your local news, board of health, or hospital can provide information on air quality.  · Take extra precautions as told by your health care provider. This may include:  ? Monitoring your heart rate.  ? Avoiding heavy lifting.  ? Understanding how your medicines can affect you during physical activity. Certain medicines may cause heat intolerance or changes in blood sugar.  ? Slowing down to rest when you need to.  ? Keeping nitroglycerin spray and tablets with you at all times if you have angina. Use them as told to prevent and treat symptoms.  · Drink plenty of water before, during, and after physical activity.  · Know what symptoms may be signs of a problem. Stop physical activity right away if you have any of these symptoms.  Get help right away if you have any of the following  during exercise:  · Chest pain, shortness of breath, or feel very tired.  · Pain in the arm, shoulder, neck, or jaw.  · Feel weak, dizzy, or light-headed.  · An irregular heart rate, or your heart rate is greater than 100 beats per minute (bpm) before exercise.  These symptoms may represent a serious problem that is an emergency. Do not wait to see if the symptoms go away. Get medical help right away. Call your local emergency services (911 in the [place] Do not drive yourself to the hospital.   Summary  · Physical activity has many benefits, especially if you have heart disease.  · Before starting an activity program, talk with your health care provider about how often to be active and what type of activity is safe for you.  · Your physical activity plan may include moderate or vigorous aerobic activity, strengthening activities, and flexibility.  · Know what symptoms may be signs of a problem. Stop physical activity right away and call emergency services (911 in the [place] if you have any of these symptoms.  This information is not intended to replace advice given to you by your health care provider. Make sure you discuss any questions you have with your health care provider.  Document Revised: 01/09/2019 Document Reviewed: 01/09/2019  Elsevier Patient Education © 2020 [employer] Inc.

## 2021-03-09 DIAGNOSIS — I51.9 LV DYSFUNCTION: ICD-10-CM

## 2021-03-09 DIAGNOSIS — C85.90 NON-HODGKIN'S LYMPHOMA, UNSPECIFIED BODY REGION, UNSPECIFIED NON-HODGKIN LYMPHOMA TYPE (HCC): ICD-10-CM

## 2021-03-09 DIAGNOSIS — R06.09 DYSPNEA ON EXERTION: Primary | ICD-10-CM

## 2021-03-09 DIAGNOSIS — I27.20 PULMONARY HTN (HCC): ICD-10-CM

## 2021-03-23 ENCOUNTER — BULK ORDERING (OUTPATIENT)
Dept: CASE MANAGEMENT | Facility: OTHER | Age: 41
End: 2021-03-23

## 2021-03-23 DIAGNOSIS — Z23 IMMUNIZATION DUE: ICD-10-CM

## 2021-03-29 ENCOUNTER — HOSPITAL ENCOUNTER (OUTPATIENT)
Dept: CARDIOLOGY | Facility: HOSPITAL | Age: 41
Discharge: HOME | End: 2021-03-29
Admitting: NURSE PRACTITIONER

## 2021-03-29 DIAGNOSIS — C85.90 NON-HODGKIN'S LYMPHOMA, UNSPECIFIED BODY REGION, UNSPECIFIED NON-HODGKIN LYMPHOMA TYPE (HCC): ICD-10-CM

## 2021-03-29 DIAGNOSIS — R06.09 DYSPNEA ON EXERTION: ICD-10-CM

## 2021-03-29 DIAGNOSIS — I51.9 LV DYSFUNCTION: ICD-10-CM

## 2021-03-29 DIAGNOSIS — I27.20 PULMONARY HTN (HCC): ICD-10-CM

## 2021-03-29 LAB
BH CV ECHO MEAS - ACS: 2.1 CM
BH CV ECHO MEAS - AO MAX PG: 6 MMHG
BH CV ECHO MEAS - AO MEAN PG: 3 MMHG
BH CV ECHO MEAS - AO ROOT AREA (BSA CORRECTED): 1.4
BH CV ECHO MEAS - AO ROOT AREA: 6.6 CM^2
BH CV ECHO MEAS - AO ROOT DIAM: 2.9 CM
BH CV ECHO MEAS - AO V2 MAX: 122 CM/SEC
BH CV ECHO MEAS - AO V2 MEAN: 87.4 CM/SEC
BH CV ECHO MEAS - AO V2 VTI: 24.7 CM
BH CV ECHO MEAS - BSA(HAYCOCK): 2.2 M^2
BH CV ECHO MEAS - BSA: 2.1 M^2
BH CV ECHO MEAS - BZI_BMI: 41.8 KILOGRAMS/M^2
BH CV ECHO MEAS - BZI_METRIC_HEIGHT: 160 CM
BH CV ECHO MEAS - BZI_METRIC_WEIGHT: 107.1 KG
BH CV ECHO MEAS - EDV(CUBED): 96.7 ML
BH CV ECHO MEAS - EDV(MOD-SP4): 85 ML
BH CV ECHO MEAS - EDV(TEICH): 96.8 ML
BH CV ECHO MEAS - EF(CUBED): 61.1 %
BH CV ECHO MEAS - EF(MOD-SP4): 55.1 %
BH CV ECHO MEAS - EF(TEICH): 52.7 %
BH CV ECHO MEAS - ESV(CUBED): 37.6 ML
BH CV ECHO MEAS - ESV(MOD-SP4): 38.2 ML
BH CV ECHO MEAS - ESV(TEICH): 45.8 ML
BH CV ECHO MEAS - FS: 27 %
BH CV ECHO MEAS - IVS/LVPW: 1
BH CV ECHO MEAS - IVSD: 0.93 CM
BH CV ECHO MEAS - LA DIMENSION: 3.7 CM
BH CV ECHO MEAS - LA/AO: 1.3
BH CV ECHO MEAS - LV DIASTOLIC VOL/BSA (35-75): 41 ML/M^2
BH CV ECHO MEAS - LV IVRT: 0.1 SEC
BH CV ECHO MEAS - LV MASS(C)D: 142.6 GRAMS
BH CV ECHO MEAS - LV MASS(C)DI: 68.7 GRAMS/M^2
BH CV ECHO MEAS - LV SYSTOLIC VOL/BSA (12-30): 18.4 ML/M^2
BH CV ECHO MEAS - LVIDD: 4.6 CM
BH CV ECHO MEAS - LVIDS: 3.4 CM
BH CV ECHO MEAS - LVLD AP4: 7.1 CM
BH CV ECHO MEAS - LVLS AP4: 5.6 CM
BH CV ECHO MEAS - LVOT AREA (M): 2.8 CM^2
BH CV ECHO MEAS - LVOT AREA: 2.8 CM^2
BH CV ECHO MEAS - LVOT DIAM: 1.9 CM
BH CV ECHO MEAS - LVPWD: 0.92 CM
BH CV ECHO MEAS - MV A MAX VEL: 45.4 CM/SEC
BH CV ECHO MEAS - MV DEC SLOPE: 445 CM/SEC^2
BH CV ECHO MEAS - MV E MAX VEL: 86.1 CM/SEC
BH CV ECHO MEAS - MV E/A: 1.9
BH CV ECHO MEAS - RAP SYSTOLE: 10 MMHG
BH CV ECHO MEAS - RVDD: 2.5 CM
BH CV ECHO MEAS - RVSP: 35 MMHG
BH CV ECHO MEAS - SI(AO): 78.6 ML/M^2
BH CV ECHO MEAS - SI(CUBED): 28.5 ML/M^2
BH CV ECHO MEAS - SI(MOD-SP4): 22.6 ML/M^2
BH CV ECHO MEAS - SI(TEICH): 24.6 ML/M^2
BH CV ECHO MEAS - SV(AO): 163.1 ML
BH CV ECHO MEAS - SV(CUBED): 59.1 ML
BH CV ECHO MEAS - SV(MOD-SP4): 46.8 ML
BH CV ECHO MEAS - SV(TEICH): 51.1 ML
BH CV ECHO MEAS - TR MAX VEL: 250 CM/SEC
MAXIMAL PREDICTED HEART RATE: 178 BPM
STRESS TARGET HR: 151 BPM

## 2021-03-29 PROCEDURE — 93356 MYOCRD STRAIN IMG SPCKL TRCK: CPT

## 2021-03-29 PROCEDURE — 93306 TTE W/DOPPLER COMPLETE: CPT

## 2021-03-29 PROCEDURE — 93306 TTE W/DOPPLER COMPLETE: CPT | Performed by: INTERNAL MEDICINE

## 2021-07-08 ENCOUNTER — TELEPHONE (OUTPATIENT)
Dept: CARDIOLOGY | Facility: CLINIC | Age: 41
End: 2021-07-08

## 2021-07-08 DIAGNOSIS — R60.1 GENERALIZED EDEMA: ICD-10-CM

## 2021-07-08 DIAGNOSIS — R06.09 DYSPNEA ON EXERTION: ICD-10-CM

## 2021-07-08 DIAGNOSIS — I51.9 LV DYSFUNCTION: Primary | ICD-10-CM

## 2021-07-08 NOTE — TELEPHONE ENCOUNTER
We will review PCP notes when available. Advise can increased zaroxolyn to 5 mg 30 minutes prior to Lasix. Make a sooner follow up visit. If symptoms persist or worsen she needs to go to ER. She may need workup for PE and/or heart failure. Lab for d-dimer.

## 2021-07-08 NOTE — TELEPHONE ENCOUNTER
Patient called and said that she has had increased SOA with edema , she went to her PCP  , where she received a Lasix  injection . He had obtained a chest xray with questionable results. They are unable to send written  Results and we are unable to obtain disc.  Her PCP will call to discuss this.

## 2021-07-12 ENCOUNTER — TELEPHONE (OUTPATIENT)
Dept: CARDIOLOGY | Facility: CLINIC | Age: 41
End: 2021-07-12

## 2021-07-12 NOTE — TELEPHONE ENCOUNTER
Patient aware to take 2 extra tablets of Potassium now , then resume Potassium 10 MeQ tomorrow. .She is aware that all other labs are within normal range .

## 2021-07-12 NOTE — TELEPHONE ENCOUNTER
Please inform patient lab results received today and results are normal except potassium low at 3.3. According to last telephone encounter she is now taking potassium 10 meq daily. Advise to take 2 extra tablets to get potassium level up. D-dimer is normal and BNP normal.

## 2021-07-20 ENCOUNTER — OFFICE VISIT (OUTPATIENT)
Dept: PULMONOLOGY | Facility: CLINIC | Age: 41
End: 2021-07-20

## 2021-07-20 VITALS
HEIGHT: 64 IN | OXYGEN SATURATION: 98 % | HEART RATE: 71 BPM | TEMPERATURE: 97.8 F | SYSTOLIC BLOOD PRESSURE: 100 MMHG | DIASTOLIC BLOOD PRESSURE: 62 MMHG | WEIGHT: 230 LBS | BODY MASS INDEX: 39.27 KG/M2

## 2021-07-20 DIAGNOSIS — I27.20 PULMONARY HTN (HCC): ICD-10-CM

## 2021-07-20 DIAGNOSIS — E66.9 OBESITY (BMI 30-39.9): ICD-10-CM

## 2021-07-20 DIAGNOSIS — J98.6 DIAPHRAGM PARALYSIS: Primary | ICD-10-CM

## 2021-07-20 DIAGNOSIS — R06.02 SHORTNESS OF BREATH: ICD-10-CM

## 2021-07-20 PROCEDURE — 99214 OFFICE O/P EST MOD 30 MIN: CPT | Performed by: NURSE PRACTITIONER

## 2021-07-20 RX ORDER — ALBUTEROL SULFATE 90 UG/1
2 POWDER, METERED RESPIRATORY (INHALATION) EVERY 4 HOURS PRN
Qty: 1 EACH | Refills: 5 | Status: SHIPPED | OUTPATIENT
Start: 2021-07-20 | End: 2022-11-10

## 2021-07-20 NOTE — PROGRESS NOTES
"Chief Complaint  Shortness of Breath    Subjective          Renateanant Mitchell presents to [facility]MON[facility]ARE MEDICINE  History of Present Illness    Ms. Mitchell is a 41 year old female with a medical history significant for diabetes, hypothyroidism, hypertension,  And pulmonary hypertension.    She presents to [facility] for evaluation of shortness of breath.  She states that she has a history of non-Hodgkin's lymphoma. She reports that she has a paralyzed diaphragm that they believe these from previous cancer treatment. She states that she does experience shortness of breath with exertion but not at rest. She states that she does have an occasional cough, but does not complain of any increased secretions or difficulty coughing up secretions. She states that she previously saw Dr. Sanchez in [place] but was not satisfied with her care there and wanted to see someone else. She is a lifelong non-smoker. She states that she had a sleep study completed which was negative for sleep apnea.      Objective   Vital Signs:   /62   Pulse 71   Temp 97.8 °F (36.6 °C) (Temporal)   Ht 162.6 cm (64\")   Wt 104 kg (230 lb)   SpO2 98%   BMI 39.48 kg/m²     Physical Exam     GENERAL APPEARANCE: Well developed, well nourished, alert and cooperative, and appears to be in no acute distress.    HEAD: normocephalic. Atraumatic.    EYES: PERRL, EOMI. Vision is grossly intact.    THROAT: Oral cavity and pharynx normal. No inflammation, swelling, exudate, or lesions.     NECK: Neck supple.  No thyromegaly.    CARDIAC: Normal S1 and S2. No S3, S4 or murmurs. Rhythm is regular.     RESPIRATORY:Bilateral air entry positive. Bilateral diminished breath sounds. No wheezing, crackles or rhonchi noted.    GI: Positive bowel sounds. Soft, nondistended, nontender.     MUSCULOSKELETAL: No significant deformity or joint abnormality. No edema. Peripheral pulses intact. No varicosities.    NEUROLOGICAL: " Strength and sensation symmetric and intact throughout.     PSYCHIATRIC: The mental examination revealed the patient was oriented to person, place, and time.     Result Review :   The following data was reviewed by: KELLEY Pyle on 07/20/2021:      Data reviewed: Referral notes.          Assessment and Plan    Diagnoses and all orders for this visit:    1. Diaphragm paralysis (Primary)    2. Pulmonary HTN (CMS/HCC)    3. Obesity (BMI 30-39.9)    4. Shortness of breath    Other orders  -     albuterol (ProAir RespiClick) 108 (90 Base) MCG/ACT inhaler; Inhale 2 puffs Every 4 (Four) Hours As Needed for Wheezing.  Dispense: 1 each; Refill: 5        Paralyzed diaphragm/shortness of breath on exertion:    CT Chest report was reviewed and showed no evidence of pulmonary disease.    PFT was also reviewed and shows a moderate restrictive ventilatory defect.  DLCO is also reduced.    Will re-evaluate her shortness of breath at her next visit. She may be a possible candidate for a phrenic nerve stimulator or other surgical intervention.  Will discuss her case with Dr. Hernandez.    Will prescribe her albuterol to use every 4 hours if needed for shortness of breath.    Pulmonary Hypertension is noted.  Likely group 3.      Patient's Body mass index is 39.48 kg/m². indicating that she is morbidly obese (BMI > 40 or > 35 with obesity - related health condition). Obesity-related health conditions include the following: hypertension, coronary heart disease and dyslipidemias. Obesity is unchanged. BMI is is above average; BMI management plan is completed. We discussed portion control and increasing exercise..          Follow Up   Return in about 6 months (around 1/20/2022).  Patient was given instructions and counseling regarding her condition or for health maintenance advice. Please see specific information pulled into the AVS if appropriate.

## 2021-07-29 ENCOUNTER — OFFICE VISIT (OUTPATIENT)
Dept: CARDIOLOGY | Facility: CLINIC | Age: 41
End: 2021-07-29

## 2021-07-29 VITALS
DIASTOLIC BLOOD PRESSURE: 90 MMHG | HEIGHT: 64 IN | HEART RATE: 70 BPM | WEIGHT: 223.8 LBS | SYSTOLIC BLOOD PRESSURE: 130 MMHG | BODY MASS INDEX: 38.21 KG/M2

## 2021-07-29 DIAGNOSIS — R06.02 SHORTNESS OF BREATH: ICD-10-CM

## 2021-07-29 DIAGNOSIS — I10 ESSENTIAL HYPERTENSION: Primary | ICD-10-CM

## 2021-07-29 DIAGNOSIS — R00.2 PALPITATION: ICD-10-CM

## 2021-07-29 DIAGNOSIS — R00.0 TACHYCARDIA: ICD-10-CM

## 2021-07-29 DIAGNOSIS — C85.90 NON-HODGKIN'S LYMPHOMA, UNSPECIFIED BODY REGION, UNSPECIFIED NON-HODGKIN LYMPHOMA TYPE (HCC): ICD-10-CM

## 2021-07-29 DIAGNOSIS — E88.81 METABOLIC SYNDROME: ICD-10-CM

## 2021-07-29 PROCEDURE — 99213 OFFICE O/P EST LOW 20 MIN: CPT | Performed by: NURSE PRACTITIONER

## 2021-07-29 RX ORDER — BUPROPION HYDROCHLORIDE 150 MG/1
150 TABLET ORAL DAILY
COMMUNITY
End: 2022-02-03

## 2021-08-10 DIAGNOSIS — J98.6 DIAPHRAGM PARALYSIS: Primary | ICD-10-CM

## 2021-09-30 ENCOUNTER — TRANSCRIBE ORDERS (OUTPATIENT)
Dept: ADMINISTRATIVE | Facility: HOSPITAL | Age: 41
End: 2021-09-30

## 2021-09-30 DIAGNOSIS — Z01.818 PRE-OPERATIVE CLEARANCE: Primary | ICD-10-CM

## 2022-01-19 ENCOUNTER — TELEMEDICINE (OUTPATIENT)
Dept: PULMONOLOGY | Facility: CLINIC | Age: 42
End: 2022-01-19

## 2022-01-19 VITALS — OXYGEN SATURATION: 93 %

## 2022-01-19 DIAGNOSIS — R06.02 SHORTNESS OF BREATH: ICD-10-CM

## 2022-01-19 DIAGNOSIS — I27.20 PULMONARY HTN: ICD-10-CM

## 2022-01-19 DIAGNOSIS — J98.6 DIAPHRAGM PARALYSIS: ICD-10-CM

## 2022-01-19 DIAGNOSIS — J70.1 RADIATION-INDUCED PULMONARY FIBROSIS: Primary | ICD-10-CM

## 2022-01-19 PROCEDURE — 99443 PR PHYS/QHP TELEPHONE EVALUATION 21-30 MIN: CPT | Performed by: NURSE PRACTITIONER

## 2022-01-19 RX ORDER — AMOXICILLIN AND CLAVULANATE POTASSIUM 875; 125 MG/1; MG/1
1 TABLET, FILM COATED ORAL 2 TIMES DAILY
COMMUNITY
Start: 2022-01-17 | End: 2022-02-03

## 2022-01-19 NOTE — PROGRESS NOTES
Chief Complaint  Follow-up   You have chosen to receive care through a telephone visit. Do you consent to use a telephone visit for your medical care today? Yes    Subjective          Renate Mitchell presents to [facility] CRITICAL CARE MEDICINE  History of Present Illness     Ms. Mitchell is a 41 year old female with a medical history significant for arthritis, depression, diabetes, hyperthyroidism, hypertension,migraine, non hodgkin's lymphoma, and pulmonary hypertension.    She presents today for routine follow up on shortness of breath.  She states that she is following with CT surgery.   She tells me that they are thinking that she has radiation induced pulmonary fibrosis.  She tells me that they are wanting to do a lung biopsy and will follow them again on the 31st.  She also tells me that the CT surgeon wants her to have another PFT since having COVID.      Objective   Vital Signs:   SpO2 93%     Physical Exam     Physical exam was deferred as visit was done via telephone.    Result Review :   The following data was reviewed by: KELLEY Pyle on 01/19/2022:           Assessment and Plan    Diagnoses and all orders for this visit:    1. Radiation-induced pulmonary fibrosis (HCC) (Primary)    2. Pulmonary HTN (HCC)    3. Diaphragm paralysis    4. Shortness of breath        Paralyzed diaphragm/shortness of breath on exertion:    Following with CT surgery at .  She is suppose to see Pulmonary at  for evaluation prior to lung biopsy.  We will obtain these records.    We will continue albuterol every 4 hours.    Pulmonary hypertension is likely due to group 3.       Follow Up   No follow-ups on file.  Patient was given instructions and counseling regarding her condition or for health maintenance advice. Please see specific information pulled into the AVS if appropriate.     This visit has been rescheduled as a phone visit to comply with patient safety concerns  in accordance with CDC recommendations. Total time of discussion was 22 minutes.

## 2022-02-03 ENCOUNTER — OFFICE VISIT (OUTPATIENT)
Dept: CARDIOLOGY | Facility: CLINIC | Age: 42
End: 2022-02-03

## 2022-02-03 VITALS
HEART RATE: 80 BPM | TEMPERATURE: 97.5 F | DIASTOLIC BLOOD PRESSURE: 80 MMHG | WEIGHT: 228.6 LBS | SYSTOLIC BLOOD PRESSURE: 124 MMHG | BODY MASS INDEX: 39.03 KG/M2 | HEIGHT: 64 IN

## 2022-02-03 DIAGNOSIS — I51.9 LV DYSFUNCTION: Primary | ICD-10-CM

## 2022-02-03 DIAGNOSIS — E88.81 METABOLIC SYNDROME: ICD-10-CM

## 2022-02-03 DIAGNOSIS — R60.1 GENERALIZED EDEMA: ICD-10-CM

## 2022-02-03 DIAGNOSIS — R06.09 DYSPNEA ON EXERTION: ICD-10-CM

## 2022-02-03 DIAGNOSIS — R06.02 SHORTNESS OF BREATH: ICD-10-CM

## 2022-02-03 DIAGNOSIS — I10 PRIMARY HYPERTENSION: ICD-10-CM

## 2022-02-03 DIAGNOSIS — R00.2 PALPITATION: ICD-10-CM

## 2022-02-03 DIAGNOSIS — Z79.899 MEDICATION MANAGEMENT: ICD-10-CM

## 2022-02-03 PROCEDURE — 99214 OFFICE O/P EST MOD 30 MIN: CPT | Performed by: NURSE PRACTITIONER

## 2022-02-03 RX ORDER — SPIRONOLACTONE 25 MG/1
25 TABLET ORAL DAILY
Qty: 30 TABLET | Refills: 11 | Status: SHIPPED | OUTPATIENT
Start: 2022-02-03 | End: 2022-09-22

## 2022-02-03 RX ORDER — METOLAZONE 2.5 MG/1
TABLET ORAL
Qty: 90 TABLET | Refills: 3 | Status: SHIPPED | OUTPATIENT
Start: 2022-02-03 | End: 2022-09-22 | Stop reason: SDUPTHER

## 2022-02-03 RX ORDER — FUROSEMIDE 40 MG/1
40 TABLET ORAL DAILY
Qty: 90 TABLET | Refills: 3 | Status: SHIPPED | OUTPATIENT
Start: 2022-02-03 | End: 2022-09-22 | Stop reason: SDUPTHER

## 2022-02-03 NOTE — PROGRESS NOTES
Chief Complaint   Patient presents with   • Follow-up     Cardiac management   • Lab     Last labs in chart.   • Shortness of Breath     Has increased since having covid 9/2021. Had covid pneumonia went to [facility] ER 9/2021. Has been dx with CHF per Dr Haas. Had cardiac cath 10/2021, see chart. Has had work up for diaphragm surgery.   • Edema     Having in hands and feet, Dr Rosas asking if possible to increase duiretics.   • Med Refill     Needs refills on lasix and metolazone-90 day.       Subjective       Renate Mitchell is a 41 y.o. female with history of non-Hodgkin's lymphoma status post chemoradiation 2008, diabetes, hypertension, hypothyroidism, anxiety, fibromyalgia, CHF with preserved ejection fraction.  In 2008 she underwent cardiac window for pericardial effusion.  In 2015 cardiac catheterization showed normal coronary arteries.  Most recent echo in March 2021 showed EF of 51-55% with trace AI and MR.  RVSP was 35 mmHg.  Mild TR also noted.  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  She was referred to Dr. Manish Tillman thoracic surgeon at .  She underwent work-up including CT of chest that showed isolated fibrotic changes related to previous radiation treatment.  Nuclear VQ lung scan was negative for acute or chronic PE.  Cardiac MRI that showed mild global hypokinesis, LVEF 49%, and RVEF 54%.  In September 2021 she had COVID and required supplemental oxygen for 2 weeks.  Then in October she underwent right heart cath per Dr. Haas that demonstrated no pulmonary hypertension.     Current plan at  is for her to see pulmonologist at  to optimize pulmonary management prior to undergoing plication surgery.    Today she comes to the office for follow-up visit.  Her main complaint is generalized edema and ongoing shortness of breath.  She feels her weight definitely contributes to her  shortness of breath and interferes with her daily activities.  Chest pain, palpitations, and dizziness denied.    Cardiac History:    Past Surgical History:   Procedure Laterality Date   • CARDIAC BIOPSY     • CARDIAC CATHETERIZATION  05/07/2009    normal but small coronaries, EF 45-50%   • CARDIAC CATHETERIZATION  08/04/2015    essentially normal coronaries. EF 50%   • CARDIOVASCULAR STRESS TEST  01/11/2008    CESAR Cardiolyte- (Dr. Jay). EF 45% at rest, 53% with stress   • CARDIOVASCULAR STRESS TEST  02/10/2009    3 min, 20 sec. 95% THR. bp- 180/98, EF 49%. Negative   • CARDIOVASCULAR STRESS TEST  07/2015    R. Stress- 4 min, 103% THR. 106/68, negative by EKG criteria   • CARDIOVASCULAR STRESS TEST  05/18/2017    4 min 39 sec, 97% THR, 157/76, no obvious ischemia, increase Coreg   • CATH LAB PROCEDURE  10/27/2021    [facility], Dr EDU Haas- normal PA pressure, mild increase PCWP, negative for pulmonary hypertension   • CHOLECYSTECTOMY N/A 3/8/2018    Procedure: CHOLECYSTECTOMY LAPAROSCOPIC;  Surgeon: Artie Oneill MD;  Location:  [facility] OR;  Service:    • COLONOSCOPY N/A 12/22/2017    Procedure: COLONOSCOPY;  Surgeon: Artie Oneill MD;  Location:  [facility];  Service:    • CONVERTED (HISTORICAL) HOLTER  05/23/2017    sinus ave 96 bpm, 1 PVC, 4 PACs   • CONVERTED (HISTORICAL) HOLTER  08/28/2018    Avg 96, one 6 beat SVT   • ECHO - CONVERTED  02/10/2009    EF 50-55%   • ECHO - CONVERTED  07/20/2015    EF 40-45%, RVSP 43 mmHg mild MR   • ECHO - CONVERTED  05/18/2017    EF 50-55%, RVSP 37 mmHg, mild MR   • ECHO - CONVERTED  09/05/2018    TLS. EF 55%. RVSP- 33 mmHg.   • ECHO - CONVERTED  08/25/2020    TLS. EF 60%. Trace-Mild MR. RVSP- 40 mmHg.   • ECHO - CONVERTED  03/29/2021    TLS. EF 55%. Trace-Mild MR & AI. RVSP- 35 mmHg   • OTHER SURGICAL HISTORY  08/2015    CT abd/pelvis and chest- essentially unremarkable   • OTHER SURGICAL HISTORY  08/2015    HIDA scan- WNL   • PERICARDIAL WINDOW     • TOTAL LAPAROSCOPIC  HYSTERECTOMY N/A 3/8/2018    Procedure: TOTAL LAPAROSCOPIC HYSTERECTOMY BILATERAL SALPINGECTOMY AND LEFT OOPHORECTOMY WITH DAVINCI SI ROBOT;  Surgeon: Bill Greenwood DO;  Location: [facility];  Service:        Current Outpatient Medications   Medication Sig Dispense Refill   • albuterol (ProAir RespiClick) 108 (90 Base) MCG/ACT inhaler Inhale 2 puffs Every 4 (Four) Hours As Needed for Wheezing. 1 each 5   • carvedilol (COREG) 25 MG tablet Take 1 tablet by mouth 2 (Two) Times a Day. 60 tablet 11   • FLUoxetine (PROzac) 20 MG capsule Take 40 mg by mouth Daily.     • furosemide (LASIX) 40 MG tablet Take 1 tablet by mouth Daily. 90 tablet 3   • HYDROcodone-acetaminophen (NORCO) 7.5-325 MG per tablet Take 1 tablet by mouth 3 (Three) Times a Day.     • ibuprofen (ADVIL,MOTRIN) 800 MG tablet As Needed.     • levothyroxine (SYNTHROID, LEVOTHROID) 25 MCG tablet Take 50 mcg by mouth Daily.     • metOLazone (ZAROXOLYN) 2.5 MG tablet Take 30 minutes to one hour prior to lasix as needed for swelling 90 tablet 3   • omeprazole (priLOSEC) 20 MG capsule Take 20 mg by mouth Daily.     • pregabalin (LYRICA) 150 MG capsule Take 150 mg by mouth Every Night.     • promethazine (PHENERGAN) 25 MG tablet Take 25 mg by mouth As Needed.     • SUMAtriptan (IMITREX) 50 MG tablet 50 mg 1 (One) Time As Needed for Migraine. Take as directed      • tiZANidine (ZANAFLEX) 4 MG tablet Take 8 mg by mouth Every Night.     • VITAMIN D, CHOLECALCIFEROL, PO Take 5,000 Units by mouth Daily.     • spironolactone (ALDACTONE) 25 MG tablet Take 1 tablet by mouth Daily. 30 tablet 11     No current facility-administered medications for this visit.       Codeine, Cymbalta [duloxetine hcl], Neurontin [gabapentin], Prednisolone, Sulfa antibiotics, Tramadol hcl, and Tyloxapol    Past Medical History:   Diagnosis Date   • Abnormal uterine bleeding (AUB)    • Arthritis    • Arthritis, lumbar spine    • Chest biopsy    • Depression    • Diabetes mellitus (HCC)     • Difficulty walking 2008    Due to neuropathy, sometimes I can’t put no weight on my fee   • Disease of thyroid gland    • H/O exploratory laparotomy    • H/O tubal ligation    • Heart BX , benign    • Heart disease    • History of bone marrow biopsy    • History of chemotherapy    • History of radiation therapy    • HTN (hypertension)    • Migraine    • Neuropathy     CAUSED BY RADIATIONA ND CHEMO PER PATIENT   • Non Hodgkin's lymphoma (HCC)     very close to heart, in remission, last radiation 2/08, last chemo 12/07   • Pain     LOWER LEFT QUAD   • Pericardial effusion     s/p natty window- removed 398 cc    • Peripheral neuropathy 2008   • Pulmonary HTN (HCC)    • Tachycardia        Social History     Socioeconomic History   • Marital status: Single   Tobacco Use   • Smoking status: Never Smoker   • Smokeless tobacco: Never Used   Vaping Use   • Vaping Use: Never used   Substance and Sexual Activity   • Alcohol use: No   • Drug use: No   • Sexual activity: Yes     Partners: Male     Birth control/protection: Surgical       Family History   Problem Relation Age of Onset   • Heart disease Mother    • Hypertension Mother    • Heart failure Mother    • Hypertension Father    • Hyperlipidemia Father    • Heart disease Child    • Breast cancer Maternal Aunt    • Diabetes Maternal Aunt    • Colon cancer Maternal Uncle    • Leukemia Paternal Uncle    • Hodgkin's lymphoma Paternal Uncle    • Lung cancer Paternal Grandfather    • Leukemia Maternal Aunt        Review of Systems   Constitutional: Positive for malaise/fatigue and weight gain. Negative for decreased appetite, diaphoresis and fever.   HENT: Negative for nosebleeds.    Eyes: Negative for blurred vision.   Cardiovascular: Positive for dyspnea on exertion (inclines) and leg swelling. Negative for chest pain, claudication, cyanosis, irregular heartbeat, near-syncope, orthopnea, palpitations, paroxysmal nocturnal dyspnea and syncope.   Respiratory: Positive for  "shortness of breath. Negative for cough, sleep disturbances due to breathing and snoring.    Endocrine: Negative for cold intolerance and heat intolerance.   Hematologic/Lymphatic: Negative for bleeding problem. Does not bruise/bleed easily.   Skin: Negative for rash.   Musculoskeletal: Negative for myalgias.   Gastrointestinal: Negative for abdominal pain, heartburn, melena and nausea.   Genitourinary: Negative for dysuria and hematuria.   Neurological: Negative for dizziness and light-headedness.   Psychiatric/Behavioral: The patient is nervous/anxious. The patient does not have insomnia.    Allergic/Immunologic: Negative for persistent infections.        BP Readings from Last 5 Encounters:   02/03/22 124/80   07/29/21 130/90   07/20/21 100/62   02/23/21 130/70   08/31/20 128/80       Wt Readings from Last 5 Encounters:   02/03/22 104 kg (228 lb 9.6 oz)   07/29/21 102 kg (223 lb 12.8 oz)   07/20/21 104 kg (230 lb)   02/23/21 107 kg (236 lb)   08/31/20 106 kg (234 lb)       Objective     /80 (BP Location: Right arm)   Pulse 80   Temp 97.5 °F (36.4 °C)   Ht 162 cm (63.78\")   Wt 104 kg (228 lb 9.6 oz)   LMP 03/05/2018   BMI 39.51 kg/m²     Vitals and nursing note reviewed.   Eyes:      Pupils: Pupils are equal, round, and reactive to light.   HENT:      Head: Normocephalic.   Pulmonary:      Effort: Pulmonary effort is normal.      Breath sounds: Examination of the left-middle field reveals decreased breath sounds. Examination of the left-lower field reveals decreased breath sounds. Decreased breath sounds present.   Cardiovascular:      Normal rate. Regular rhythm.   Abdominal:      General: Bowel sounds are normal.      Palpations: Abdomen is soft.   Musculoskeletal: Normal range of motion.      Cervical back: Normal range of motion. Skin:     General: Skin is warm.   Neurological:      Mental Status: Alert and oriented to person, place, and time.          Procedures: none today         Assessment/Plan "   Diagnoses and all orders for this visit:    1. LV dysfunction (Primary)    2. Generalized edema  -     spironolactone (ALDACTONE) 25 MG tablet; Take 1 tablet by mouth Daily.  Dispense: 30 tablet; Refill: 11  -     metOLazone (ZAROXOLYN) 2.5 MG tablet; Take 30 minutes to one hour prior to lasix as needed for swelling  Dispense: 90 tablet; Refill: 3  -     furosemide (LASIX) 40 MG tablet; Take 1 tablet by mouth Daily.  Dispense: 90 tablet; Refill: 3    3. Primary hypertension  -     spironolactone (ALDACTONE) 25 MG tablet; Take 1 tablet by mouth Daily.  Dispense: 30 tablet; Refill: 11  -     metOLazone (ZAROXOLYN) 2.5 MG tablet; Take 30 minutes to one hour prior to lasix as needed for swelling  Dispense: 90 tablet; Refill: 3  -     furosemide (LASIX) 40 MG tablet; Take 1 tablet by mouth Daily.  Dispense: 90 tablet; Refill: 3    4. Shortness of breath    5. Dyspnea on exertion  -     spironolactone (ALDACTONE) 25 MG tablet; Take 1 tablet by mouth Daily.  Dispense: 30 tablet; Refill: 11  -     metOLazone (ZAROXOLYN) 2.5 MG tablet; Take 30 minutes to one hour prior to lasix as needed for swelling  Dispense: 90 tablet; Refill: 3  -     furosemide (LASIX) 40 MG tablet; Take 1 tablet by mouth Daily.  Dispense: 90 tablet; Refill: 3    6. Palpitation    7. Metabolic syndrome    8. Medication management  -     Comprehensive Metabolic Panel; Future      LV dysfunction-report of recent cardiac MR revealed showing LVEF of 49%.  She has persistent shortness of breath and edema.  We will try adding Aldactone in addition to Lasix.  Metolazone is only as needed.  For medication monitoring a lab order for CMP given which she plans to have done next week.    Palpitations-currently denied.  Her blood pressure, heart rate and rhythm are normal today.  Continue beta-blocker Coreg 25 mg twice a day.  Avoid caffeine.    Metabolic syndrome-Patient's Body mass index is 39.51 kg/m². indicating that she is obese (BMI >30). Obesity-related  health conditions include the following: hypertension and dyslipidemias. Obesity is worsening. BMI is is above average; BMI management plan is completed. We discussed portion control..     Patient has appointment with pulmonologist at  in follow-up visit with Dr. Tillman.  Patient understands to call for any cardiac symptoms or concerns.  Otherwise, a 6-month follow-up visit scheduled with Dr. Arevalo.                   Electronically signed by KELLEY Sanchez,  February 5, 2022 07:25 EST

## 2022-03-16 DIAGNOSIS — I27.20 PULMONARY HTN: ICD-10-CM

## 2022-03-16 DIAGNOSIS — R00.0 TACHYCARDIA: ICD-10-CM

## 2022-03-16 DIAGNOSIS — R00.2 PALPITATION: ICD-10-CM

## 2022-03-16 DIAGNOSIS — I10 ESSENTIAL HYPERTENSION: ICD-10-CM

## 2022-03-18 RX ORDER — CARVEDILOL 25 MG/1
25 TABLET ORAL 2 TIMES DAILY
Qty: 60 TABLET | Refills: 11 | Status: SHIPPED | OUTPATIENT
Start: 2022-03-18 | End: 2022-09-22 | Stop reason: SDUPTHER

## 2022-04-20 ENCOUNTER — OFFICE VISIT (OUTPATIENT)
Dept: PULMONOLOGY | Facility: CLINIC | Age: 42
End: 2022-04-20

## 2022-04-20 VITALS
DIASTOLIC BLOOD PRESSURE: 78 MMHG | SYSTOLIC BLOOD PRESSURE: 122 MMHG | OXYGEN SATURATION: 98 % | HEART RATE: 93 BPM | WEIGHT: 214 LBS | TEMPERATURE: 97.3 F | BODY MASS INDEX: 36.54 KG/M2 | HEIGHT: 64 IN

## 2022-04-20 DIAGNOSIS — J98.6 DIAPHRAGM DYSFUNCTION: ICD-10-CM

## 2022-04-20 DIAGNOSIS — R06.02 SHORTNESS OF BREATH: ICD-10-CM

## 2022-04-20 DIAGNOSIS — J70.1 RADIATION-INDUCED PULMONARY FIBROSIS: Primary | ICD-10-CM

## 2022-04-20 DIAGNOSIS — E66.9 OBESITY (BMI 30-39.9): ICD-10-CM

## 2022-04-20 PROCEDURE — 99214 OFFICE O/P EST MOD 30 MIN: CPT | Performed by: NURSE PRACTITIONER

## 2022-04-20 NOTE — PROGRESS NOTES
"Chief Complaint  Diaphragm paralysis    Qian Mitchell presents to [facility]  History of Present Illness     Ms. Paez is a 41 year old female with a medical history significant for arthritis, depression, hypothyroidism, diabetes, hypertension, migraines, non-hodgkin's lymphoma in remission, and pulmonary hypertension.    She presents today for routine follow up on diaphragm paralysis.  She states that she has been doing well since her last visit.  She continues to follow with  oncology and CT surgery/pulmonology for diaphragm paralysis and history of non-hodgkin's lyphoma in remission.  She also tells me that she has had COVID since she last saw me.  She tells me that she was told that she did not need a lung biopsy.  Surgery for her diaphragm was discussed with her doctor at  and she tells me that she has decided to hold off on surgery for now. She states that she is trying to lose weight, to see if this improves her breathing.           Objective   Vital Signs:   /78   Pulse 93   Temp 97.3 °F (36.3 °C) (Temporal)   Ht 162.6 cm (64\")   Wt 97.1 kg (214 lb)   SpO2 98%   BMI 36.73 kg/m²     BMI is above normal parameters. Recommendations: exercise counseling/recommendations and nutrition counseling/recommendations       Physical Exam     GENERAL APPEARANCE: Well developed, well nourished, alert and cooperative, and appears to be in no acute distress.    HEAD: normocephalic. Atraumatic.    EYES: PERRL, EOMI. Vision is grossly intact.    THROAT: Oral cavity and pharynx normal. No inflammation, swelling, exudate, or lesions.     NECK: Neck supple.  No thyromegaly.    CARDIAC: Normal S1 and S2. No S3, S4 or murmurs. Rhythm is regular.     RESPIRATORY:Bilateral air entry positive. Bilateral diminished breath sounds. No wheezing, crackles or rhonchi noted.    GI: Positive bowel sounds. Soft, nondistended, nontender. "     MUSCULOSKELETAL: No significant deformity or joint abnormality. No edema. Peripheral pulses intact. No varicosities.    NEUROLOGICAL: Strength and sensation symmetric and intact throughout.     PSYCHIATRIC: The mental examination revealed the patient was oriented to person, place, and time.     Result Review :   The following data was reviewed by: KELLEY Pyle on 04/20/2022:  Common labs    Common Labsle 9/7/21 9/7/21 9/20/21    1125 1125    Glucose  141 (A)    BUN  18    Creatinine  0.71    eGFR Non  Am  >60    eGFR African Am  >60    Sodium  141    Potassium  3.4 (A)    Chloride  101    Calcium  9.5    Albumin  4.6    Total Bilirubin  0.2    Alkaline Phosphatase  85    AST (SGOT)  25    ALT (SGPT)  27    WBC 6.96     Hemoglobin 13.2     Hematocrit 39.9  38.4   Platelets 306     (A) Abnormal value       Comments are available for some flowsheets but are not being displayed.                Assessment and Plan {CC Problem List  Visit Diagnosis   ROS  Review (Popup)  Health Maintenance  Quality  BestPractice  Medications  SmartSets  SnapShot Encounters  Media :23}   Diagnoses and all orders for this visit:    1. Radiation-induced pulmonary fibrosis (HCC) (Primary)    2. Diaphragm dysfunction    3. Shortness of breath    4. Obesity (BMI 30-39.9)          Paralyzed diaphragm/shortness of breath on exertion:   -She is following with [facility] pulmonology.  PFT was recently completed and showed no obstruction with no significant bronchodilator response.  DLCO was mildly reduced and a moderately severe restriction was noted.  CT chest showed a mediastinal mass that was consistent with lymphoma and isolated fibrotic changes likely related to radiation induced fibrosis.  Pulmonology told her that she did not need any type of lung biopsy.  She tells me that they discussed diaphragm plication surgery but that she chose to hold off on this for now.  She is trying to lose weight to see if this  improves her shortness of breath.          Following with CT surgery at .  She is suppose to see Pulmonary at  for evaluation prior to lung biopsy.  We will obtain these records.    We will continue albuterol every 4 hours.    Pulmonary hypertension is likely due to group 3.         Class 2 Severe Obesity (BMI >=35 and <=39.9). Obesity-related health conditions include the following: hypertension, diabetes mellitus and dyslipidemias. Obesity is unchanged. BMI is is above average; BMI management plan is completed. We discussed portion control and increasing exercise.            Follow Up   Return in about 6 months (around 10/20/2022).  Patient was given instructions and counseling regarding her condition or for health maintenance advice. Please see specific information pulled into the AVS if appropriate.

## 2022-06-29 ENCOUNTER — TELEPHONE (OUTPATIENT)
Dept: CARDIOLOGY | Facility: CLINIC | Age: 42
End: 2022-06-29

## 2022-06-29 NOTE — TELEPHONE ENCOUNTER
She can take an extra Lasix. She will need to ensure rich potassium food. If she needs to take extra dose very often we need to recheck electrolyte levels.

## 2022-06-29 NOTE — TELEPHONE ENCOUNTER
Patient called said her SOA is  worse today than usual.   She could not take Spironolactone , said it didn't help her .  She is currently taking :   Lasix 40 mg daily  Coreg 25 mg daily  Metolazone 2.5 mg  As needed  She has not taken any Metolazone in last few days .  She asked if she could take extra Lasix ?

## 2022-06-29 NOTE — TELEPHONE ENCOUNTER
Patient aware she may take an extra dose of Lasix 40 mg if needed. She is aware to eat potassium rich foods

## 2022-07-20 ENCOUNTER — HOSPITAL ENCOUNTER (OUTPATIENT)
Dept: MRI IMAGING | Facility: HOSPITAL | Age: 42
End: 2022-07-20

## 2022-07-28 ENCOUNTER — HOSPITAL ENCOUNTER (OUTPATIENT)
Dept: MRI IMAGING | Facility: HOSPITAL | Age: 42
Discharge: HOME | End: 2022-07-28
Admitting: RADIOLOGY

## 2022-07-28 DIAGNOSIS — Z85.3 PERSONAL HISTORY OF MALIGNANT NEOPLASM OF BREAST: ICD-10-CM

## 2022-07-28 PROCEDURE — 77049 MRI BREAST C-+ W/CAD BI: CPT

## 2022-07-28 PROCEDURE — 77049 MRI BREAST C-+ W/CAD BI: CPT | Performed by: RADIOLOGY

## 2022-07-28 PROCEDURE — A9577 INJ MULTIHANCE: HCPCS | Performed by: RADIOLOGY

## 2022-07-28 PROCEDURE — 0 GADOBENATE DIMEGLUMINE 529 MG/ML SOLUTION: Performed by: RADIOLOGY

## 2022-07-28 RX ADMIN — GADOBENATE DIMEGLUMINE 20 ML: 529 INJECTION, SOLUTION INTRAVENOUS at 09:47

## 2022-09-22 ENCOUNTER — OFFICE VISIT (OUTPATIENT)
Dept: CARDIOLOGY | Facility: CLINIC | Age: 42
End: 2022-09-22

## 2022-09-22 VITALS
WEIGHT: 215 LBS | SYSTOLIC BLOOD PRESSURE: 128 MMHG | HEIGHT: 64 IN | DIASTOLIC BLOOD PRESSURE: 78 MMHG | HEART RATE: 88 BPM | BODY MASS INDEX: 36.7 KG/M2

## 2022-09-22 DIAGNOSIS — C85.90 NON-HODGKIN LYMPHOMA IN REMISSION: ICD-10-CM

## 2022-09-22 DIAGNOSIS — E11.42 DIABETIC PERIPHERAL NEUROPATHY ASSOCIATED WITH TYPE 2 DIABETES MELLITUS: ICD-10-CM

## 2022-09-22 DIAGNOSIS — R00.0 TACHYCARDIA: ICD-10-CM

## 2022-09-22 DIAGNOSIS — I10 PRIMARY HYPERTENSION: ICD-10-CM

## 2022-09-22 DIAGNOSIS — E11.9 TYPE 2 DIABETES MELLITUS WITHOUT COMPLICATION, WITHOUT LONG-TERM CURRENT USE OF INSULIN: ICD-10-CM

## 2022-09-22 DIAGNOSIS — R06.09 DYSPNEA ON EXERTION: ICD-10-CM

## 2022-09-22 DIAGNOSIS — R00.2 PALPITATION: ICD-10-CM

## 2022-09-22 DIAGNOSIS — R06.02 SHORTNESS OF BREATH: Primary | ICD-10-CM

## 2022-09-22 DIAGNOSIS — E88.81 METABOLIC SYNDROME: ICD-10-CM

## 2022-09-22 PROBLEM — C85.9A NON-HODGKIN LYMPHOMA IN REMISSION: Status: ACTIVE | Noted: 2021-02-23

## 2022-09-22 PROCEDURE — 99214 OFFICE O/P EST MOD 30 MIN: CPT | Performed by: INTERNAL MEDICINE

## 2022-09-22 RX ORDER — CARVEDILOL 25 MG/1
25 TABLET ORAL 2 TIMES DAILY
Qty: 60 TABLET | Refills: 11 | Status: SHIPPED | OUTPATIENT
Start: 2022-09-22 | End: 2023-03-29 | Stop reason: SDUPTHER

## 2022-09-22 RX ORDER — METOLAZONE 2.5 MG/1
TABLET ORAL
Qty: 90 TABLET | Refills: 3 | Status: SHIPPED | OUTPATIENT
Start: 2022-09-22 | End: 2023-03-29 | Stop reason: SDUPTHER

## 2022-09-22 RX ORDER — SEMAGLUTIDE 1.34 MG/ML
INJECTION, SOLUTION SUBCUTANEOUS WEEKLY
COMMUNITY
End: 2022-11-10 | Stop reason: SDUPTHER

## 2022-09-22 RX ORDER — FUROSEMIDE 40 MG/1
40 TABLET ORAL DAILY
Qty: 90 TABLET | Refills: 3 | Status: SHIPPED | OUTPATIENT
Start: 2022-09-22 | End: 2023-03-29 | Stop reason: SDUPTHER

## 2022-09-22 RX ORDER — LEVOTHYROXINE SODIUM 0.05 MG/1
50 TABLET ORAL DAILY
COMMUNITY

## 2022-09-22 NOTE — PROGRESS NOTES
Chief Complaint   Patient presents with   • Follow-up     For cardiac management     • Labs     PCP checked routine labs a month ago, was not told anything was abnormal   • Med Refill      Refills needed on cardiac meds, 90 days to [facility]   • Shortness of Breath     With exertion or after laying down. Using 3 pillows to sleep   • Medication Problem     Took the Aldactone for about 2 weeks and seemed to have more fluid retention than when not taking it. Stopped the aldactone, Took the metolazone for a few days with her Lasix and didn't restart the Aldactone.        CARDIAC COMPLAINTS  dyspnea and fatigue        Subjective   Renate Padmini Mitchell is a 42 y.o. female came in today for her regular follow-up visit.  She has a very complicated problem in the form of non-Hodgkin's lymphoma for which she was treated with chemoradiation in 2007 and 2008.  She did develop significant problems with paralysis of the diaphragm and significant restrictive airway disease.  She also underwent right heart catheterization at  in October of last year and found to have PA pressure of 37/16/24 mmHg.  She came today stating that she still has some shortness of breath and she also has orthopnea.  She apparently checked her oxygen and most of the time his O2 saturation is normal.  Her last lab work was in February.  She was given Aldactone but she felt the Aldactone causing more problem so she stopped taking it.  She now takes Lasix every day and metolazone as needed.              Cardiac History  Past Surgical History:   Procedure Laterality Date   • BREAST BIOPSY Right 2019   • CARDIAC BIOPSY     • CARDIAC CATHETERIZATION  05/07/2009    normal but small coronaries, EF 45-50%   • CARDIAC CATHETERIZATION  08/04/2015    essentially normal coronaries. EF 50%   • CARDIAC CATHETERIZATION  10/27/2021    [facility], Dr EDU Haas- normal PA pressure, mild increase PCWP, negative for pulmonary hypertension   • CARDIOVASCULAR STRESS TEST   01/11/2008    [facility]- (Dr. Jay). EF 45% at rest, 53% with stress   • CARDIOVASCULAR STRESS TEST  02/10/2009    3 min, 20 sec. 95% THR. bp- 180/98, EF 49%. Negative   • CARDIOVASCULAR STRESS TEST  07/2015    R. Stress- 4 min, 103% THR. 106/68, negative by EKG criteria   • CARDIOVASCULAR STRESS TEST  05/18/2017    4 min 39 sec, 97% THR, 157/76, no obvious ischemia, increase Coreg   • CHOLECYSTECTOMY N/A 03/08/2018    Procedure: CHOLECYSTECTOMY LAPAROSCOPIC;  Surgeon: Artie Oneill MD;  Location: [facility];  Service:    • COLONOSCOPY N/A 12/22/2017    Procedure: COLONOSCOPY;  Surgeon: Artie Oneill MD;  Location: [facility];  Service:    • CONVERTED (HISTORICAL) HOLTER  05/23/2017    sinus ave 96 bpm, 1 PVC, 4 PACs   • CONVERTED (HISTORICAL) HOLTER  08/28/2018    Avg 96, one 6 beat SVT   • ECHO - CONVERTED  02/10/2009    EF 50-55%   • ECHO - CONVERTED  07/20/2015    EF 40-45%, RVSP 43 mmHg mild MR   • ECHO - CONVERTED  05/18/2017    EF 50-55%, RVSP 37 mmHg, mild MR   • ECHO - CONVERTED  09/05/2018    TLS. EF 55%. RVSP- 33 mmHg.   • ECHO - CONVERTED  08/25/2020    TLS. EF 60%. Trace-Mild MR. RVSP- 40 mmHg.   • ECHO - CONVERTED  03/29/2021    TLS. EF 55%. Trace-Mild MR & AI. RVSP- 35 mmHg   • HIP SURGERY Left 2020    lypoma excision   • OTHER SURGICAL HISTORY  08/2015    CT abd/pelvis and chest- essentially unremarkable   • OTHER SURGICAL HISTORY  08/2015    HIDA scan- WNL   • PERICARDIAL WINDOW     • TOTAL LAPAROSCOPIC HYSTERECTOMY N/A 03/08/2018    Procedure: TOTAL LAPAROSCOPIC HYSTERECTOMY BILATERAL SALPINGECTOMY AND LEFT OOPHORECTOMY WITH DAVINCI SI ROBOT;  Surgeon: Bill Greenwood DO;  Location: [facility];  Service:        Current Outpatient Medications   Medication Sig Dispense Refill   • albuterol (ProAir RespiClick) 108 (90 Base) MCG/ACT inhaler Inhale 2 puffs Every 4 (Four) Hours As Needed for Wheezing. 1 each 5   • carvedilol (COREG) 25 MG tablet Take 1 tablet by mouth 2 (Two) Times a Day. 60  tablet 11   • FLUoxetine (PROzac) 20 MG capsule Take 40 mg by mouth Daily.     • furosemide (LASIX) 40 MG tablet Take 1 tablet by mouth Daily. 90 tablet 3   • HYDROcodone-acetaminophen (NORCO) 7.5-325 MG per tablet Take 1 tablet by mouth 3 (Three) Times a Day.     • ibuprofen (ADVIL,MOTRIN) 800 MG tablet As Needed.     • levothyroxine (SYNTHROID, LEVOTHROID) 50 MCG tablet Take 50 mcg by mouth Daily.     • metOLazone (ZAROXOLYN) 2.5 MG tablet Take 30 minutes to one hour prior to lasix as needed for swelling 90 tablet 3   • omeprazole (priLOSEC) 20 MG capsule Take 20 mg by mouth Daily.     • pregabalin (LYRICA) 150 MG capsule Take 150 mg by mouth Every Night.     • ProAir  (90 Base) MCG/ACT inhaler INHALE 2 PUFFS BY MOUTH EVERY 4 HOURS AS NEEDED 8.5 g 2   • promethazine (PHENERGAN) 25 MG tablet Take 25 mg by mouth As Needed.     • SUMAtriptan (IMITREX) 50 MG tablet 50 mg 1 (One) Time As Needed for Migraine. Take as directed      • tiZANidine (ZANAFLEX) 4 MG tablet Take 8 mg by mouth Every Night.     • VITAMIN D, CHOLECALCIFEROL, PO Take 5,000 Units by mouth Daily.     • Semaglutide,0.25 or 0.5MG/DOS, (Ozempic, 0.25 or 0.5 MG/DOSE,) 2 MG/1.5ML solution pen-injector Inject  under the skin into the appropriate area as directed 1 (One) Time Per Week.       No current facility-administered medications for this visit.       Allergies  :  Codeine, Cymbalta [duloxetine hcl], Neurontin [gabapentin], Prednisolone, Sulfa antibiotics, Tramadol hcl, and Tyloxapol       Past Medical History:   Diagnosis Date   • Abnormal uterine bleeding (AUB)    • Arthritis    • Arthritis, lumbar spine    • Chest biopsy    • Depression    • Diabetes mellitus (HCC)    • Diabetic peripheral neuropathy associated with type 2 diabetes mellitus (HCC) 9/22/2022   • Difficulty walking 2008    Due to neuropathy, sometimes I can’t put no weight on my fee   • Disease of thyroid gland    • H/O exploratory laparotomy    • H/O tubal ligation    • Heart  BX , benign    • Heart disease    • History of bone marrow biopsy    • History of chemotherapy    • History of radiation therapy    • HTN (hypertension)    • Migraine    • Neuropathy     CAUSED BY RADIATIONA ND CHEMO PER PATIENT   • Non Hodgkin's lymphoma (HCC)     very close to heart, in remission, last radiation 2/08, last chemo 12/07   • Pain     LOWER LEFT QUAD   • Pericardial effusion     s/p natty window- removed 398 cc    • Peripheral neuropathy 2008   • Pulmonary HTN (HCC)    • Tachycardia        Social History     Socioeconomic History   • Marital status: Single   Tobacco Use   • Smoking status: Never Smoker   • Smokeless tobacco: Never Used   Vaping Use   • Vaping Use: Never used   Substance and Sexual Activity   • Alcohol use: No   • Drug use: No   • Sexual activity: Yes     Partners: Male     Birth control/protection: Surgical       Family History   Problem Relation Age of Onset   • Heart disease Mother    • Hypertension Mother    • Heart failure Mother    • Hypertension Father    • Hyperlipidemia Father    • Heart disease Child    • Breast cancer Maternal Aunt    • Diabetes Maternal Aunt    • Colon cancer Maternal Uncle    • Leukemia Paternal Uncle    • Hodgkin's lymphoma Paternal Uncle    • Lung cancer Paternal Grandfather    • Leukemia Maternal Aunt        Review of Systems   Constitutional: Positive for malaise/fatigue. Negative for decreased appetite.   HENT: Negative for congestion and sore throat.    Eyes: Negative for blurred vision.   Cardiovascular: Positive for dyspnea on exertion and orthopnea. Negative for chest pain.   Respiratory: Positive for shortness of breath. Negative for snoring.    Endocrine: Negative for cold intolerance and heat intolerance.   Hematologic/Lymphatic: Negative for adenopathy. Does not bruise/bleed easily.   Skin: Negative for itching, nail changes and skin cancer.   Musculoskeletal: Negative for arthritis and myalgias.   Gastrointestinal: Negative for abdominal pain,  "dysphagia and heartburn.   Genitourinary: Negative for bladder incontinence and frequency.   Neurological: Negative for dizziness, light-headedness, seizures and vertigo.   Psychiatric/Behavioral: Negative for altered mental status.   Allergic/Immunologic: Negative for environmental allergies and hives.       Diabetes- Yes  Thyroid- abnormal    Objective     /78   Pulse 88   Ht 162.6 cm (64\")   Wt 97.5 kg (215 lb)   LMP 03/05/2018   BMI 36.90 kg/m²     Vitals and nursing note reviewed.   Constitutional:       Appearance: Healthy appearance. Not in distress.   Eyes:      Conjunctiva/sclera: Conjunctivae normal.      Pupils: Pupils are equal, round, and reactive to light.   HENT:      Head: Normocephalic.   Pulmonary:      Effort: Pulmonary effort is normal.      Breath sounds: Examination of the left-middle field reveals decreased breath sounds. Examination of the left-lower field reveals decreased breath sounds. Decreased breath sounds present.   Cardiovascular:      PMI at left midclavicular line. Normal rate. Regular rhythm.      Murmurs: There is a systolic murmur.   Edema:     Ankle: bilateral trace edema of the ankle.     Feet: trace edema of the right foot.  Abdominal:      General: Bowel sounds are normal.      Palpations: Abdomen is soft.   Musculoskeletal: Normal range of motion.      Cervical back: Normal range of motion and neck supple. Skin:     General: Skin is warm and dry.   Neurological:      Mental Status: Alert, oriented to person, place, and time and oriented to person, place and time.       Procedures            @ASSESSMENT/PLAN@  Class 2 Severe Obesity (BMI >=35 and <=39.9). Obesity-related health conditions include the following: hypertension, diabetes mellitus and dyslipidemias. Obesity is unchanged. BMI is is above average; BMI management plan is completed. We discussed portion control and increasing exercise.     Diagnoses and all orders for this visit:    1. Shortness of breath " (Primary)  -     Adult Transthoracic Echo Complete W/ Cont if Necessary Per Protocol; Future  -     CBC & Differential; Future  -     BNP; Future    2. Metabolic syndrome  -     Comprehensive Metabolic Panel; Future    3. Type 2 diabetes mellitus without complication, without long-term current use of insulin (HCC)  -     Hemoglobin A1c; Future    4. Diabetic peripheral neuropathy associated with type 2 diabetes mellitus (HCC)    5. Dyspnea on exertion  -     metOLazone (ZAROXOLYN) 2.5 MG tablet; Take 30 minutes to one hour prior to lasix as needed for swelling  Dispense: 90 tablet; Refill: 3  -     furosemide (LASIX) 40 MG tablet; Take 1 tablet by mouth Daily.  Dispense: 90 tablet; Refill: 3  -     BNP; Future    6. Primary hypertension  -     metOLazone (ZAROXOLYN) 2.5 MG tablet; Take 30 minutes to one hour prior to lasix as needed for swelling  Dispense: 90 tablet; Refill: 3  -     furosemide (LASIX) 40 MG tablet; Take 1 tablet by mouth Daily.  Dispense: 90 tablet; Refill: 3  -     Comprehensive Metabolic Panel; Future    7. Palpitation  -     Adult Transthoracic Echo Complete W/ Cont if Necessary Per Protocol; Future  -     Magnesium; Future  -     TSH; Future    8. Tachycardia  -     carvedilol (COREG) 25 MG tablet; Take 1 tablet by mouth 2 (Two) Times a Day.  Dispense: 60 tablet; Refill: 11  -     Magnesium; Future  -     TSH; Future    At baseline, her heart rate and blood pressure appears stable.  Her BMI is still around 37.  No significant changes noted.  Her cardiovascular examination was unremarkable.  She does have trace pedal edema.    Regarding her shortness of breath which is her predominant symptom, it appears to be more pulmonary rather than cardiac but I cannot rule out LV dysfunction.  I advised her to repeat the echocardiogram to reevaluate the EF and also check the PA pressure.  Meanwhile I also advised her to check the BNP level.    Regarding the metabolic syndrome, I talked to her again about  diet especially cutting down on the carbohydrate.  She needs her electrolytes checked along with liver function test and renal function    Regarding her diabetes which is causing neuropathy, she is on Ozempic and has lost some weight.  Encourage her to continue to do so.    Regarding the dyspnea and orthopnea, continue the Lasix and Zaroxolyn as needed.  If the BNP is high then we can increase the dose of Zaroxolyn.    Regarding her hypertension, it seems to be controlled with Coreg along with the diuretics.  Continue the same    Regarding her history of palpitation, I advised her to check a TSH and magnesium level    Regarding her history of tachycardia Coreg seems to be working so continue the same    Based on the echo and the labs, further recommendations will be made.                Electronically signed by Matthew Arevalo MD September 22, 2022 14:26 EDT

## 2022-10-10 ENCOUNTER — HOSPITAL ENCOUNTER (OUTPATIENT)
Dept: CARDIOLOGY | Facility: HOSPITAL | Age: 42
Discharge: HOME | End: 2022-10-10

## 2022-10-10 ENCOUNTER — LAB (OUTPATIENT)
Dept: LAB | Facility: HOSPITAL | Age: 42
End: 2022-10-10

## 2022-10-10 DIAGNOSIS — R06.02 SHORTNESS OF BREATH: ICD-10-CM

## 2022-10-10 DIAGNOSIS — E88.81 METABOLIC SYNDROME: ICD-10-CM

## 2022-10-10 DIAGNOSIS — E11.9 TYPE 2 DIABETES MELLITUS WITHOUT COMPLICATION, WITHOUT LONG-TERM CURRENT USE OF INSULIN: ICD-10-CM

## 2022-10-10 DIAGNOSIS — I10 PRIMARY HYPERTENSION: ICD-10-CM

## 2022-10-10 DIAGNOSIS — R00.2 PALPITATION: ICD-10-CM

## 2022-10-10 DIAGNOSIS — R06.09 DYSPNEA ON EXERTION: ICD-10-CM

## 2022-10-10 DIAGNOSIS — R00.0 TACHYCARDIA: ICD-10-CM

## 2022-10-10 LAB
ALBUMIN SERPL-MCNC: 4.63 G/DL (ref 3.5–5.2)
ALBUMIN/GLOB SERPL: 1.6 G/DL
ALP SERPL-CCNC: 124 U/L (ref 39–117)
ALT SERPL W P-5'-P-CCNC: 34 U/L (ref 1–33)
ANION GAP SERPL CALCULATED.3IONS-SCNC: 12.7 MMOL/L (ref 5–15)
AST SERPL-CCNC: 30 U/L (ref 1–32)
BASOPHILS # BLD AUTO: 0.04 10*3/MM3 (ref 0–0.2)
BASOPHILS NFR BLD AUTO: 0.6 % (ref 0–1.5)
BH CV ECHO MEAS - ACS: 2.07 CM
BH CV ECHO MEAS - AO MAX PG: 4.7 MMHG
BH CV ECHO MEAS - AO MEAN PG: 3 MMHG
BH CV ECHO MEAS - AO ROOT DIAM: 2.8 CM
BH CV ECHO MEAS - AO V2 MAX: 108.6 CM/SEC
BH CV ECHO MEAS - AO V2 VTI: 21.9 CM
BH CV ECHO MEAS - EDV(CUBED): 41.1 ML
BH CV ECHO MEAS - EDV(MOD-SP4): 74.6 ML
BH CV ECHO MEAS - EF(MOD-SP4): 59.2 %
BH CV ECHO MEAS - ESV(CUBED): 17.2 ML
BH CV ECHO MEAS - ESV(MOD-SP4): 30.4 ML
BH CV ECHO MEAS - FS: 25.2 %
BH CV ECHO MEAS - IVS/LVPW: 1.12 CM
BH CV ECHO MEAS - IVSD: 1.37 CM
BH CV ECHO MEAS - LA DIMENSION: 3.8 CM
BH CV ECHO MEAS - LAT PEAK E' VEL: 11.9 CM/SEC
BH CV ECHO MEAS - LV DIASTOLIC VOL/BSA (35-75): 37 CM2
BH CV ECHO MEAS - LV MASS(C)D: 149.8 GRAMS
BH CV ECHO MEAS - LV SYSTOLIC VOL/BSA (12-30): 15.1 CM2
BH CV ECHO MEAS - LVIDD: 3.5 CM
BH CV ECHO MEAS - LVIDS: 2.6 CM
BH CV ECHO MEAS - LVOT AREA: 3 CM2
BH CV ECHO MEAS - LVOT DIAM: 1.96 CM
BH CV ECHO MEAS - LVPWD: 1.22 CM
BH CV ECHO MEAS - MED PEAK E' VEL: 8.5 CM/SEC
BH CV ECHO MEAS - MV A MAX VEL: 50.6 CM/SEC
BH CV ECHO MEAS - MV DEC SLOPE: 455.8 CM/SEC2
BH CV ECHO MEAS - MV E MAX VEL: 76.7 CM/SEC
BH CV ECHO MEAS - MV E/A: 1.52
BH CV ECHO MEAS - RAP SYSTOLE: 10 MMHG
BH CV ECHO MEAS - RVDD: 2.21 CM
BH CV ECHO MEAS - RVSP: 33.4 MMHG
BH CV ECHO MEAS - SI(MOD-SP4): 21.9 ML/M2
BH CV ECHO MEAS - SV(MOD-SP4): 44.2 ML
BH CV ECHO MEAS - TR MAX PG: 23.4 MMHG
BH CV ECHO MEAS - TR MAX VEL: 241.6 CM/SEC
BH CV ECHO MEASUREMENTS AVERAGE E/E' RATIO: 7.52
BILIRUB SERPL-MCNC: 0.5 MG/DL (ref 0–1.2)
BUN SERPL-MCNC: 15 MG/DL (ref 6–20)
BUN/CREAT SERPL: 16.5 (ref 7–25)
CALCIUM SPEC-SCNC: 9.6 MG/DL (ref 8.6–10.5)
CHLORIDE SERPL-SCNC: 95 MMOL/L (ref 98–107)
CO2 SERPL-SCNC: 31.3 MMOL/L (ref 22–29)
CREAT SERPL-MCNC: 0.91 MG/DL (ref 0.57–1)
DEPRECATED RDW RBC AUTO: 41.5 FL (ref 37–54)
EGFRCR SERPLBLD CKD-EPI 2021: 79.9 ML/MIN/1.73
EOSINOPHIL # BLD AUTO: 0.1 10*3/MM3 (ref 0–0.4)
EOSINOPHIL NFR BLD AUTO: 1.6 % (ref 0.3–6.2)
ERYTHROCYTE [DISTWIDTH] IN BLOOD BY AUTOMATED COUNT: 12.7 % (ref 12.3–15.4)
GLOBULIN UR ELPH-MCNC: 2.9 GM/DL
GLUCOSE SERPL-MCNC: 109 MG/DL (ref 65–99)
HBA1C MFR BLD: 5.7 % (ref 4.8–5.6)
HCT VFR BLD AUTO: 38.7 % (ref 34–46.6)
HGB BLD-MCNC: 13.2 G/DL (ref 12–15.9)
IMM GRANULOCYTES # BLD AUTO: 0.02 10*3/MM3 (ref 0–0.05)
IMM GRANULOCYTES NFR BLD AUTO: 0.3 % (ref 0–0.5)
LEFT ATRIUM VOLUME INDEX: 11.1 ML/M2
LYMPHOCYTES # BLD AUTO: 1.98 10*3/MM3 (ref 0.7–3.1)
LYMPHOCYTES NFR BLD AUTO: 31 % (ref 19.6–45.3)
MAGNESIUM SERPL-MCNC: 2.3 MG/DL (ref 1.6–2.6)
MAXIMAL PREDICTED HEART RATE: 176 BPM
MCH RBC QN AUTO: 31.1 PG (ref 26.6–33)
MCHC RBC AUTO-ENTMCNC: 34.1 G/DL (ref 31.5–35.7)
MCV RBC AUTO: 91.1 FL (ref 79–97)
MONOCYTES # BLD AUTO: 0.34 10*3/MM3 (ref 0.1–0.9)
MONOCYTES NFR BLD AUTO: 5.3 % (ref 5–12)
NEUTROPHILS NFR BLD AUTO: 3.91 10*3/MM3 (ref 1.7–7)
NEUTROPHILS NFR BLD AUTO: 61.2 % (ref 42.7–76)
NRBC BLD AUTO-RTO: 0 /100 WBC (ref 0–0.2)
NT-PROBNP SERPL-MCNC: 553.6 PG/ML (ref 0–450)
PLATELET # BLD AUTO: 294 10*3/MM3 (ref 140–450)
PMV BLD AUTO: 9.7 FL (ref 6–12)
POTASSIUM SERPL-SCNC: 3.6 MMOL/L (ref 3.5–5.2)
PROT SERPL-MCNC: 7.5 G/DL (ref 6–8.5)
RBC # BLD AUTO: 4.25 10*6/MM3 (ref 3.77–5.28)
SODIUM SERPL-SCNC: 139 MMOL/L (ref 136–145)
STRESS TARGET HR: 150 BPM
TSH SERPL DL<=0.05 MIU/L-ACNC: 2.98 UIU/ML (ref 0.27–4.2)
WBC NRBC COR # BLD: 6.39 10*3/MM3 (ref 3.4–10.8)

## 2022-10-10 PROCEDURE — 93306 TTE W/DOPPLER COMPLETE: CPT

## 2022-10-10 PROCEDURE — 36415 COLL VENOUS BLD VENIPUNCTURE: CPT

## 2022-10-10 PROCEDURE — 93306 TTE W/DOPPLER COMPLETE: CPT | Performed by: INTERNAL MEDICINE

## 2022-10-10 PROCEDURE — 83880 ASSAY OF NATRIURETIC PEPTIDE: CPT

## 2022-10-10 PROCEDURE — 83036 HEMOGLOBIN GLYCOSYLATED A1C: CPT

## 2022-10-10 PROCEDURE — 83735 ASSAY OF MAGNESIUM: CPT

## 2022-10-10 PROCEDURE — 80050 GENERAL HEALTH PANEL: CPT

## 2022-11-10 ENCOUNTER — OFFICE VISIT (OUTPATIENT)
Dept: PULMONOLOGY | Facility: CLINIC | Age: 42
End: 2022-11-10

## 2022-11-10 VITALS
BODY MASS INDEX: 35 KG/M2 | TEMPERATURE: 98.6 F | HEART RATE: 97 BPM | WEIGHT: 205 LBS | DIASTOLIC BLOOD PRESSURE: 86 MMHG | OXYGEN SATURATION: 99 % | HEIGHT: 64 IN | SYSTOLIC BLOOD PRESSURE: 122 MMHG

## 2022-11-10 DIAGNOSIS — J98.6 DIAPHRAGM DYSFUNCTION: ICD-10-CM

## 2022-11-10 DIAGNOSIS — E66.9 OBESITY (BMI 30-39.9): ICD-10-CM

## 2022-11-10 DIAGNOSIS — J70.1 RADIATION-INDUCED PULMONARY FIBROSIS: Primary | ICD-10-CM

## 2022-11-10 PROCEDURE — 99214 OFFICE O/P EST MOD 30 MIN: CPT | Performed by: NURSE PRACTITIONER

## 2022-11-10 RX ORDER — POTASSIUM CHLORIDE 750 MG/1
10 TABLET, EXTENDED RELEASE ORAL DAILY
COMMUNITY
Start: 2022-10-11 | End: 2023-03-29 | Stop reason: SDUPTHER

## 2022-11-10 RX ORDER — SEMAGLUTIDE 1.34 MG/ML
INJECTION, SOLUTION SUBCUTANEOUS
COMMUNITY
Start: 2022-10-12 | End: 2023-03-23

## 2022-11-10 NOTE — PROGRESS NOTES
"Chief Complaint  Radiation-induced pulmonary fibrosis     Subjective        Renate Padmini Mitchell presents to [facility] PULMONARY & CRITICAL CARE MEDICINE  History of Present Illness     Ms. Mitchell is a 42 year old female with a medical history significant for diabetes, depression, hypothyroidism, GERD, hypertension, non hodgkin's lymphoma, radiation induced pulmonary fibrosis, and pulmonary hypertension.      She presents today for follow up on radiation induced pulmonary fibrosis.  She states that over all she has been doing well since her last visit.  She states that today she is having increased shortness of breath and swelling. She does take lasix and another as needed diuretic.  She uses albuterol as needed.        Objective   Vital Signs:  /86 (BP Location: Left arm, Patient Position: Sitting)   Pulse 97   Temp 98.6 °F (37 °C)   Ht 162.6 cm (64\")   Wt 93 kg (205 lb)   SpO2 99%   BMI 35.19 kg/m²   Estimated body mass index is 35.19 kg/m² as calculated from the following:    Height as of this encounter: 162.6 cm (64\").    Weight as of this encounter: 93 kg (205 lb).    Class 2 Severe Obesity (BMI >=35 and <=39.9). Obesity-related health conditions include the following: hypertension, diabetes mellitus, dyslipidemias and GERD. Obesity is improving with lifestyle modifications. BMI is is above average; BMI management plan is completed. We discussed portion control and increasing exercise.      Physical Exam     GENERAL APPEARANCE: Well developed, well nourished, alert and cooperative, and appears to be in no acute distress.    HEAD: normocephalic. Atraumatic.    EYES: PERRL, EOMI. Vision is grossly intact.    THROAT: Oral cavity and pharynx normal. No inflammation, swelling, exudate, or lesions.     NECK: Neck supple.  No thyromegaly.    CARDIAC: Normal S1 and S2. No S3, S4 or murmurs. Rhythm is regular.     RESPIRATORY:Bilateral air entry positive. Bilateral diminished breath " sounds. Worse in the left lower lobe. No wheezing, crackles or rhonchi noted.    GI: Positive bowel sounds. Soft, nondistended, nontender.     MUSCULOSKELETAL: No significant deformity or joint abnormality. No edema. Peripheral pulses intact. No varicosities.    NEUROLOGICAL: Strength and sensation symmetric and intact throughout.     PSYCHIATRIC: The mental examination revealed the patient was oriented to person, place, and time.     Result Review :  The following data was reviewed by: KELLEY Pyle on 11/10/2022:  Common labs    Common Labs 10/10/22 10/10/22 10/10/22    1011 1011 1011   Glucose   109 (A)   BUN   15   Creatinine   0.91   Sodium   139   Potassium   3.6   Chloride   95 (A)   Calcium   9.6   Albumin   4.63   Total Bilirubin   0.5   Alkaline Phosphatase   124 (A)   AST (SGOT)   30   ALT (SGPT)   34 (A)   WBC 6.39     Hemoglobin 13.2     Hematocrit 38.7     Platelets 294     Hemoglobin A1C  5.70 (A)    (A) Abnormal value                     Assessment and Plan   Diagnoses and all orders for this visit:    1. Radiation-induced pulmonary fibrosis (HCC) (Primary)  -     XR Chest 2 View; Future    2. Diaphragm dysfunction  -     XR Chest 2 View; Future    3. Obesity (BMI 30-39.9)          She has chose at this time to not proceed with diaphragm plication surgery.  She continues to make lifestyle changes to lose weight.     Continue albuterol every 4 hours as needed.    Pulmonary hypertension is likely due to group 3.    Ordered chest xray.       Follow Up   Return in about 6 months (around 5/10/2023).  Patient was given instructions and counseling regarding her condition or for health maintenance advice. Please see specific information pulled into the AVS if appropriate.

## 2023-01-04 ENCOUNTER — HOSPITAL ENCOUNTER (OUTPATIENT)
Dept: GENERAL RADIOLOGY | Facility: HOSPITAL | Age: 43
Discharge: HOME | End: 2023-01-04
Admitting: NURSE PRACTITIONER
Payer: COMMERCIAL

## 2023-01-04 DIAGNOSIS — J70.1 RADIATION-INDUCED PULMONARY FIBROSIS: ICD-10-CM

## 2023-01-04 DIAGNOSIS — J98.6 DIAPHRAGM DYSFUNCTION: ICD-10-CM

## 2023-01-04 PROCEDURE — 71046 X-RAY EXAM CHEST 2 VIEWS: CPT

## 2023-01-04 PROCEDURE — 71046 X-RAY EXAM CHEST 2 VIEWS: CPT | Performed by: RADIOLOGY

## 2023-01-06 ENCOUNTER — TELEPHONE (OUTPATIENT)
Dept: PULMONOLOGY | Facility: CLINIC | Age: 43
End: 2023-01-06
Payer: COMMERCIAL

## 2023-01-06 NOTE — TELEPHONE ENCOUNTER
----- Message from KELLEY Pyle sent at 1/5/2023  2:38 PM EST -----  Will you let her know that her chest xray is normal.    ----- Message -----  From: [facility]  Sent: 1/5/2023   8:42 AM EST  To: KELLEY Pyle

## 2023-03-08 ENCOUNTER — OFFICE VISIT (OUTPATIENT)
Dept: PULMONOLOGY | Facility: CLINIC | Age: 43
End: 2023-03-08
Payer: COMMERCIAL

## 2023-03-08 VITALS
HEART RATE: 86 BPM | SYSTOLIC BLOOD PRESSURE: 100 MMHG | BODY MASS INDEX: 33.81 KG/M2 | DIASTOLIC BLOOD PRESSURE: 80 MMHG | RESPIRATION RATE: 18 BRPM | OXYGEN SATURATION: 98 % | TEMPERATURE: 98 F | WEIGHT: 197 LBS

## 2023-03-08 DIAGNOSIS — E66.9 OBESITY (BMI 30-39.9): ICD-10-CM

## 2023-03-08 DIAGNOSIS — J70.1 RADIATION-INDUCED PULMONARY FIBROSIS: ICD-10-CM

## 2023-03-08 DIAGNOSIS — I27.20 PULMONARY HTN: ICD-10-CM

## 2023-03-08 DIAGNOSIS — J18.9 COMMUNITY ACQUIRED PNEUMONIA OF RIGHT LUNG, UNSPECIFIED PART OF LUNG: Primary | ICD-10-CM

## 2023-03-08 DIAGNOSIS — J98.6 DIAPHRAGM DYSFUNCTION: ICD-10-CM

## 2023-03-08 PROCEDURE — 1160F RVW MEDS BY RX/DR IN RCRD: CPT | Performed by: NURSE PRACTITIONER

## 2023-03-08 PROCEDURE — 3079F DIAST BP 80-89 MM HG: CPT | Performed by: NURSE PRACTITIONER

## 2023-03-08 PROCEDURE — 1159F MED LIST DOCD IN RCRD: CPT | Performed by: NURSE PRACTITIONER

## 2023-03-08 PROCEDURE — 99214 OFFICE O/P EST MOD 30 MIN: CPT | Performed by: NURSE PRACTITIONER

## 2023-03-08 PROCEDURE — 3074F SYST BP LT 130 MM HG: CPT | Performed by: NURSE PRACTITIONER

## 2023-03-08 RX ORDER — AMOXICILLIN AND CLAVULANATE POTASSIUM 875; 125 MG/1; MG/1
1 TABLET, FILM COATED ORAL 2 TIMES DAILY
Qty: 14 TABLET | Refills: 0 | Status: SHIPPED | OUTPATIENT
Start: 2023-03-08 | End: 2023-03-23

## 2023-03-08 RX ORDER — IPRATROPIUM BROMIDE AND ALBUTEROL SULFATE 2.5; .5 MG/3ML; MG/3ML
3 SOLUTION RESPIRATORY (INHALATION) 4 TIMES DAILY PRN
Qty: 360 ML | Refills: 3 | Status: SHIPPED | OUTPATIENT
Start: 2023-03-08

## 2023-03-08 NOTE — PROGRESS NOTES
"Chief Complaint  Shortness of Breath    Subjective        Renate Padmini Mitchell presents to [facility] PULMONARY & CRITICAL CARE MEDICINE  History of Present Illness     Ms. Mitchell is a 42 year old female with a medical history significant for diabetes, arthritis, depression, hypothyroidism, GERD, hypertension, migraines, non-hodgkin's lymphoma and pulmonary hypertension.    She presents today shortness of breath.  She tells me that a couple of weeks ago she started to have increased shortness of breath. She reports that she would get dizzy with any exertion. She also tells me that after a few days of symptoms she noticed that her oxygen had dropped to 84%.  She had an appointment with her urologist, who ordered a CT ABD/PEL which showed pneumonia in the middle and lower lobes of the right lung.  She then seen her PCP, who did blood work that showed that her WBC was 6.  He advised her to make an appointment with me for treatment.          Objective   Vital Signs:  /80   Pulse 86   Temp 98 °F (36.7 °C) (Temporal)   Resp 18   Wt 89.4 kg (197 lb)   SpO2 98%   BMI 33.81 kg/m²   Estimated body mass index is 33.81 kg/m² as calculated from the following:    Height as of 11/10/22: 162.6 cm (64\").    Weight as of this encounter: 89.4 kg (197 lb).       BMI is >= 30 and <35. (Class 1 Obesity). The following options were offered after discussion;: exercise counseling/recommendations and nutrition counseling/recommendations      Physical Exam     GENERAL APPEARANCE: Well developed, well nourished, alert and cooperative, and appears to be in no acute distress.    HEAD: normocephalic. Atraumatic.    EYES: PERRL, EOMI. Vision is grossly intact.    THROAT: Oral cavity and pharynx normal. No inflammation, swelling, exudate, or lesions.     NECK: Neck supple.  No thyromegaly.    CARDIAC: Normal S1 and S2. No S3, S4 or murmurs. Rhythm is regular.     RESPIRATORY:Bilateral air entry positive. Bilateral " diffuse rhonchi noted.    GI: Positive bowel sounds. Soft, nondistended, nontender.     MUSCULOSKELETAL: No significant deformity or joint abnormality. No edema. Peripheral pulses intact. No varicosities.    NEUROLOGICAL: Strength and sensation symmetric and intact throughout.     PSYCHIATRIC: The mental examination revealed the patient was oriented to person, place, and time.     Result Review :  The following data was reviewed by: EKLLEY Pyle on 03/08/2023:  Common labs    Common Labs 10/10/22 10/10/22 10/10/22    1011 1011 1011   Glucose   109 (A)   BUN   15   Creatinine   0.91   Sodium   139   Potassium   3.6   Chloride   95 (A)   Calcium   9.6   Albumin   4.63   Total Bilirubin   0.5   Alkaline Phosphatase   124 (A)   AST (SGOT)   30   ALT (SGPT)   34 (A)   WBC 6.39     Hemoglobin 13.2     Hematocrit 38.7     Platelets 294     Hemoglobin A1C  5.70 (A)    (A) Abnormal value                     Assessment and Plan   Diagnoses and all orders for this visit:    1. Community acquired pneumonia of right lung, unspecified part of lung (Primary)  -     Home Nebulizer  -     XR Chest 2 View; Future  -     CBC & Differential; Future    2. Radiation-induced pulmonary fibrosis (HCC)    3. Obesity (BMI 30-39.9)    4. Diaphragm dysfunction    5. Pulmonary HTN (HCC)    Other orders  -     amoxicillin-clavulanate (Augmentin) 875-125 MG per tablet; Take 1 tablet by mouth 2 (Two) Times a Day.  Dispense: 14 tablet; Refill: 0  -     ipratropium-albuterol (DUO-NEB) 0.5-2.5 mg/3 ml nebulizer; Take 3 mL by nebulization 4 (Four) Times a Day As Needed for Wheezing.  Dispense: 360 mL; Refill: 3         CT ABD/PEL was reviewed.  Pneumonia is noted in the right middle and lower lobes.    Will start her on Augmentin.  Sent in order for neb machine and duonebs to use as needed.    She does have oxygen at home she is able to use if needed.    Ordered chest xray and CBC for her to complete in a few weeks after completion of  antibiotics.        Follow Up   Return in about 2 weeks (around 3/22/2023).  Patient was given instructions and counseling regarding her condition or for health maintenance advice. Please see specific information pulled into the AVS if appropriate.

## 2023-03-22 ENCOUNTER — LAB (OUTPATIENT)
Dept: LAB | Facility: HOSPITAL | Age: 43
End: 2023-03-22
Payer: COMMERCIAL

## 2023-03-22 ENCOUNTER — HOSPITAL ENCOUNTER (OUTPATIENT)
Dept: GENERAL RADIOLOGY | Facility: HOSPITAL | Age: 43
Discharge: HOME | End: 2023-03-22
Payer: COMMERCIAL

## 2023-03-22 DIAGNOSIS — J18.9 COMMUNITY ACQUIRED PNEUMONIA OF RIGHT LUNG, UNSPECIFIED PART OF LUNG: ICD-10-CM

## 2023-03-22 PROCEDURE — 36415 COLL VENOUS BLD VENIPUNCTURE: CPT

## 2023-03-22 PROCEDURE — 71046 X-RAY EXAM CHEST 2 VIEWS: CPT

## 2023-03-22 PROCEDURE — 85025 COMPLETE CBC W/AUTO DIFF WBC: CPT

## 2023-03-22 PROCEDURE — 71046 X-RAY EXAM CHEST 2 VIEWS: CPT | Performed by: RADIOLOGY

## 2023-03-23 ENCOUNTER — OFFICE VISIT (OUTPATIENT)
Dept: PULMONOLOGY | Facility: CLINIC | Age: 43
End: 2023-03-23
Payer: COMMERCIAL

## 2023-03-23 VITALS
SYSTOLIC BLOOD PRESSURE: 120 MMHG | WEIGHT: 196 LBS | OXYGEN SATURATION: 98 % | HEART RATE: 93 BPM | TEMPERATURE: 97.5 F | BODY MASS INDEX: 33.46 KG/M2 | HEIGHT: 64 IN | DIASTOLIC BLOOD PRESSURE: 68 MMHG

## 2023-03-23 DIAGNOSIS — J98.6 DIAPHRAGM PARALYSIS: ICD-10-CM

## 2023-03-23 DIAGNOSIS — I27.20 PULMONARY HTN: ICD-10-CM

## 2023-03-23 DIAGNOSIS — E66.9 OBESITY (BMI 30-39.9): ICD-10-CM

## 2023-03-23 DIAGNOSIS — J70.1 RADIATION-INDUCED PULMONARY FIBROSIS: Primary | ICD-10-CM

## 2023-03-23 DIAGNOSIS — J98.6 DIAPHRAGM DYSFUNCTION: ICD-10-CM

## 2023-03-23 LAB
BASOPHILS # BLD AUTO: 0.03 10*3/MM3 (ref 0–0.2)
BASOPHILS NFR BLD AUTO: 0.4 % (ref 0–1.5)
DEPRECATED RDW RBC AUTO: 41.8 FL (ref 37–54)
EOSINOPHIL # BLD AUTO: 0.17 10*3/MM3 (ref 0–0.4)
EOSINOPHIL NFR BLD AUTO: 2.5 % (ref 0.3–6.2)
ERYTHROCYTE [DISTWIDTH] IN BLOOD BY AUTOMATED COUNT: 12.3 % (ref 12.3–15.4)
HCT VFR BLD AUTO: 39.9 % (ref 34–46.6)
HGB BLD-MCNC: 13.4 G/DL (ref 12–15.9)
IMM GRANULOCYTES # BLD AUTO: 0.02 10*3/MM3 (ref 0–0.05)
IMM GRANULOCYTES NFR BLD AUTO: 0.3 % (ref 0–0.5)
LYMPHOCYTES # BLD AUTO: 2.31 10*3/MM3 (ref 0.7–3.1)
LYMPHOCYTES NFR BLD AUTO: 33.5 % (ref 19.6–45.3)
MCH RBC QN AUTO: 31.2 PG (ref 26.6–33)
MCHC RBC AUTO-ENTMCNC: 33.6 G/DL (ref 31.5–35.7)
MCV RBC AUTO: 93 FL (ref 79–97)
MONOCYTES # BLD AUTO: 0.44 10*3/MM3 (ref 0.1–0.9)
MONOCYTES NFR BLD AUTO: 6.4 % (ref 5–12)
NEUTROPHILS NFR BLD AUTO: 3.93 10*3/MM3 (ref 1.7–7)
NEUTROPHILS NFR BLD AUTO: 56.9 % (ref 42.7–76)
NRBC BLD AUTO-RTO: 0 /100 WBC (ref 0–0.2)
PLATELET # BLD AUTO: 274 10*3/MM3 (ref 140–450)
PMV BLD AUTO: 10 FL (ref 6–12)
RBC # BLD AUTO: 4.29 10*6/MM3 (ref 3.77–5.28)
WBC NRBC COR # BLD: 6.9 10*3/MM3 (ref 3.4–10.8)

## 2023-03-23 PROCEDURE — 3078F DIAST BP <80 MM HG: CPT | Performed by: NURSE PRACTITIONER

## 2023-03-23 PROCEDURE — 1160F RVW MEDS BY RX/DR IN RCRD: CPT | Performed by: NURSE PRACTITIONER

## 2023-03-23 PROCEDURE — 3074F SYST BP LT 130 MM HG: CPT | Performed by: NURSE PRACTITIONER

## 2023-03-23 PROCEDURE — 1159F MED LIST DOCD IN RCRD: CPT | Performed by: NURSE PRACTITIONER

## 2023-03-23 PROCEDURE — 99214 OFFICE O/P EST MOD 30 MIN: CPT | Performed by: NURSE PRACTITIONER

## 2023-03-23 NOTE — PROGRESS NOTES
"Chief Complaint  Pneumonia    Subjective        Renate Padmini Mitchell presents to [facility] PULMONARY & CRITICAL CARE MEDICINE  History of Present Illness     Ms. Mitchell is a 42 year old female with a medical history significant for diabetes, hyperlipidemia, arthritis, depression, diabetes, GERD, hypertension, Migraine, and pulmonary hypertension.      She presents today for follow up on pneumonia.  She states that she is feeling better.  Her shortness of breath and cough has improved.  She continue to use albuterol and duonebs as needed.  She also continues to use oxygen when needed.      Objective   Vital Signs:  /68   Pulse 93   Temp 97.5 °F (36.4 °C) (Temporal)   Ht 162.6 cm (64\")   Wt 88.9 kg (196 lb)   SpO2 98%   BMI 33.64 kg/m²   Estimated body mass index is 33.64 kg/m² as calculated from the following:    Height as of this encounter: 162.6 cm (64\").    Weight as of this encounter: 88.9 kg (196 lb).       BMI is >= 30 and <35. (Class 1 Obesity). The following options were offered after discussion;: exercise counseling/recommendations and nutrition counseling/recommendations      Physical Exam     GENERAL APPEARANCE: Well developed, well nourished, alert and cooperative, and appears to be in no acute distress.    HEAD: normocephalic. Atraumatic.    EYES: PERRL, EOMI. Vision is grossly intact.    THROAT: Oral cavity and pharynx normal. No inflammation, swelling, exudate, or lesions.     NECK: Neck supple.  No thyromegaly.    CARDIAC: Normal S1 and S2. No S3, S4 or murmurs. Rhythm is regular.     RESPIRATORY:Bilateral air entry positive. Bilateral diminished breath sounds. No wheezing, crackles or rhonchi noted.    GI: Positive bowel sounds. Soft, nondistended, nontender.     MUSCULOSKELETAL: No significant deformity or joint abnormality. No edema. Peripheral pulses intact. No varicosities.    NEUROLOGICAL: Strength and sensation symmetric and intact throughout.     PSYCHIATRIC: " The mental examination revealed the patient was oriented to person, place, and time.     Result Review :  The following data was reviewed by: KELLEY Pyle on 03/23/2023:  Common labs    Common Labs 10/10/22 10/10/22 10/10/22 3/22/23    1011 1011 1011    Glucose   109 (A)    BUN   15    Creatinine   0.91    Sodium   139    Potassium   3.6    Chloride   95 (A)    Calcium   9.6    Albumin   4.63    Total Bilirubin   0.5    Alkaline Phosphatase   124 (A)    AST (SGOT)   30    ALT (SGPT)   34 (A)    WBC 6.39   6.90   Hemoglobin 13.2   13.4   Hematocrit 38.7   39.9   Platelets 294   274   Hemoglobin A1C  5.70 (A)     (A) Abnormal value                         Assessment and Plan   Diagnoses and all orders for this visit:    1. Radiation-induced pulmonary fibrosis (HCC) (Primary)    2. Obesity (BMI 30-39.9)    3. Diaphragm dysfunction    4. Pulmonary HTN (HCC)    5. Diaphragm paralysis           Chest xray was reviewed and discussed with the patient in detail.    Calcifications were noted to be localized to the anterior mediastinum.  Discussed this with Dr. Owens.  It is believed that this is an area of resolving infections and appear to be consistent with imaging noted on CT Chest that has been done prior.    Continue neb treatments and albuterol inhaler as needed.      Continue supplemental oxygen at night and as needed.      Follow Up   Return in about 6 months (around 9/23/2023).  Patient was given instructions and counseling regarding her condition or for health maintenance advice. Please see specific information pulled into the AVS if appropriate.

## 2023-03-24 PROBLEM — R00.0 TACHYCARDIA: Status: RESOLVED | Noted: 2019-02-26 | Resolved: 2023-03-24

## 2023-03-24 PROBLEM — E78.2 MIXED HYPERLIPIDEMIA: Status: ACTIVE | Noted: 2021-08-30

## 2023-03-24 PROBLEM — G43.019 REFRACTORY MIGRAINE WITHOUT AURA: Status: ACTIVE | Noted: 2023-03-24

## 2023-03-24 PROBLEM — J30.9 ALLERGIC RHINITIS: Status: ACTIVE | Noted: 2023-03-24

## 2023-03-24 PROBLEM — R59.9 ADENOPATHY: Status: RESOLVED | Noted: 2018-11-27 | Resolved: 2023-03-24

## 2023-03-24 PROBLEM — E11.42 DIABETIC PERIPHERAL NEUROPATHY ASSOCIATED WITH TYPE 2 DIABETES MELLITUS: Status: ACTIVE | Noted: 2021-08-30

## 2023-03-24 PROBLEM — E55.9 VITAMIN D DEFICIENCY: Status: ACTIVE | Noted: 2023-03-24

## 2023-03-24 PROBLEM — K21.9 GASTRO-ESOPHAGEAL REFLUX DISEASE WITHOUT ESOPHAGITIS: Status: ACTIVE | Noted: 2021-08-30

## 2023-03-24 PROBLEM — G60.8 OTHER HEREDITARY AND IDIOPATHIC NEUROPATHIES: Status: ACTIVE | Noted: 2023-03-24

## 2023-03-24 PROBLEM — M79.7 FIBROMYALGIA: Status: ACTIVE | Noted: 2021-08-30

## 2023-03-24 PROBLEM — M25.552 LEFT HIP PAIN: Status: RESOLVED | Noted: 2019-05-07 | Resolved: 2023-03-24

## 2023-03-24 PROBLEM — R06.02 SHORTNESS OF BREATH: Status: RESOLVED | Noted: 2022-09-22 | Resolved: 2023-03-24

## 2023-03-24 PROBLEM — E03.9 HYPOTHYROIDISM: Status: ACTIVE | Noted: 2021-08-30

## 2023-03-24 PROBLEM — F41.9 ANXIETY DISORDER: Status: ACTIVE | Noted: 2021-08-30

## 2023-03-24 PROBLEM — R10.32 LLQ ABDOMINAL PAIN: Status: RESOLVED | Noted: 2017-11-21 | Resolved: 2023-03-24

## 2023-03-24 PROBLEM — M54.30 SCIATICA: Status: ACTIVE | Noted: 2020-08-31

## 2023-03-24 PROBLEM — R00.2 PALPITATION: Status: RESOLVED | Noted: 2019-02-26 | Resolved: 2023-03-24

## 2023-03-24 PROBLEM — F33.9 RECURRENT MAJOR DEPRESSION: Status: ACTIVE | Noted: 2021-08-30

## 2023-03-24 PROBLEM — Z85.72 HISTORY OF NON-HODGKIN'S LYMPHOMA: Status: ACTIVE | Noted: 2021-08-30

## 2023-03-24 PROBLEM — D17.20 LIPOMA OF EXTREMITY: Status: RESOLVED | Noted: 2019-05-07 | Resolved: 2023-03-24

## 2023-03-24 PROBLEM — E66.9 OBESITY: Status: ACTIVE | Noted: 2019-05-07

## 2023-03-24 PROBLEM — L20.9 ATOPIC DERMATITIS: Status: ACTIVE | Noted: 2023-03-24

## 2023-03-29 ENCOUNTER — OFFICE VISIT (OUTPATIENT)
Dept: CARDIOLOGY | Facility: CLINIC | Age: 43
End: 2023-03-29
Payer: COMMERCIAL

## 2023-03-29 VITALS
DIASTOLIC BLOOD PRESSURE: 74 MMHG | SYSTOLIC BLOOD PRESSURE: 110 MMHG | HEART RATE: 82 BPM | HEIGHT: 64 IN | WEIGHT: 203 LBS | BODY MASS INDEX: 34.66 KG/M2

## 2023-03-29 DIAGNOSIS — Z85.72 HISTORY OF NON-HODGKIN'S LYMPHOMA: ICD-10-CM

## 2023-03-29 DIAGNOSIS — E66.9 OBESITY (BMI 30.0-34.9): ICD-10-CM

## 2023-03-29 DIAGNOSIS — R00.0 TACHYCARDIA: ICD-10-CM

## 2023-03-29 DIAGNOSIS — R06.09 DYSPNEA ON EXERTION: ICD-10-CM

## 2023-03-29 DIAGNOSIS — R60.0 EDEMA LEG: ICD-10-CM

## 2023-03-29 DIAGNOSIS — E78.2 MIXED HYPERLIPIDEMIA: Primary | ICD-10-CM

## 2023-03-29 DIAGNOSIS — R06.02 SHORTNESS OF BREATH: ICD-10-CM

## 2023-03-29 DIAGNOSIS — I10 PRIMARY HYPERTENSION: ICD-10-CM

## 2023-03-29 DIAGNOSIS — R00.2 PALPITATIONS: ICD-10-CM

## 2023-03-29 RX ORDER — FUROSEMIDE 40 MG/1
40 TABLET ORAL DAILY
Qty: 90 TABLET | Refills: 3 | Status: SHIPPED | OUTPATIENT
Start: 2023-03-29

## 2023-03-29 RX ORDER — POTASSIUM CHLORIDE 750 MG/1
10 TABLET, EXTENDED RELEASE ORAL DAILY
Qty: 90 TABLET | Refills: 3 | Status: SHIPPED | OUTPATIENT
Start: 2023-03-29

## 2023-03-29 RX ORDER — CARVEDILOL 25 MG/1
25 TABLET ORAL 2 TIMES DAILY
Qty: 180 TABLET | Refills: 3 | Status: SHIPPED | OUTPATIENT
Start: 2023-03-29

## 2023-03-29 RX ORDER — SEMAGLUTIDE 2.68 MG/ML
INJECTION, SOLUTION SUBCUTANEOUS
COMMUNITY
Start: 2023-03-22

## 2023-03-29 RX ORDER — METOLAZONE 2.5 MG/1
TABLET ORAL
Qty: 90 TABLET | Refills: 3 | Status: SHIPPED | OUTPATIENT
Start: 2023-03-29

## 2023-03-29 NOTE — PROGRESS NOTES
Chief Complaint   Patient presents with   • Follow-up     Pt is here for cardiac follow up.  Pt denies CP, SOB, dizziness or palpitations.  Pt does not take a daily ASA.     • Med Refill     Pt request 90 day refills to be sent to [facility].  Medications were verified by the pt.       • Lab Work     Pt states their last labs were last week with her pulmologist.         Cardiac Complaints  none      Subjective   Renate Padmini Mitchell is a 42 y.o. female with palpitations, HTN,  diastolic dysfunction, hypothyroidism, and non-Hodgkin's lymphoma, who had chemo and radiation in the past. May 2015 she was seen for pericarditis versus worsening lymphoma.  Work-up showed essentially normal coronaries and mild LV dysfunction.  In 2017 repeat stress was negative for ischemia but showed increased heart rate and blood pressure response.  Echo showed EF had improved to 55%.  Cardiac monitor revealed baseline sinus rhythm with average heart rate of 96 bpm but no arrhythmia.  Beta-blocker dose was increased.  In September 2018 sleep study reported as normal.  Repeat echo and Holter monitor at that time showed similar findings.  Coreg dose increased for better heart rate control. 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 2022 more SOA noted and echo advised that showed EF of 56-60%, grade I diastolic dysfunction and RVSP of 33mmHg similar to prior, current regimen continued.     Patient comes today for follow up and does have SOA, but thinks it is slightly improved from prior after about a 35 pound weight loss. Labs done 10/2022 showed: , AIC 5.7%, TSH 2.980, Na 139, K 3.6, ALT 34, AST 30, GFR 79, HH 13.2/38.7. Labs rechecked about a week ago with pulmonary she admits she had pneumonia recently. Refills requested for 90 day supply.          Cardiac History  Past Surgical History:   Procedure  Laterality Date   • BREAST BIOPSY Right 2019   • CARDIAC BIOPSY     • CARDIAC CATHETERIZATION  05/07/2009    normal but small coronaries, EF 45-50%   • CARDIAC CATHETERIZATION  08/04/2015    essentially normal coronaries. EF 50%   • CARDIAC CATHETERIZATION  10/27/2021    [facility], Dr EDU Haas- normal PA pressure, mild increase PCWP, negative for pulmonary hypertension   • CARDIOVASCULAR STRESS TEST  01/11/2008    A. [facility]- (Dr. Jay). EF 45% at rest, 53% with stress   • CARDIOVASCULAR STRESS TEST  02/10/2009    3 min, 20 sec. 95% THR. bp- 180/98, EF 49%. Negative   • CARDIOVASCULAR STRESS TEST  07/2015    R. Stress- 4 min, 103% THR. 106/68, negative by EKG criteria   • CARDIOVASCULAR STRESS TEST  05/18/2017    4 min 39 sec, 97% THR, 157/76, no obvious ischemia, increase Coreg   • CHOLECYSTECTOMY N/A 03/08/2018    Procedure: CHOLECYSTECTOMY LAPAROSCOPIC;  Surgeon: Artie Oneill MD;  Location:  [facility];  Service:    • COLONOSCOPY N/A 12/22/2017    Procedure: COLONOSCOPY;  Surgeon: Artie Oneill MD;  Location:  [facility];  Service:    • CONVERTED (HISTORICAL) HOLTER  05/23/2017    sinus ave 96 bpm, 1 PVC, 4 PACs   • CONVERTED (HISTORICAL) HOLTER  08/28/2018    Avg 96, one 6 beat SVT   • ECHO - CONVERTED  02/10/2009    EF 50-55%   • ECHO - CONVERTED  07/20/2015    EF 40-45%, RVSP 43 mmHg mild MR   • ECHO - CONVERTED  05/18/2017    EF 50-55%, RVSP 37 mmHg, mild MR   • ECHO - CONVERTED  09/05/2018    TLS. EF 55%. RVSP- 33 mmHg.   • ECHO - CONVERTED  08/25/2020    TLS. EF 60%. Trace-Mild MR. RVSP- 40 mmHg.   • ECHO - CONVERTED  03/29/2021    TLS. EF 55%. Trace-Mild MR & AI. RVSP- 35 mmHg   • ECHO - CONVERTED  10/10/2022    TLS. EF 60%. LA- 3.8. Trace-Mild MR & AI. RVSP- 33 mmHg   • HIP SURGERY Left 2020    lypoma excision   • OTHER SURGICAL HISTORY  08/2015    CT abd/pelvis and chest- essentially unremarkable   • OTHER SURGICAL HISTORY  08/2015    HIDA scan- WNL   • PERICARDIAL WINDOW     • TOTAL LAPAROSCOPIC  HYSTERECTOMY N/A 03/08/2018    Procedure: TOTAL LAPAROSCOPIC HYSTERECTOMY BILATERAL SALPINGECTOMY AND LEFT OOPHORECTOMY WITH DAVINCI SI ROBOT;  Surgeon: Bill Greenwood DO;  Location: [facility];  Service:        Current Outpatient Medications   Medication Sig Dispense Refill   • carvedilol (COREG) 25 MG tablet Take 1 tablet by mouth 2 (Two) Times a Day. 180 tablet 3   • FLUoxetine (PROzac) 40 MG capsule Take 1 capsule by mouth Daily.     • furosemide (LASIX) 40 MG tablet Take 1 tablet by mouth Daily. 90 tablet 3   • HYDROcodone-acetaminophen (NORCO) 7.5-325 MG per tablet Take 1 tablet by mouth 3 (Three) Times a Day.     • ibuprofen (ADVIL,MOTRIN) 800 MG tablet As Needed.     • ipratropium-albuterol (DUO-NEB) 0.5-2.5 mg/3 ml nebulizer Take 3 mL by nebulization 4 (Four) Times a Day As Needed for Wheezing. 360 mL 3   • levothyroxine (SYNTHROID, LEVOTHROID) 50 MCG tablet Take 1 tablet by mouth Daily.     • metOLazone (ZAROXOLYN) 2.5 MG tablet Take 30 minutes to one hour prior to lasix as needed for swelling 90 tablet 3   • omeprazole (priLOSEC) 20 MG capsule Take 1 capsule by mouth Daily.     • Ozempic, 2 MG/DOSE, 8 MG/3ML solution pen-injector INJECT 2 MG SUBCUTANEOUSLY ONCE A WEEK     • potassium chloride (K-DUR,KLOR-CON) 10 MEQ CR tablet Take 1 tablet by mouth Daily. with food. 90 tablet 3   • pregabalin (LYRICA) 150 MG capsule Take 1 capsule by mouth Every Night.     • ProAir  (90 Base) MCG/ACT inhaler INHALE 2 PUFFS BY MOUTH EVERY 4 HOURS AS NEEDED 8.5 g 2   • promethazine (PHENERGAN) 25 MG tablet Take 1 tablet by mouth As Needed.     • SUMAtriptan (IMITREX) 50 MG tablet 1 tablet 1 (One) Time As Needed for Migraine. Take as directed     • tiZANidine (ZANAFLEX) 4 MG tablet Take 2 tablets by mouth Every Night.     • VITAMIN D, CHOLECALCIFEROL, PO Take 5,000 Units by mouth Daily.       No current facility-administered medications for this visit.       Codeine, Cymbalta [duloxetine hcl], Neurontin  [gabapentin], Prednisolone, Sulfa antibiotics, Tramadol hcl, and Tyloxapol    Past Medical History:   Diagnosis Date   • Abnormal uterine bleeding (AUB)    • Arthritis    • Arthritis, lumbar spine    • Chest biopsy    • Chronic idiopathic fibrosing alveolitis (HCC) 2021   • Coronary artery disease 2021   • Depression    • Diabetes mellitus (HCC)    • Diabetic peripheral neuropathy associated with type 2 diabetes mellitus (HCC) 09/22/2022   • Difficulty walking 2008    Due to neuropathy, sometimes I can’t put no weight on my fee   • Disease of thyroid gland    • GERD (gastroesophageal reflux disease)    • H/O exploratory laparotomy    • H/O tubal ligation    • Heart BX , benign    • Heart disease    • History of bone marrow biopsy    • History of chemotherapy    • History of radiation therapy    • HTN (hypertension)     ((Pt Qnr Sub: error))    • Migraine    • Mixed hyperlipidemia 08/30/2021   • Neuropathy     CAUSED BY RADIATIONA ND CHEMO PER PATIENT   • Non Hodgkin's lymphoma (HCC)     very close to heart, in remission, last radiation 2/08, last chemo 12/07   • Pain     LOWER LEFT QUAD   • Pericardial effusion     s/p natty window- removed 398 cc    • Peripheral neuropathy 2008   • Pulmonary HTN (HCC)    • Tachycardia        Social History     Socioeconomic History   • Marital status: Single   Tobacco Use   • Smoking status: Never     Passive exposure: Never   • Smokeless tobacco: Never   Vaping Use   • Vaping Use: Never used   Substance and Sexual Activity   • Alcohol use: No   • Drug use: No   • Sexual activity: Yes     Partners: Male     Birth control/protection: Tubal ligation, Hysterectomy       Family History   Problem Relation Age of Onset   • Heart disease Mother    • Hypertension Mother    • Heart failure Mother    • Hypertension Father    • Hyperlipidemia Father    • Diabetes Father    • Heart disease Child    • Breast cancer Maternal Aunt    • Diabetes Maternal Aunt    • Colon cancer Maternal Uncle    •  "Leukemia Paternal Uncle    • Hodgkin's lymphoma Paternal Uncle    • Lung cancer Paternal Grandfather    • Cancer Paternal Grandfather         Lung Cancer   • Leukemia Maternal Aunt        Review of Systems   Constitutional: Negative for malaise/fatigue and night sweats.   Cardiovascular: Negative for chest pain, claudication, dyspnea on exertion, irregular heartbeat, leg swelling, near-syncope, orthopnea, palpitations and syncope.   Respiratory: Positive for shortness of breath. Negative for cough and wheezing.    Musculoskeletal: Positive for stiffness. Negative for back pain and joint pain.   Gastrointestinal: Negative for anorexia, heartburn, nausea and vomiting.   Genitourinary: Negative for dysuria, hematuria, hesitancy and nocturia.   Neurological: Negative for dizziness, headaches and light-headedness.   Psychiatric/Behavioral: Negative for depression and memory loss. The patient is not nervous/anxious.            Objective     /74 (BP Location: Right arm, Patient Position: Sitting)   Pulse 82   Ht 162.6 cm (64\")   Wt 92.1 kg (203 lb)   LMP 03/05/2018   BMI 34.84 kg/m²     Constitutional:       Appearance: Not in distress.   Eyes:      Pupils: Pupils are equal, round, and reactive to light.   HENT:      Nose: Nose normal.   Pulmonary:      Effort: Pulmonary effort is normal.      Breath sounds: Normal breath sounds.   Cardiovascular:      PMI at left midclavicular line. Normal rate. Regular rhythm.      Murmurs: There is a systolic murmur.   Abdominal:      Palpations: Abdomen is soft.   Musculoskeletal: Normal range of motion.      Cervical back: Normal range of motion and neck supple. Skin:     General: Skin is warm and dry.   Neurological:      Mental Status: Alert.         Procedures         Diagnoses and all orders for this visit:    1. Mixed hyperlipidemia (Primary)    2. Tachycardia  -     carvedilol (COREG) 25 MG tablet; Take 1 tablet by mouth 2 (Two) Times a Day.  Dispense: 180 tablet; " Refill: 3    3. Dyspnea on exertion  -     furosemide (LASIX) 40 MG tablet; Take 1 tablet by mouth Daily.  Dispense: 90 tablet; Refill: 3  -     metOLazone (ZAROXOLYN) 2.5 MG tablet; Take 30 minutes to one hour prior to lasix as needed for swelling  Dispense: 90 tablet; Refill: 3    4. Primary hypertension  -     furosemide (LASIX) 40 MG tablet; Take 1 tablet by mouth Daily.  Dispense: 90 tablet; Refill: 3  -     metOLazone (ZAROXOLYN) 2.5 MG tablet; Take 30 minutes to one hour prior to lasix as needed for swelling  Dispense: 90 tablet; Refill: 3    5. Palpitations    6. Shortness of breath    7. Edema leg    8. History of non-Hodgkin's lymphoma    9. Obesity (BMI 30.0-34.9)    Other orders  -     potassium chloride (K-DUR,KLOR-CON) 10 MEQ CR tablet; Take 1 tablet by mouth Daily. with food.  Dispense: 90 tablet; Refill: 3             SOA/Diastolic dysfunction: Recent echo showed EF of 56-60%,mild diastolic dysfunction noted, BNP mildly elevated, lasix continued daily with zaroxolyn to be used as needed.   She thinks she will be having PFT again soon after weight loss.    Palpitations/HTN: Well managed on current therapy. No adjustment to current coreg therapy. Limited sodium/caffeine intake urged.     Edema: Well managed on current, using lasix therapy. Limited sodium urged.    No repeat workup advised as status stable.     Refills per request.    BMI noted at 34.84, good cardiac ADA diet with limited carbs, calories, and walking regimen advised. Patient praised for weight loss efforts.    6 month follow up advised or sooner if needed.            Problems Addressed this Visit        Cardiac and Vasculature    Mixed hyperlipidemia - Primary       Hematology and Neoplasia    History of non-Hodgkin's lymphoma   Other Visit Diagnoses     Tachycardia        Relevant Medications    carvedilol (COREG) 25 MG tablet    Dyspnea on exertion        Relevant Medications    furosemide (LASIX) 40 MG tablet    metOLazone  (ZAROXOLYN) 2.5 MG tablet    Primary hypertension        Relevant Medications    carvedilol (COREG) 25 MG tablet    furosemide (LASIX) 40 MG tablet    metOLazone (ZAROXOLYN) 2.5 MG tablet    Palpitations        Shortness of breath        Edema leg        Obesity (BMI 30.0-34.9)          Diagnoses       Codes Comments    Mixed hyperlipidemia    -  Primary ICD-10-CM: E78.2  ICD-9-CM: 272.2     Tachycardia     ICD-10-CM: R00.0  ICD-9-CM: 785.0     Dyspnea on exertion     ICD-10-CM: R06.09  ICD-9-CM: 786.09     Primary hypertension     ICD-10-CM: I10  ICD-9-CM: 401.9     Palpitations     ICD-10-CM: R00.2  ICD-9-CM: 785.1     Shortness of breath     ICD-10-CM: R06.02  ICD-9-CM: 786.05     Edema leg     ICD-10-CM: R60.0  ICD-9-CM: 782.3     History of non-Hodgkin's lymphoma     ICD-10-CM: Z85.72  ICD-9-CM: V10.79     Obesity (BMI 30.0-34.9)     ICD-10-CM: E66.9  ICD-9-CM: 278.00                   Electronically signed by KELLEY Blanco March 29, 2023 11:18 EDT

## 2023-09-06 ENCOUNTER — HOSPITAL ENCOUNTER (OUTPATIENT)
Dept: MRI IMAGING | Facility: HOSPITAL | Age: 43
Discharge: HOME | End: 2023-09-06
Admitting: RADIOLOGY
Payer: COMMERCIAL

## 2023-09-06 DIAGNOSIS — Z85.72 HISTORY OF NON-HODGKIN'S LYMPHOMA: ICD-10-CM

## 2023-09-06 LAB — CREAT BLDA-MCNC: 1 MG/DL (ref 0.6–1.3)

## 2023-09-06 PROCEDURE — 0 GADOBENATE DIMEGLUMINE 529 MG/ML SOLUTION: Performed by: RADIOLOGY

## 2023-09-06 PROCEDURE — 82565 ASSAY OF CREATININE: CPT

## 2023-09-06 PROCEDURE — A9577 INJ MULTIHANCE: HCPCS | Performed by: RADIOLOGY

## 2023-09-06 PROCEDURE — 77049 MRI BREAST C-+ W/CAD BI: CPT

## 2023-09-06 RX ADMIN — GADOBENATE DIMEGLUMINE 18 ML: 529 INJECTION, SOLUTION INTRAVENOUS at 13:47

## 2023-09-27 ENCOUNTER — TELEPHONE (OUTPATIENT)
Dept: CARDIOLOGY | Facility: CLINIC | Age: 43
End: 2023-09-27
Payer: COMMERCIAL

## 2023-09-27 NOTE — TELEPHONE ENCOUNTER
Received fax from [facility] for cardiac clearance for patient to have scaling and root planing, restorative dental work, and extractions.According to our records, I do not see where patient is on any blood thinners or has had any stenting.           Fax [phone]

## 2023-10-18 ENCOUNTER — OFFICE VISIT (OUTPATIENT)
Dept: CARDIOLOGY | Facility: CLINIC | Age: 43
End: 2023-10-18
Payer: COMMERCIAL

## 2023-10-18 VITALS
WEIGHT: 194.6 LBS | DIASTOLIC BLOOD PRESSURE: 70 MMHG | HEIGHT: 64 IN | BODY MASS INDEX: 33.22 KG/M2 | HEART RATE: 90 BPM | SYSTOLIC BLOOD PRESSURE: 116 MMHG

## 2023-10-18 DIAGNOSIS — R06.09 DYSPNEA ON EXERTION: ICD-10-CM

## 2023-10-18 DIAGNOSIS — E66.9 OBESITY (BMI 30.0-34.9): ICD-10-CM

## 2023-10-18 DIAGNOSIS — M79.601 PAIN IN RIGHT ARM: ICD-10-CM

## 2023-10-18 DIAGNOSIS — I51.89 DIASTOLIC DYSFUNCTION: ICD-10-CM

## 2023-10-18 DIAGNOSIS — R60.0 EDEMA OF RIGHT UPPER ARM: Primary | ICD-10-CM

## 2023-10-18 DIAGNOSIS — R00.0 TACHYCARDIA: ICD-10-CM

## 2023-10-18 DIAGNOSIS — I10 PRIMARY HYPERTENSION: ICD-10-CM

## 2023-10-18 RX ORDER — POTASSIUM CHLORIDE 750 MG/1
10 TABLET, EXTENDED RELEASE ORAL DAILY
Qty: 90 TABLET | Refills: 3 | Status: SHIPPED | OUTPATIENT
Start: 2023-10-18

## 2023-10-18 RX ORDER — CARVEDILOL 25 MG/1
25 TABLET ORAL 2 TIMES DAILY
Qty: 180 TABLET | Refills: 3 | Status: SHIPPED | OUTPATIENT
Start: 2023-10-18

## 2023-10-18 RX ORDER — FUROSEMIDE 40 MG/1
40 TABLET ORAL DAILY
Qty: 90 TABLET | Refills: 3 | Status: SHIPPED | OUTPATIENT
Start: 2023-10-18

## 2023-10-18 RX ORDER — METOLAZONE 2.5 MG/1
TABLET ORAL
Qty: 90 TABLET | Refills: 3 | Status: SHIPPED | OUTPATIENT
Start: 2023-10-18

## 2023-10-18 NOTE — PROGRESS NOTES
Chief Complaint   Patient presents with    Follow-up     Pt is here for cardiac follow up.  Pt denies CP, SOB, dizziness or palpitations.  She states she has a knot in her arm that is very painful at this time.  She does not take a daily ASA.      Med Refill     Pt request 30 day refills to be sent to [facility].  Medications were verified by the pt.      Lab Work     Pt states their last labs were in July.         Cardiac Complaints  none      Subjective   Renate Padmini Mitchell is a 43 y.o. female with palpitations, HTN,  diastolic dysfunction, hypothyroidism, and non-Hodgkin's lymphoma, who had chemo and radiation in the past. May 2015 she was seen for pericarditis versus worsening lymphoma.  Work-up showed essentially normal coronaries and mild LV dysfunction.  In 2017 repeat stress was negative for ischemia but showed increased heart rate and blood pressure response.  Echo showed EF had improved to 55%.  Cardiac monitor revealed baseline sinus rhythm with average heart rate of 96 bpm but no arrhythmia.  Beta-blocker dose was increased.  In September 2018 sleep study reported as normal.  Repeat echo and Holter monitor at that time showed similar findings.  Coreg dose increased for better heart rate control. 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 2022 more SOA noted and echo advised that showed EF of 56-60%, grade I diastolic dysfunction and RVSP of 33mmHg similar to prior, current regimen continued.      She comes today for follow up and cardiac concerns are denied. No CP, SOA, palpitations, dizziness, or syncope noted. She does admit to a sore in her arm that is very painful. Labs with PCP in July, none available. Refills requested.        Cardiac History  Past Surgical History:   Procedure Laterality Date    BREAST BIOPSY Right 2019    CARDIAC BIOPSY      CARDIAC CATHETERIZATION   05/07/2009    normal but small coronaries, EF 45-50%    CARDIAC CATHETERIZATION  08/04/2015    essentially normal coronaries. EF 50%    CARDIAC CATHETERIZATION  10/27/2021    [facility], Dr EDU Haas- normal PA pressure, mild increase PCWP, negative for pulmonary hypertension    CARDIOVASCULAR STRESS TEST  01/11/2008    [facility]. [facility]- (Dr. Jay). EF 45% at rest, 53% with stress    CARDIOVASCULAR STRESS TEST  02/10/2009    3 min, 20 sec. 95% THR. bp- 180/98, EF 49%. Negative    CARDIOVASCULAR STRESS TEST  07/2015    R. Stress- 4 min, 103% THR. 106/68, negative by EKG criteria    CARDIOVASCULAR STRESS TEST  05/18/2017    4 min 39 sec, 97% THR, 157/76, no obvious ischemia, increase Coreg    CHOLECYSTECTOMY N/A 03/08/2018    Procedure: CHOLECYSTECTOMY LAPAROSCOPIC;  Surgeon: Artie Oneill MD;  Location:  [facility] OR;  Service:     COLONOSCOPY N/A 12/22/2017    Procedure: COLONOSCOPY;  Surgeon: Artie Oneill MD;  Location:  [facility] OR;  Service:     CONVERTED (HISTORICAL) HOLTER  05/23/2017    sinus ave 96 bpm, 1 PVC, 4 PACs    CONVERTED (HISTORICAL) HOLTER  08/28/2018    Avg 96, one 6 beat SVT    DIAPHRAGM PLICATION Left 06/23/2023    ECHO - CONVERTED  02/10/2009    EF 50-55%    ECHO - CONVERTED  07/20/2015    EF 40-45%, RVSP 43 mmHg mild MR    ECHO - CONVERTED  05/18/2017    EF 50-55%, RVSP 37 mmHg, mild MR    ECHO - CONVERTED  09/05/2018    TLS. EF 55%. RVSP- 33 mmHg.    ECHO - CONVERTED  08/25/2020    TLS. EF 60%. Trace-Mild MR. RVSP- 40 mmHg.    ECHO - CONVERTED  03/29/2021    TLS. EF 55%. Trace-Mild MR & AI. RVSP- 35 mmHg    ECHO - CONVERTED  10/10/2022    TLS. EF 60%. LA- 3.8. Trace-Mild MR & AI. RVSP- 33 mmHg    HIP SURGERY Left 2020    lypoma excision    OTHER SURGICAL HISTORY  08/2015    CT abd/pelvis and chest- essentially unremarkable    OTHER SURGICAL HISTORY  08/2015    HIDA scan- WNL    PERICARDIAL WINDOW      TOTAL LAPAROSCOPIC HYSTERECTOMY N/A 03/08/2018    Procedure: TOTAL LAPAROSCOPIC HYSTERECTOMY  BILATERAL SALPINGECTOMY AND LEFT OOPHORECTOMY WITH AardvarkINCI SI ROBOT;  Surgeon: Bill Greenwood DO;  Location: [facility] OR;  Service:        Current Outpatient Medications   Medication Sig Dispense Refill    carvedilol (COREG) 25 MG tablet Take 1 tablet by mouth 2 (Two) Times a Day. 180 tablet 3    FLUoxetine (PROzac) 40 MG capsule Take 1 capsule by mouth Daily.      furosemide (LASIX) 40 MG tablet Take 1 tablet by mouth Daily. 90 tablet 3    HYDROcodone-acetaminophen (NORCO) 7.5-325 MG per tablet Take 1 tablet by mouth 3 (Three) Times a Day.      ibuprofen (ADVIL,MOTRIN) 800 MG tablet As Needed.      levothyroxine (SYNTHROID, LEVOTHROID) 75 MCG tablet Take 1 tablet by mouth Daily.      metOLazone (ZAROXOLYN) 2.5 MG tablet Take 30 minutes to one hour prior to lasix as needed for swelling 90 tablet 3    omeprazole (priLOSEC) 20 MG capsule Take 1 capsule by mouth Daily.      Ozempic, 2 MG/DOSE, 8 MG/3ML solution pen-injector INJECT 2 MG SUBCUTANEOUSLY ONCE A WEEK      potassium chloride (K-DUR,KLOR-CON) 10 MEQ CR tablet Take 1 tablet by mouth Daily. with food. 90 tablet 3    pregabalin (LYRICA) 150 MG capsule Take 1 capsule by mouth Every Night.      ProAir  (90 Base) MCG/ACT inhaler INHALE 2 PUFFS BY MOUTH EVERY 4 HOURS AS NEEDED 8.5 g 2    promethazine (PHENERGAN) 25 MG tablet Take 1 tablet by mouth As Needed.      SUMAtriptan (IMITREX) 50 MG tablet 1 tablet 1 (One) Time As Needed for Migraine. Take as directed      tiZANidine (ZANAFLEX) 4 MG tablet Take 2 tablets by mouth Every Night.      VITAMIN D, CHOLECALCIFEROL, PO Take 5,000 Units by mouth Daily.       No current facility-administered medications for this visit.       Codeine, Cymbalta [duloxetine hcl], Neurontin [gabapentin], Prednisolone, Sulfa antibiotics, Tramadol hcl, and Tyloxapol    Past Medical History:   Diagnosis Date    Abnormal uterine bleeding (AUB)     Arthritis     Arthritis, lumbar spine     Chest biopsy     Chronic idiopathic  fibrosing alveolitis 2021    Coronary artery disease 2021    Depression     Diabetes mellitus     Diabetic peripheral neuropathy associated with type 2 diabetes mellitus 09/22/2022    Difficulty walking 2008    Due to neuropathy, sometimes I can’t put no weight on my fee    Disease of thyroid gland     GERD (gastroesophageal reflux disease)     H/O exploratory laparotomy     H/O tubal ligation     Heart BX , benign     Heart disease     History of bone marrow biopsy     History of chemotherapy     History of radiation therapy     HTN (hypertension)     ((Pt Qnr Sub: error))     Migraine     Mixed hyperlipidemia 08/30/2021    Neuropathy     CAUSED BY RADIATIONA ND CHEMO PER PATIENT    Non Hodgkin's lymphoma     very close to heart, in remission, last radiation 2/08, last chemo 12/07    Pain     LOWER LEFT QUAD    Pericardial effusion     s/p natty window- removed 398 cc     Peripheral neuropathy 2008    Pulmonary HTN     Tachycardia        Social History     Socioeconomic History    Marital status: Single   Tobacco Use    Smoking status: Never     Passive exposure: Never    Smokeless tobacco: Never   Vaping Use    Vaping Use: Never used   Substance and Sexual Activity    Alcohol use: No    Drug use: No    Sexual activity: Yes     Partners: Male     Birth control/protection: Tubal ligation, Hysterectomy       Family History   Problem Relation Age of Onset    Heart disease Mother     Hypertension Mother     Heart failure Mother     Hypertension Father     Hyperlipidemia Father     Diabetes Father     Heart disease Child     Breast cancer Maternal Aunt     Diabetes Maternal Aunt     Colon cancer Maternal Uncle     Leukemia Paternal Uncle     Hodgkin's lymphoma Paternal Uncle     Lung cancer Paternal Grandfather     Cancer Paternal Grandfather         Lung Cancer    Leukemia Maternal Aunt        Review of Systems   Constitutional: Negative for malaise/fatigue and night sweats.   Cardiovascular:  Negative for chest pain,  "claudication, dyspnea on exertion, irregular heartbeat, leg swelling, near-syncope, orthopnea, palpitations and syncope.   Respiratory:  Negative for cough, shortness of breath and wheezing.    Musculoskeletal:  Positive for joint pain and stiffness.   Gastrointestinal:  Negative for anorexia, heartburn, nausea and vomiting.   Genitourinary:  Negative for dysuria, hematuria, hesitancy and nocturia.   Neurological:  Negative for dizziness, headaches and light-headedness.   Psychiatric/Behavioral:  Negative for depression.            Objective     /70 (BP Location: Left arm, Patient Position: Sitting)   Pulse 90   Ht 162.6 cm (64\")   Wt 88.3 kg (194 lb 9.6 oz)   LMP 03/05/2018   BMI 33.40 kg/m²     Constitutional:       Appearance: Not in distress.   Eyes:      Pupils: Pupils are equal, round, and reactive to light.   HENT:      Nose: Nose normal.   Pulmonary:      Effort: Pulmonary effort is normal.      Breath sounds: Normal breath sounds.   Cardiovascular:      PMI at left midclavicular line. Normal rate. Regular rhythm.      Murmurs: There is a systolic murmur.   Abdominal:      Palpations: Abdomen is soft.   Musculoskeletal: Normal range of motion.         General: Tenderness present.      Cervical back: Normal range of motion and neck supple. Skin:     General: Skin is warm and dry.   Neurological:      Mental Status: Alert.         Procedures         Diagnoses and all orders for this visit:    1. Edema of right upper arm (Primary)  -     US Venous Doppler Upper Extremity Right (duplex); Future    2. Tachycardia  -     carvedilol (COREG) 25 MG tablet; Take 1 tablet by mouth 2 (Two) Times a Day.  Dispense: 180 tablet; Refill: 3    3. Dyspnea on exertion  -     furosemide (LASIX) 40 MG tablet; Take 1 tablet by mouth Daily.  Dispense: 90 tablet; Refill: 3  -     metOLazone (ZAROXOLYN) 2.5 MG tablet; Take 30 minutes to one hour prior to lasix as needed for swelling  Dispense: 90 tablet; Refill: 3    4. " Primary hypertension  -     furosemide (LASIX) 40 MG tablet; Take 1 tablet by mouth Daily.  Dispense: 90 tablet; Refill: 3  -     metOLazone (ZAROXOLYN) 2.5 MG tablet; Take 30 minutes to one hour prior to lasix as needed for swelling  Dispense: 90 tablet; Refill: 3    5. Diastolic dysfunction    6. Pain in right arm  -     US Venous Doppler Upper Extremity Right (duplex); Future    7. Obesity (BMI 30.0-34.9)    Other orders  -     potassium chloride (K-DUR,KLOR-CON) 10 MEQ CR tablet; Take 1 tablet by mouth Daily. with food.  Dispense: 90 tablet; Refill: 3               SOA/Diastolic dysfunction: Recent echo showed EF of 56-60%,mild diastolic dysfunction noted, BNP mildly elevated, lasix continued daily with zaroxolyn to be used as needed.      Palpitations/HTN: Well managed on current therapy. No adjustment to current coreg therapy. Limited sodium/caffeine intake urged.      Edema: Well managed on current, using lasix therapy. Limited sodium urged.     Right arm pain/edema: Venous scan of right arm to rule out clot. More recommendations to follow.     Refills per request.     BMI noted at 33.40, good cardiac ADA diet with limited carbs, calories, and walking regimen advised. Patient praised for weight loss efforts. Continues ozempic therapy.     6 month follow up advised or sooner if needed.        Problems Addressed this Visit    None  Visit Diagnoses       Edema of right upper arm    -  Primary    Relevant Orders    US Venous Doppler Upper Extremity Right (duplex)    Tachycardia        Relevant Medications    carvedilol (COREG) 25 MG tablet    Dyspnea on exertion        Relevant Medications    furosemide (LASIX) 40 MG tablet    metOLazone (ZAROXOLYN) 2.5 MG tablet    Primary hypertension        Relevant Medications    carvedilol (COREG) 25 MG tablet    furosemide (LASIX) 40 MG tablet    metOLazone (ZAROXOLYN) 2.5 MG tablet    Diastolic dysfunction        Pain in right arm        Relevant Orders    US Venous  Doppler Upper Extremity Right (duplex)    Obesity (BMI 30.0-34.9)              Diagnoses         Codes Comments    Edema of right upper arm    -  Primary ICD-10-CM: R60.0  ICD-9-CM: 782.3     Tachycardia     ICD-10-CM: R00.0  ICD-9-CM: 785.0     Dyspnea on exertion     ICD-10-CM: R06.09  ICD-9-CM: 786.09     Primary hypertension     ICD-10-CM: I10  ICD-9-CM: 401.9     Diastolic dysfunction     ICD-10-CM: I51.89  ICD-9-CM: 429.9     Pain in right arm     ICD-10-CM: M79.601  ICD-9-CM: 729.5     Obesity (BMI 30.0-34.9)     ICD-10-CM: E66.9  ICD-9-CM: 278.00                                 Electronically signed by Alba Rahman, APRN October 18, 2023 10:19 EDT

## 2023-10-25 ENCOUNTER — OFFICE VISIT (OUTPATIENT)
Dept: PULMONOLOGY | Facility: CLINIC | Age: 43
End: 2023-10-25
Payer: COMMERCIAL

## 2023-10-25 VITALS
TEMPERATURE: 97.9 F | HEIGHT: 64 IN | DIASTOLIC BLOOD PRESSURE: 72 MMHG | BODY MASS INDEX: 32.44 KG/M2 | WEIGHT: 190 LBS | SYSTOLIC BLOOD PRESSURE: 108 MMHG | OXYGEN SATURATION: 99 % | HEART RATE: 94 BPM

## 2023-10-25 DIAGNOSIS — E66.9 OBESITY (BMI 30-39.9): ICD-10-CM

## 2023-10-25 DIAGNOSIS — J70.1 RADIATION-INDUCED PULMONARY FIBROSIS: Primary | ICD-10-CM

## 2023-10-25 DIAGNOSIS — I27.20 PULMONARY HTN: ICD-10-CM

## 2023-10-25 PROCEDURE — 99213 OFFICE O/P EST LOW 20 MIN: CPT | Performed by: NURSE PRACTITIONER

## 2023-10-25 PROCEDURE — 3074F SYST BP LT 130 MM HG: CPT | Performed by: NURSE PRACTITIONER

## 2023-10-25 PROCEDURE — 3078F DIAST BP <80 MM HG: CPT | Performed by: NURSE PRACTITIONER

## 2023-10-25 PROCEDURE — 1159F MED LIST DOCD IN RCRD: CPT | Performed by: NURSE PRACTITIONER

## 2023-10-25 PROCEDURE — 1160F RVW MEDS BY RX/DR IN RCRD: CPT | Performed by: NURSE PRACTITIONER

## 2023-10-25 NOTE — PROGRESS NOTES
"Chief Complaint  Pulmonary Fibrosis  (Post surgical diaphragm tack. )    Subjective        Renate Padmini Mitchell presents to [facility]MON[facility]ICINE  History of Present Illness    Ms. Mitchell is a 43 year old female with a medical history significant for diabetes, arthritis, CAD, GERD, hypertension, and pulmonary hypertension.    She presents today for follow up on pulmonary fibrosis.  She also underwent a diaphragm tack in June at [facility].  She states that she did have some extensive complications during surgery and she was in ICU for a week.  She reports that the surgery did improve her shortness of breath significantly.  She reports minimal shortness of breath.  She is no longer having to use oxygen at night and has not needed her inhalers.         Objective   Vital Signs:  /72 (BP Location: Left arm, Patient Position: Sitting)   Pulse 94   Temp 97.9 °F (36.6 °C)   Ht 162.6 cm (64\")   Wt 86.2 kg (190 lb)   SpO2 99%   BMI 32.61 kg/m²   Estimated body mass index is 32.61 kg/m² as calculated from the following:    Height as of this encounter: 162.6 cm (64\").    Weight as of this encounter: 86.2 kg (190 lb).               Physical Exam     GENERAL APPEARANCE: Well developed, well nourished, alert and cooperative, and appears to be in no acute distress.    HEAD: normocephalic. Atraumatic.    EYES: PERRL, EOMI. Vision is grossly intact.    THROAT: Oral cavity and pharynx normal. No inflammation, swelling, exudate, or lesions.     NECK: Neck supple.  No thyromegaly.    CARDIAC: Normal S1 and S2. No S3, S4 or murmurs. Rhythm is regular.     RESPIRATORY:Bilateral air entry positive. Bilateral diminished breath sounds. No wheezing, crackles or rhonchi noted.    GI: Positive bowel sounds. Soft, nondistended, nontender.     MUSCULOSKELETAL: No significant deformity or joint abnormality. No edema. Peripheral pulses intact. No varicosities.    NEUROLOGICAL: Strength and " sensation symmetric and intact throughout.     PSYCHIATRIC: The mental examination revealed the patient was oriented to person, place, and time.     Result Review :  The following data was reviewed by: KELLEY Pyle on 10/25/2023:  Common labs          6/29/2023    04:34 7/12/2023    19:39 9/6/2023    13:31   Common Labs   Creatinine   1.00    WBC 8.59     9.53        Hemoglobin 11.2     12.5        Hematocrit 33.3     38.1        Platelets 314     975           Details          This result is from an external source.                          Assessment and Plan   Diagnoses and all orders for this visit:    1. Radiation-induced pulmonary fibrosis (Primary)    2. Obesity (BMI 30-39.9)    3. Pulmonary HTN          She is no longer requiring oxygen at night.    She does have albuterol to use as needed but has not had to use it.    Breathing has improved after diaphragm surgery.    Will plan to repeat CT chest in June 2024.    Follow Up   Return in about 1 year (around 10/25/2024).  Patient was given instructions and counseling regarding her condition or for health maintenance advice. Please see specific information pulled into the AVS if appropriate.

## 2024-07-15 ENCOUNTER — OFFICE VISIT (OUTPATIENT)
Dept: GASTROENTEROLOGY | Facility: CLINIC | Age: 44
End: 2024-07-15
Payer: COMMERCIAL

## 2024-07-15 VITALS
DIASTOLIC BLOOD PRESSURE: 87 MMHG | HEIGHT: 64 IN | WEIGHT: 184 LBS | BODY MASS INDEX: 31.41 KG/M2 | SYSTOLIC BLOOD PRESSURE: 116 MMHG | HEART RATE: 77 BPM

## 2024-07-15 DIAGNOSIS — R13.19 ESOPHAGEAL DYSPHAGIA: ICD-10-CM

## 2024-07-15 DIAGNOSIS — D12.6 ADENOMATOUS POLYP OF COLON, UNSPECIFIED PART OF COLON: Primary | ICD-10-CM

## 2024-07-15 RX ORDER — POLYETHYLENE GLYCOL 3350 17 G/17G
POWDER, FOR SOLUTION ORAL
Qty: 510 G | Refills: 0 | Status: SHIPPED | OUTPATIENT
Start: 2024-07-15

## 2024-07-15 NOTE — PROGRESS NOTES
"Date of Consultation:  7/15/2024  Referring Physician: KELLEY Anaya    Chief Complaint  Difficulty Swallowing    Renate Mitchell is a 44 y.o. female who presents today to [facility] for Difficulty Swallowing.    HPI:   44-year-old female with PMH of non-Hodgkin lymphoma, pulmonary fibrosis secondary to radiation treatment, chronic back pain on chronic opioid therapy, hypertension, T2DM who presents today for difficulty swallowing.  Patient states that she gets a \"stuck feeling\" when swallowing pills.  Reports that this sensation can be alleviated with swallowing liquids.  However, she states that it is an uncomfortable feeling as it feels like the liquid is \"pushing down on the pillow.\"  States that she also will get an uncomfortable sensation randomly when she swallows drinks as well.  She reports that this occurs approximately 10-15 times per week.  She does have a history of GERD but states this is well-controlled with Prilosec 20 mg once.  She consumes a lot of fatty, fried, greasy foods.  She generally drinks coffee in the morning.  States that she will \"sip on a Coke.\"  She reports her bowels move every day.  She feels that she evacuates well and does not have to strain.  She denies unintentional weight loss, fever, chills, nausea, vomiting, hematemesis, abdominal pain, changes in bowel habit, constipation, diarrhea, melena, and hematochezia.  She is s/p cholecystectomy.    Of note, patient had a colonoscopy performed by Dr. Oneill in 2018.  This was notable for small polyp and pathology was notable for tubular adenoma.      Previous History:   Past Medical History:   Diagnosis Date    Abnormal uterine bleeding (AUB)     Arthritis     Arthritis, lumbar spine     Chest biopsy     Chronic idiopathic fibrosing alveolitis 2021    Coronary artery disease 2021    Depression     Diabetes mellitus     Diabetic peripheral neuropathy associated with type 2 diabetes " mellitus 09/22/2022    Difficulty walking 2008    Due to neuropathy, sometimes I can’t put no weight on my fee    Disease of thyroid gland     GERD (gastroesophageal reflux disease)     H/O exploratory laparotomy     H/O tubal ligation     Heart BX , benign     Heart disease     History of bone marrow biopsy     History of chemotherapy     History of radiation therapy     HTN (hypertension)     ((Pt Qnr Sub: error))     Migraine     Mixed hyperlipidemia 08/30/2021    Neuropathy     CAUSED BY RADIATIONA ND CHEMO PER PATIENT    Non Hodgkin's lymphoma     very close to heart, in remission, last radiation 2/08, last chemo 12/07    Pain     LOWER LEFT QUAD    Pericardial effusion     s/p natty window- removed 398 cc     Peripheral neuropathy 2008    Pulmonary HTN     Tachycardia       Past Surgical History:   Procedure Laterality Date    BREAST BIOPSY Right 2019    CARDIAC BIOPSY      CARDIAC CATHETERIZATION  05/07/2009    normal but small coronaries, EF 45-50%    CARDIAC CATHETERIZATION  08/04/2015    essentially normal coronaries. EF 50%    CARDIAC CATHETERIZATION  10/27/2021    RH- [facility], Dr EDU Haas- normal PA pressure, mild increase PCWP, negative for pulmonary hypertension    CARDIOVASCULAR STRESS TEST  01/11/2008    A. Cardiolyte- (Dr. Jay). EF 45% at rest, 53% with stress    CARDIOVASCULAR STRESS TEST  02/10/2009    3 min, 20 sec. 95% THR. bp- 180/98, EF 49%. Negative    CARDIOVASCULAR STRESS TEST  07/2015    R. Stress- 4 min, 103% THR. 106/68, negative by EKG criteria    CARDIOVASCULAR STRESS TEST  05/18/2017    4 min 39 sec, 97% THR, 157/76, no obvious ischemia, increase Coreg    CHOLECYSTECTOMY N/A 03/08/2018    Procedure: CHOLECYSTECTOMY LAPAROSCOPIC;  Surgeon: Artie Oneill MD;  Location:  [facility] OR;  Service:     COLONOSCOPY N/A 12/22/2017    Procedure: COLONOSCOPY;  Surgeon: Artie Oneill MD;  Location:  [facility] OR;  Service:     CONVERTED (HISTORICAL) HOLTER  05/23/2017    sinus ave 96 bpm, 1 PVC, 4 PACs     CONVERTED (HISTORICAL) HOLTER  08/28/2018    Avg 96, one 6 beat SVT    DIAPHRAGM PLICATION Left 06/23/2023    ECHO - CONVERTED  02/10/2009    EF 50-55%    ECHO - CONVERTED  07/20/2015    EF 40-45%, RVSP 43 mmHg mild MR    ECHO - CONVERTED  05/18/2017    EF 50-55%, RVSP 37 mmHg, mild MR    ECHO - CONVERTED  09/05/2018    TLS. EF 55%. RVSP- 33 mmHg.    ECHO - CONVERTED  08/25/2020    TLS. EF 60%. Trace-Mild MR. RVSP- 40 mmHg.    ECHO - CONVERTED  03/29/2021    TLS. EF 55%. Trace-Mild MR & AI. RVSP- 35 mmHg    ECHO - CONVERTED  10/10/2022    TLS. EF 60%. LA- 3.8. Trace-Mild MR & AI. RVSP- 33 mmHg    HIP SURGERY Left 2020    lypoma excision    OTHER SURGICAL HISTORY  08/2015    CT abd/pelvis and chest- essentially unremarkable    OTHER SURGICAL HISTORY  08/2015    HIDA scan- WNL    PERICARDIAL WINDOW      TOTAL LAPAROSCOPIC HYSTERECTOMY N/A 03/08/2018    Procedure: TOTAL LAPAROSCOPIC HYSTERECTOMY BILATERAL SALPINGECTOMY AND LEFT OOPHORECTOMY WITH DAVINCI SI ROBOT;  Surgeon: Bill Greenwood DO;  Location: [facility] OR;  Service:       Social History     Socioeconomic History    Marital status: Single   Tobacco Use    Smoking status: Never     Passive exposure: Never    Smokeless tobacco: Never   Vaping Use    Vaping status: Never Used   Substance and Sexual Activity    Alcohol use: No    Drug use: No    Sexual activity: Yes     Partners: Male     Birth control/protection: Tubal ligation, Hysterectomy        Current Medications:  Current Outpatient Medications   Medication Sig Dispense Refill    carvedilol (COREG) 25 MG tablet Take 1 tablet by mouth 2 (Two) Times a Day. 180 tablet 3    FLUoxetine (PROzac) 40 MG capsule Take 1 capsule by mouth Daily.      furosemide (LASIX) 40 MG tablet Take 1 tablet by mouth Daily. 90 tablet 3    HYDROcodone-acetaminophen (NORCO) 7.5-325 MG per tablet Take 1 tablet by mouth 3 (Three) Times a Day.      ibuprofen (ADVIL,MOTRIN) 800 MG tablet As Needed.      levothyroxine (SYNTHROID,  "LEVOTHROID) 75 MCG tablet Take 1 tablet by mouth Daily.      metOLazone (ZAROXOLYN) 2.5 MG tablet Take 30 minutes to one hour prior to lasix as needed for swelling 90 tablet 3    omeprazole (priLOSEC) 20 MG capsule Take 1 capsule by mouth Daily.      potassium chloride (K-DUR,KLOR-CON) 10 MEQ CR tablet Take 1 tablet by mouth Daily. with food. 90 tablet 3    pregabalin (LYRICA) 150 MG capsule Take 1 capsule by mouth Every Night.      ProAir  (90 Base) MCG/ACT inhaler INHALE 2 PUFFS BY MOUTH EVERY 4 HOURS AS NEEDED 8.5 g 2    SUMAtriptan (IMITREX) 50 MG tablet 1 tablet 1 (One) Time As Needed for Migraine. Take as directed      tiZANidine (ZANAFLEX) 4 MG tablet Take 2 tablets by mouth Every Night.      VITAMIN D, CHOLECALCIFEROL, PO Take 5,000 Units by mouth Daily.      polyethylene glycol (MIRALAX) 17 GM/SCOOP powder Take 255 g Miralax mixed with 32 oz clear liquid at 8pm night before procedure then repeat 6 hours prior to arrival to hospital. 510 g 0     No current facility-administered medications for this visit.       Allergies:   Allergies   Allergen Reactions    Codeine Hives     ITCHES    Cymbalta [Duloxetine Hcl] Other (See Comments)     UNABLE TO MOVE MUSCLES    Neurontin [Gabapentin] Other (See Comments)     BLADDER DOESN'T WORK     Prednisolone Nausea Only    Sulfa Antibiotics Other (See Comments)     Causes chills, watery eyes    Tramadol Hcl Rash    Tyloxapol Rash       Vitals:   /87   Pulse 77   Ht 162.6 cm (64\")   Wt 83.5 kg (184 lb)   LMP 03/05/2018   BMI 31.58 kg/m²   Estimated body mass index is 31.58 kg/m² as calculated from the following:    Height as of this encounter: 162.6 cm (64\").    Weight as of this encounter: 83.5 kg (184 lb).    Renate Mitchell  reports that she has never smoked. She has never been exposed to tobacco smoke. She has never used smokeless tobacco. I have educated her on the risk of diseases from using tobacco products such as cancer, COPD, and heart " disease.     ROS:   Review of Systems   Constitutional: Negative.    HENT:  Positive for trouble swallowing.    Respiratory: Negative.     Cardiovascular: Negative.    Gastrointestinal: Negative.    All other systems reviewed and are negative.       Physical Exam:   Physical Exam  Vitals reviewed.   Constitutional:       Appearance: Normal appearance. She is obese.   HENT:      Head: Normocephalic and atraumatic.      Nose: Nose normal.      Mouth/Throat:      Mouth: Mucous membranes are moist.   Eyes:      Extraocular Movements: Extraocular movements intact.   Cardiovascular:      Rate and Rhythm: Normal rate and regular rhythm.      Pulses: Normal pulses.      Heart sounds: Normal heart sounds.   Pulmonary:      Effort: Pulmonary effort is normal.      Breath sounds: Normal breath sounds.   Abdominal:      General: Abdomen is flat. Bowel sounds are normal. There is no distension.      Palpations: Abdomen is soft. There is no mass.      Tenderness: There is no abdominal tenderness. There is no guarding or rebound.      Hernia: No hernia is present.   Musculoskeletal:      Cervical back: Normal range of motion.   Skin:     General: Skin is warm and dry.   Neurological:      Mental Status: She is alert and oriented to person, place, and time.   Psychiatric:         Mood and Affect: Mood normal.         Behavior: Behavior normal.         Thought Content: Thought content normal.          Lab Results:   Lab Results   Component Value Date    WBC 9.53 07/12/2023    HGB 12.5 07/12/2023    HCT 38.1 07/12/2023    MCV 94 07/12/2023    RDW 13.2 07/12/2023     (H) 07/12/2023    NEUTRORELPCT 87.0 07/12/2023    LYMPHORELPCT 10.0 07/12/2023    MONORELPCT 2.0 07/12/2023    EOSRELPCT 0.0 07/12/2023    BASORELPCT 0.0 07/12/2023    NEUTROABS 8.28 (H) 07/12/2023    LYMPHSABS 0.95 (L) 07/12/2023       Lab Results   Component Value Date     06/23/2023    K 3.7 06/24/2023    CO2 31.3 (H) 10/10/2022    CL 95 (L) 06/24/2023     BUN 15 10/10/2022    CREATININE 1.00 09/06/2023    EGFRIFNONA >60 09/07/2021    EGFRIFAFRI >60 09/07/2021    GLUCOSE 145 (H) 06/24/2023    CALCIUM 9.6 10/10/2022    ALKPHOS 124 (H) 10/10/2022    AST 30 10/10/2022    ALT 34 (H) 10/10/2022    BILITOT 0.5 10/10/2022    ALBUMIN 4.63 10/10/2022    PROTEINTOT 7.5 10/10/2022    MG 2.0 06/29/2023       Pathology:        Endoscopy:        Imaging:       Results review: During today's encounter, all relevant clinical data has been reviewed.      Assessment and Plan    1. Adenomatous polyp of colon, unspecified part of colon (Primary)  Patient underwent colonoscopy with  in 2018 that was notable for tubular adenoma polyp.    We will plan to repeat colonoscopy for surveillance purposes.  Patient educated on risk and benefits of procedure.  She verbalizes understanding and is amenable to proceeding.  -     Case Request; Standing  -     Follow Anesthesia Guidelines / Protocol; Standing  -     Obtain Informed Consent; Standing  -     Case Request  -     polyethylene glycol (MIRALAX) 17 GM/SCOOP powder; Take 255 g Miralax mixed with 32 oz clear liquid at 8pm night before procedure then repeat 6 hours prior to arrival to hospital.  Dispense: 510 g; Refill: 0    2. Esophageal dysphagia  Patient reports dysphagia both with liquids and pills.  States that her GERD is well-controlled on omeprazole 20 mg once daily.  Will proceed with esophagram followed by EGD.    Patient verbalized understanding and is amenable to proceeding.  -     Case Request; Standing  -     Follow Anesthesia Guidelines / Protocol; Standing  -     Obtain Informed Consent; Standing  -     Case Request  -     FL ESOPHAGRAM SINGLE CONTRAST; Future      New Medications:   New Medications Ordered This Visit   Medications    polyethylene glycol (MIRALAX) 17 GM/SCOOP powder     Sig: Take 255 g Miralax mixed with 32 oz clear liquid at 8pm night before procedure then repeat 6 hours prior to arrival to hospital.      Dispense:  510 g     Refill:  0       Discontinued Medications:   Medications Discontinued During This Encounter   Medication Reason    Ozempic, 2 MG/DOSE, 8 MG/3ML solution pen-injector *Therapy completed        Visit Diagnoses:    ICD-10-CM ICD-9-CM   1. Adenomatous polyp of colon, unspecified part of colon  D12.6 211.3   2. Esophageal dysphagia  R13.19 787.29            Follow Up:   Return in about 8 weeks (around 9/9/2024).    The patient was in agreement with the plan and all questions were answered to patient's satisfaction.        This document has been electronically signed by Maritza Lauren PA-C   July 15, 2024 14:32 EDT    Dictated Utilizing Dragon Dictation: Part of this note may be an electronic transcription/translation of spoken language to printed text using the Dragon Dictation System.    CC:  KELLEY Anaya Terry, APRN

## 2024-07-17 ENCOUNTER — OFFICE VISIT (OUTPATIENT)
Dept: CARDIOLOGY | Facility: CLINIC | Age: 44
End: 2024-07-17
Payer: COMMERCIAL

## 2024-07-17 VITALS
BODY MASS INDEX: 31.79 KG/M2 | DIASTOLIC BLOOD PRESSURE: 76 MMHG | HEART RATE: 70 BPM | HEIGHT: 64 IN | SYSTOLIC BLOOD PRESSURE: 122 MMHG | WEIGHT: 186.2 LBS

## 2024-07-17 DIAGNOSIS — E66.9 OBESITY (BMI 30.0-34.9): ICD-10-CM

## 2024-07-17 DIAGNOSIS — E78.2 MIXED HYPERLIPIDEMIA: Primary | ICD-10-CM

## 2024-07-17 DIAGNOSIS — R06.09 DYSPNEA ON EXERTION: ICD-10-CM

## 2024-07-17 DIAGNOSIS — I10 PRIMARY HYPERTENSION: ICD-10-CM

## 2024-07-17 DIAGNOSIS — E11.9 TYPE 2 DIABETES MELLITUS WITHOUT COMPLICATION, WITHOUT LONG-TERM CURRENT USE OF INSULIN: ICD-10-CM

## 2024-07-17 DIAGNOSIS — R00.0 TACHYCARDIA: ICD-10-CM

## 2024-07-17 DIAGNOSIS — I51.89 DIASTOLIC DYSFUNCTION: ICD-10-CM

## 2024-07-17 DIAGNOSIS — R00.2 PALPITATIONS: ICD-10-CM

## 2024-07-17 RX ORDER — FUROSEMIDE 40 MG/1
40 TABLET ORAL DAILY
Qty: 90 TABLET | Refills: 3 | Status: SHIPPED | OUTPATIENT
Start: 2024-07-17

## 2024-07-17 RX ORDER — POTASSIUM CHLORIDE 750 MG/1
10 TABLET, EXTENDED RELEASE ORAL DAILY
Qty: 90 TABLET | Refills: 3 | Status: SHIPPED | OUTPATIENT
Start: 2024-07-17

## 2024-07-17 RX ORDER — CARVEDILOL 25 MG/1
25 TABLET ORAL 2 TIMES DAILY
Qty: 180 TABLET | Refills: 3 | Status: SHIPPED | OUTPATIENT
Start: 2024-07-17

## 2024-07-17 RX ORDER — METOLAZONE 2.5 MG/1
TABLET ORAL
Qty: 90 TABLET | Refills: 3 | Status: SHIPPED | OUTPATIENT
Start: 2024-07-17

## 2024-07-17 NOTE — PROGRESS NOTES
Chief Complaint   Patient presents with    Follow-up     Cardiac management. Patient has a stressful year due to traumatic injury to her father and her mother having cancer. She has no cardiac symptoms today.  GI wants to do upper Gi and Colonoscopy per [facility] in [place]     LABS     Had routine labs a few months ago per PCP, patient reports all in normal range .    Med Refill     Needs refills on all cardiac and cholesterol med 90 day supply to [facility]       Cardiac Complaints  none      Subjective   Renate Padmini Mitchell is a 44 y.o. female with palpitations, HTN,  diastolic dysfunction, hypothyroidism, and non-Hodgkin's lymphoma, who had chemo and radiation in the past. May 2015 she was seen for pericarditis versus worsening lymphoma.  Work-up showed essentially normal coronaries and mild LV dysfunction.  In 2017 repeat stress was negative for ischemia but showed increased heart rate and blood pressure response.  Echo showed EF had improved to 55%.  Cardiac monitor revealed baseline sinus rhythm with average heart rate of 96 bpm but no arrhythmia.  Beta-blocker dose was increased.  In September 2018 sleep study reported as normal.  Repeat echo and Holter monitor at that time showed similar findings.  Coreg dose increased for better heart rate control. 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 2022 more SOA noted and echo advised that showed EF of 56-60%, grade I diastolic dysfunction and RVSP of 33mmHg similar to prior, current regimen continued.      She comes today for follow up and new concerns are denied. No CP, SOA, palpitations, dizziness, or syncope noted. She does report being under a lot of stress with the health of her parents. Does report [facility] wanting to do GI workup. Labs with PCP, checked a few months ago, no current available. Refills  requested.          Cardiac History  Past Surgical History:   Procedure Laterality Date    BREAST BIOPSY Right 2019    CARDIAC BIOPSY      CARDIAC CATHETERIZATION  05/07/2009    normal but small coronaries, EF 45-50%    CARDIAC CATHETERIZATION  08/04/2015    essentially normal coronaries. EF 50%    CARDIAC CATHETERIZATION  10/27/2021    [facility], Dr EDU Haas- normal PA pressure, mild increase PCWP, negative for pulmonary hypertension    CARDIOVASCULAR STRESS TEST  01/11/2008    A. [facility]- (Dr. Jay). EF 45% at rest, 53% with stress    CARDIOVASCULAR STRESS TEST  02/10/2009    3 min, 20 sec. 95% THR. bp- 180/98, EF 49%. Negative    CARDIOVASCULAR STRESS TEST  07/2015    R. Stress- 4 min, 103% THR. 106/68, negative by EKG criteria    CARDIOVASCULAR STRESS TEST  05/18/2017    4 min 39 sec, 97% THR, 157/76, no obvious ischemia, increase Coreg    CHOLECYSTECTOMY N/A 03/08/2018    Procedure: CHOLECYSTECTOMY LAPAROSCOPIC;  Surgeon: Artie Oneill MD;  Location:  [facility];  Service:     COLONOSCOPY N/A 12/22/2017    Procedure: COLONOSCOPY;  Surgeon: Artie Oneill MD;  Location:  [facility];  Service:     CONVERTED (HISTORICAL) HOLTER  05/23/2017    sinus ave 96 bpm, 1 PVC, 4 PACs    CONVERTED (HISTORICAL) HOLTER  08/28/2018    Avg 96, one 6 beat SVT    DIAPHRAGM PLICATION Left 06/23/2023    ECHO - CONVERTED  02/10/2009    EF 50-55%    ECHO - CONVERTED  07/20/2015    EF 40-45%, RVSP 43 mmHg mild MR    ECHO - CONVERTED  05/18/2017    EF 50-55%, RVSP 37 mmHg, mild MR    ECHO - CONVERTED  09/05/2018    TLS. EF 55%. RVSP- 33 mmHg.    ECHO - CONVERTED  08/25/2020    TLS. EF 60%. Trace-Mild MR. RVSP- 40 mmHg.    ECHO - CONVERTED  03/29/2021    TLS. EF 55%. Trace-Mild MR & AI. RVSP- 35 mmHg    ECHO - CONVERTED  10/10/2022    TLS. EF 60%. LA- 3.8. Trace-Mild MR & AI. RVSP- 33 mmHg    HIP SURGERY Left 2020    lypoma excision    OTHER SURGICAL HISTORY  08/2015    CT abd/pelvis and chest- essentially unremarkable    OTHER  SURGICAL HISTORY  08/2015    HIDA scan- WNL    PERICARDIAL WINDOW      TOTAL LAPAROSCOPIC HYSTERECTOMY N/A 03/08/2018    Procedure: TOTAL LAPAROSCOPIC HYSTERECTOMY BILATERAL SALPINGECTOMY AND LEFT OOPHORECTOMY WITH DAVINCI SI ROBOT;  Surgeon: Bill Greenwood DO;  Location: [facility];  Service:        Current Outpatient Medications   Medication Sig Dispense Refill    carvedilol (COREG) 25 MG tablet Take 1 tablet by mouth 2 (Two) Times a Day. 180 tablet 3    FLUoxetine (PROzac) 40 MG capsule Take 1 capsule by mouth Daily.      furosemide (LASIX) 40 MG tablet Take 1 tablet by mouth Daily. 90 tablet 3    HYDROcodone-acetaminophen (NORCO) 7.5-325 MG per tablet Take 1 tablet by mouth 3 (Three) Times a Day.      ibuprofen (ADVIL,MOTRIN) 800 MG tablet As Needed.      levothyroxine (SYNTHROID, LEVOTHROID) 75 MCG tablet Take 1 tablet by mouth Daily.      metOLazone (ZAROXOLYN) 2.5 MG tablet Take 30 minutes to one hour prior to lasix as needed for swelling 90 tablet 3    omeprazole (priLOSEC) 20 MG capsule Take 1 capsule by mouth Daily.      polyethylene glycol (MIRALAX) 17 GM/SCOOP powder Take 255 g Miralax mixed with 32 oz clear liquid at 8pm night before procedure then repeat 6 hours prior to arrival to hospital. 510 g 0    potassium chloride (KLOR-CON M10) 10 MEQ CR tablet Take 1 tablet by mouth Daily. with food. 90 tablet 3    pregabalin (LYRICA) 150 MG capsule Take 1 capsule by mouth Every Night.      ProAir  (90 Base) MCG/ACT inhaler INHALE 2 PUFFS BY MOUTH EVERY 4 HOURS AS NEEDED 8.5 g 2    SUMAtriptan (IMITREX) 50 MG tablet 1 tablet 1 (One) Time As Needed for Migraine. Take as directed      Tirzepatide (MOUNJARO) 12.5 MG/0.5ML solution pen-injector pen Inject 0.5 mL under the skin into the appropriate area as directed 1 (One) Time Per Week.      tiZANidine (ZANAFLEX) 4 MG tablet Take 2 tablets by mouth Every Night.      VITAMIN D, CHOLECALCIFEROL, PO Take 5,000 Units by mouth Daily.       No current  facility-administered medications for this visit.       Codeine, Cymbalta [duloxetine hcl], Neurontin [gabapentin], Prednisolone, Sulfa antibiotics, Tramadol hcl, and Tyloxapol    Past Medical History:   Diagnosis Date    Abnormal uterine bleeding (AUB)     Arthritis     Arthritis, lumbar spine     Chest biopsy     Chronic idiopathic fibrosing alveolitis 2021    Coronary artery disease 2021    Depression     Diabetes mellitus     Diabetic peripheral neuropathy associated with type 2 diabetes mellitus 09/22/2022    Difficulty walking 2008    Due to neuropathy, sometimes I can’t put no weight on my fee    Disease of thyroid gland     GERD (gastroesophageal reflux disease)     H/O exploratory laparotomy     H/O tubal ligation     Heart BX , benign     Heart disease     History of bone marrow biopsy     History of chemotherapy     History of radiation therapy     HTN (hypertension)     ((Pt Qnr Sub: error))     Migraine     Mixed hyperlipidemia 08/30/2021    Neuropathy     CAUSED BY RADIATIONA ND CHEMO PER PATIENT    Non Hodgkin's lymphoma     very close to heart, in remission, last radiation 2/08, last chemo 12/07    Pain     LOWER LEFT QUAD    Pericardial effusion     s/p natty window- removed 398 cc     Peripheral neuropathy 2008    Pulmonary HTN     Tachycardia        Social History     Socioeconomic History    Marital status: Single   Tobacco Use    Smoking status: Never     Passive exposure: Never    Smokeless tobacco: Never   Vaping Use    Vaping status: Never Used   Substance and Sexual Activity    Alcohol use: No    Drug use: No    Sexual activity: Yes     Partners: Male     Birth control/protection: Tubal ligation, Hysterectomy       Family History   Problem Relation Age of Onset    Heart disease Mother     Hypertension Mother     Heart failure Mother     Hypertension Father     Hyperlipidemia Father     Diabetes Father     Heart disease Child     Breast cancer Maternal Aunt     Diabetes Maternal Aunt     Colon  "cancer Maternal Uncle     Leukemia Paternal Uncle     Hodgkin's lymphoma Paternal Uncle     Lung cancer Paternal Grandfather     Cancer Paternal Grandfather         Lung Cancer    Leukemia Maternal Aunt        Review of Systems   Constitutional: Negative for malaise/fatigue and night sweats.   Cardiovascular:  Positive for dyspnea on exertion. Negative for chest pain, claudication, irregular heartbeat, leg swelling, near-syncope, orthopnea, palpitations and syncope.   Respiratory:  Positive for shortness of breath. Negative for cough and wheezing.    Musculoskeletal:  Negative for back pain, joint pain and stiffness.   Gastrointestinal:  Negative for anorexia, heartburn, nausea and vomiting.   Genitourinary:  Negative for dysuria, hematuria, hesitancy and nocturia.   Neurological:  Negative for dizziness, headaches and light-headedness.   Psychiatric/Behavioral:  Negative for depression and memory loss. The patient is not nervous/anxious.            Objective     /76 (BP Location: Left arm, Patient Position: Sitting)   Pulse 70   Ht 162.6 cm (64\")   Wt 84.5 kg (186 lb 3.2 oz)   LMP 03/05/2018   BMI 31.96 kg/m²     Constitutional:       Appearance: Not in distress.   Eyes:      Pupils: Pupils are equal, round, and reactive to light.   HENT:      Nose: Nose normal.   Pulmonary:      Effort: Pulmonary effort is normal.      Breath sounds: Normal breath sounds.   Cardiovascular:      PMI at left midclavicular line. Normal rate. Regular rhythm.      Murmurs: There is a systolic murmur.   Abdominal:      Palpations: Abdomen is soft.   Musculoskeletal: Normal range of motion.      Cervical back: Normal range of motion and neck supple. Skin:     General: Skin is warm and dry.   Neurological:      Mental Status: Alert.         Procedures         Diagnoses and all orders for this visit:    1. Mixed hyperlipidemia (Primary)    2. Tachycardia  -     carvedilol (COREG) 25 MG tablet; Take 1 tablet by mouth 2 (Two) " Times a Day.  Dispense: 180 tablet; Refill: 3    3. Primary hypertension  -     furosemide (LASIX) 40 MG tablet; Take 1 tablet by mouth Daily.  Dispense: 90 tablet; Refill: 3  -     metOLazone (ZAROXOLYN) 2.5 MG tablet; Take 30 minutes to one hour prior to lasix as needed for swelling  Dispense: 90 tablet; Refill: 3    4. Palpitations    5. Dyspnea on exertion  -     furosemide (LASIX) 40 MG tablet; Take 1 tablet by mouth Daily.  Dispense: 90 tablet; Refill: 3  -     metOLazone (ZAROXOLYN) 2.5 MG tablet; Take 30 minutes to one hour prior to lasix as needed for swelling  Dispense: 90 tablet; Refill: 3    6. Diastolic dysfunction    7. Type 2 diabetes mellitus without complication, without long-term current use of insulin    8. Obesity (BMI 30.0-34.9)    Other orders  -     potassium chloride (KLOR-CON M10) 10 MEQ CR tablet; Take 1 tablet by mouth Daily. with food.  Dispense: 90 tablet; Refill: 3               SOA/Diastolic dysfunction: :Lasix continued daily with zaroxolyn to be used as needed. SOA denied.     Palpitations/HTN: Well managed on current therapy. No adjustment to current coreg therapy. Limited sodium/caffeine intake urged.      Edema: Well managed on current, using lasix therapy, zaroxolyn as needed. Limited sodium urged.    Labs with your office, can we have for review?      Refills per request.     BMI noted at 31.96, good cardiac ADA diet with limited carbs, calories, and walking regimen advised. Patient praised for weight loss efforts. Continues ozempic therapy.     6 month follow up advised or sooner if needed.               Problems Addressed this Visit          Cardiac and Vasculature    Mixed hyperlipidemia - Primary       Endocrine and Metabolic    Type 2 diabetes mellitus without complication, without long-term current use of insulin    Relevant Medications    Tirzepatide (MOUNJARO) 12.5 MG/0.5ML solution pen-injector pen     Other Visit Diagnoses       Tachycardia        Relevant Medications     carvedilol (COREG) 25 MG tablet    Primary hypertension        Relevant Medications    carvedilol (COREG) 25 MG tablet    furosemide (LASIX) 40 MG tablet    metOLazone (ZAROXOLYN) 2.5 MG tablet    Palpitations        Dyspnea on exertion        Relevant Medications    furosemide (LASIX) 40 MG tablet    metOLazone (ZAROXOLYN) 2.5 MG tablet    Diastolic dysfunction        Obesity (BMI 30.0-34.9)              Diagnoses         Codes Comments    Mixed hyperlipidemia    -  Primary ICD-10-CM: E78.2  ICD-9-CM: 272.2     Tachycardia     ICD-10-CM: R00.0  ICD-9-CM: 785.0     Primary hypertension     ICD-10-CM: I10  ICD-9-CM: 401.9     Palpitations     ICD-10-CM: R00.2  ICD-9-CM: 785.1     Dyspnea on exertion     ICD-10-CM: R06.09  ICD-9-CM: 786.09     Diastolic dysfunction     ICD-10-CM: I51.89  ICD-9-CM: 429.9     Type 2 diabetes mellitus without complication, without long-term current use of insulin     ICD-10-CM: E11.9  ICD-9-CM: 250.00     Obesity (BMI 30.0-34.9)     ICD-10-CM: E66.9  ICD-9-CM: 278.00                                Electronically signed by KELLEY Blanco July 17, 2024 11:17 EDT

## 2024-07-18 ENCOUNTER — PATIENT ROUNDING (BHMG ONLY) (OUTPATIENT)
Dept: GASTROENTEROLOGY | Facility: CLINIC | Age: 44
End: 2024-07-18
Payer: COMMERCIAL

## 2024-07-25 ENCOUNTER — HOSPITAL ENCOUNTER (OUTPATIENT)
Dept: GENERAL RADIOLOGY | Facility: HOSPITAL | Age: 44
Discharge: HOME | End: 2024-07-25
Payer: COMMERCIAL

## 2024-07-25 DIAGNOSIS — R13.19 ESOPHAGEAL DYSPHAGIA: ICD-10-CM

## 2024-07-25 PROCEDURE — 74220 X-RAY XM ESOPHAGUS 1CNTRST: CPT

## 2024-07-25 PROCEDURE — 74220 X-RAY XM ESOPHAGUS 1CNTRST: CPT | Performed by: RADIOLOGY

## 2024-07-31 ENCOUNTER — TELEPHONE (OUTPATIENT)
Dept: GASTROENTEROLOGY | Facility: CLINIC | Age: 44
End: 2024-07-31
Payer: COMMERCIAL

## 2024-07-31 NOTE — TELEPHONE ENCOUNTER
----- Message from Maritza Lauren sent at 7/31/2024  9:15 AM EDT -----  Please let patient know that her esophagram was notable for a low-grade stricture in the upper esophagus. This is a area of narrowing of her esophagus and is thought to be secondary to lymphoma treatment. She also had mild dysmotility or abnormal movements of her esophageal musculature. She also has GERD noted. At this time, continue on prilosec for GERD. Chew your food up into small bites and alternate solid food with liquids. Please keep appointment for EGD as well.

## 2024-08-08 ENCOUNTER — TELEPHONE (OUTPATIENT)
Dept: CARDIOLOGY | Facility: CLINIC | Age: 44
End: 2024-08-08
Payer: COMMERCIAL

## 2024-08-08 NOTE — TELEPHONE ENCOUNTER
Received fax from Dr. Chen for cardiac clearance for patient to have an EGD and a colonoscopy. According to our records, I do not see where patient is on any blood thinners or has had any stenting.          Fax [phone]     Saucerization Excision Additional Text (Leave Blank If You Do Not Want): The margin was drawn around the clinically apparent lesion.  Incisions were then made along these lines, in a tangential fashion, to the appropriate tissue plane and the lesion was extirpated.

## 2024-08-13 NOTE — PATIENT INSTRUCTIONS
Diabetes Mellitus and Food  It is important for you to manage your blood sugar (glucose) level. Your blood glucose level can be greatly affected by what you eat. Eating healthier foods in the appropriate amounts throughout the day at about the same time each day will help you control your blood glucose level. It can also help slow or prevent worsening of your diabetes mellitus. Healthy eating may even help you improve the level of your blood pressure and reach or maintain a healthy weight.  General recommendations for healthful eating and cooking habits include:  · Eating meals and snacks regularly. Avoid going long periods of time without eating to lose weight.  · Eating a diet that consists mainly of plant-based foods, such as fruits, vegetables, nuts, legumes, and whole grains.  · Using low-heat cooking methods, such as baking, instead of high-heat cooking methods, such as deep frying.  Work with your dietitian to make sure you understand how to use the Nutrition Facts information on food labels.  How can food affect me?  Carbohydrates   Carbohydrates affect your blood glucose level more than any other type of food. Your dietitian will help you determine how many carbohydrates to eat at each meal and teach you how to count carbohydrates. Counting carbohydrates is important to keep your blood glucose at a healthy level, especially if you are using insulin or taking certain medicines for diabetes mellitus.  Alcohol   Alcohol can cause sudden decreases in blood glucose (hypoglycemia), especially if you use insulin or take certain medicines for diabetes mellitus. Hypoglycemia can be a life-threatening condition. Symptoms of hypoglycemia (sleepiness, dizziness, and disorientation) are similar to symptoms of having too much alcohol.  If your health care provider has given you approval to drink alcohol, do so in moderation and use the following guidelines:  · Women should not have more than one drink per day, and men  Patient: Yue Malin    Procedure Summary       Date: 08/13/24 Room / Location: [facility] MAIN OR 06 / [facility] MAIN OR    Anesthesia Start: 0734 Anesthesia Stop: 1329    Procedures:       Exploratory laparotomy, total abdominal hysterectomy, bilateral salpingo-oophorectomy, bilateral ureterolysis, bilateral pelvic lymph node dissection, tumor debulking, Argon Beam coagulation of peritoneal nodules, mobilization of splenic flexure,repair of transverse colon mesentery, infracolic, supracolic and splenocolic ligament resection with omentectomy, rectosigmoid colon resection with end to end anastomosis (Abdomen)      LAPAROTOMY SALPINGO OOPHORECTOMY (Bilateral: Abdomen)      COLON RESECTION LEFT (Left: Abdomen) Diagnosis: (Adenocarcinoma)    Surgeons: Binh Choudhary MD Anesthesiologist: Jaret Ferguson MD    Anesthesia Type: general ASA Status: 2            Anesthesia Type: general    Vitals Value Taken Time   /64 08/13/24 1329   Temp 97.7 °F (36.5 °C) 08/13/24 1328   Pulse 82 08/13/24 1330   Resp 24 08/13/24 1330   SpO2 99 % 08/13/24 1330   Vitals shown include unfiled device data.    [facility] AN Post Evaluation:   Patient Evaluated in PACU  Patient Participation: complete - patient participated  Level of Consciousness: awake  Pain Management: adequate  Airway Patency:patent  Dental exam unchanged from preop  Yes    Cardiovascular Status: acceptable  Respiratory Status: acceptable  Postoperative Hydration acceptable  Comments: Patient moving extremities to command.      ALFONSO GALLAGHER, RACHELLE  8/13/2024 1:31 PM   should not have more than two drinks per day. One drink is equal to:  ¨ 12 oz of beer.  ¨ 5 oz of wine.  ¨ 1½ oz of hard liquor.  · Do not drink on an empty stomach.  · Keep yourself hydrated. Have water, diet soda, or unsweetened iced tea.  · Regular soda, juice, and other mixers might contain a lot of carbohydrates and should be counted.  What foods are not recommended?  As you make food choices, it is important to remember that all foods are not the same. Some foods have fewer nutrients per serving than other foods, even though they might have the same number of calories or carbohydrates. It is difficult to get your body what it needs when you eat foods with fewer nutrients. Examples of foods that you should avoid that are high in calories and carbohydrates but low in nutrients include:  · Trans fats (most processed foods list trans fats on the Nutrition Facts label).  · Regular soda.  · Juice.  · Candy.  · Sweets, such as cake, pie, doughnuts, and cookies.  · Fried foods.  What foods can I eat?  Eat nutrient-rich foods, which will nourish your body and keep you healthy. The food you should eat also will depend on several factors, including:  · The calories you need.  · The medicines you take.  · Your weight.  · Your blood glucose level.  · Your blood pressure level.  · Your cholesterol level.  You should eat a variety of foods, including:  · Protein.  ¨ Lean cuts of meat.  ¨ Proteins low in saturated fats, such as fish, egg whites, and beans. Avoid processed meats.  · Fruits and vegetables.  ¨ Fruits and vegetables that may help control blood glucose levels, such as apples, mangoes, and yams.  · Dairy products.  ¨ Choose fat-free or low-fat dairy products, such as milk, yogurt, and cheese.  · Grains, bread, pasta, and rice.  ¨ Choose whole grain products, such as multigrain bread, whole oats, and brown rice. These foods may help control blood pressure.  · Fats.  ¨ Foods containing healthful fats, such as nuts,  avocado, olive oil, canola oil, and fish.  Does everyone with diabetes mellitus have the same meal plan?  Because every person with diabetes mellitus is different, there is not one meal plan that works for everyone. It is very important that you meet with a dietitian who will help you create a meal plan that is just right for you.  This information is not intended to replace advice given to you by your health care provider. Make sure you discuss any questions you have with your health care provider.  Document Released: 09/14/2006 Document Revised: 05/25/2017 Document Reviewed: 11/14/2014  [employer] Patient Education © 2017 [employer]  Calorie Counting for Weight Loss  Calories are units of energy. Your body needs a certain amount of calories from food to keep you going throughout the day. When you eat more calories than your body needs, your body stores the extra calories as fat. When you eat fewer calories than your body needs, your body burns fat to get the energy it needs.  Calorie counting means keeping track of how many calories you eat and drink each day. Calorie counting can be helpful if you need to lose weight. If you make sure to eat fewer calories than your body needs, you should lose weight. Ask your health care provider what a healthy weight is for you.  For calorie counting to work, you will need to eat the right number of calories in a day in order to lose a healthy amount of weight per week. A dietitian can help you determine how many calories you need in a day and will give you suggestions on how to reach your calorie goal.  · A healthy amount of weight to lose per week is usually 1-2 lb (0.5-0.9 kg). This usually means that your daily calorie intake should be reduced by 500-750 calories.  · Eating 1,200 - 1,500 calories per day can help most women lose weight.  · Eating 1,500 - 1,800 calories per day can help most men lose weight.  What is my plan?  My goal is to have __________  calories per day.  If I have this many calories per day, I should lose around __________ pounds per week.  What do I need to know about calorie counting?  In order to meet your daily calorie goal, you will need to:  · Find out how many calories are in each food you would like to eat. Try to do this before you eat.  · Decide how much of the food you plan to eat.  · Write down what you ate and how many calories it had. Doing this is called keeping a food log.  To successfully lose weight, it is important to balance calorie counting with a healthy lifestyle that includes regular activity. Aim for 150 minutes of moderate exercise (such as walking) or 75 minutes of vigorous exercise (such as running) each week.  Where do I find calorie information?     The number of calories in a food can be found on a Nutrition Facts label. If a food does not have a Nutrition Facts label, try to look up the calories online or ask your dietitian for help.  Remember that calories are listed per serving. If you choose to have more than one serving of a food, you will have to multiply the calories per serving by the amount of servings you plan to eat. For example, the label on a package of bread might say that a serving size is 1 slice and that there are 90 calories in a serving. If you eat 1 slice, you will have eaten 90 calories. If you eat 2 slices, you will have eaten 180 calories.  How do I keep a food log?  Immediately after each meal, record the following information in your food log:  · What you ate. Don't forget to include toppings, sauces, and other extras on the food.  · How much you ate. This can be measured in cups, ounces, or number of items.  · How many calories each food and drink had.  · The total number of calories in the meal.  Keep your food log near you, such as in a small notebook in your pocket, or use a mobile pedro or website. Some programs will calculate calories for you and show you how many calories you have left  "for the day to meet your goal.  What are some calorie counting tips?  · Use your calories on foods and drinks that will fill you up and not leave you hungry:  ¨ Some examples of foods that fill you up are nuts and nut butters, vegetables, lean proteins, and high-fiber foods like whole grains. High-fiber foods are foods with more than 5 g fiber per serving.  ¨ Drinks such as sodas, specialty coffee drinks, alcohol, and juices have a lot of calories, yet do not fill you up.  · Eat nutritious foods and avoid empty calories. Empty calories are calories you get from foods or beverages that do not have many vitamins or protein, such as candy, sweets, and soda. It is better to have a nutritious high-calorie food (such as an avocado) than a food with few nutrients (such as a bag of chips).  · Know how many calories are in the foods you eat most often. This will help you calculate calorie counts faster.  · Pay attention to calories in drinks. Low-calorie drinks include water and unsweetened drinks.  · Pay attention to nutrition labels for \"low fat\" or \"fat free\" foods. These foods sometimes have the same amount of calories or more calories than the full fat versions. They also often have added sugar, starch, or salt, to make up for flavor that was removed with the fat.  · Find a way of tracking calories that works for you. Get creative. Try different apps or programs if writing down calories does not work for you.  What are some portion control tips?  · Know how many calories are in a serving. This will help you know how many servings of a certain food you can have.  · Use a measuring cup to measure serving sizes. You could also try weighing out portions on a kitchen scale. With time, you will be able to estimate serving sizes for some foods.  · Take some time to put servings of different foods on your favorite plates, bowls, and cups so you know what a serving looks like.  · Try not to eat straight from a bag or box. Doing " this can lead to overeating. Put the amount you would like to eat in a cup or on a plate to make sure you are eating the right portion.  · Use smaller plates, glasses, and bowls to prevent overeating.  · Try not to multitask (for example, watch TV or use your computer) while eating. If it is time to eat, sit down at a table and enjoy your food. This will help you to know when you are full. It will also help you to be aware of what you are eating and how much you are eating.  What are tips for following this plan?  Reading food labels   · Check the calorie count compared to the serving size. The serving size may be smaller than what you are used to eating.  · Check the source of the calories. Make sure the food you are eating is high in vitamins and protein and low in saturated and trans fats.  Shopping   · Read nutrition labels while you shop. This will help you make healthy decisions before you decide to purchase your food.  · Make a grocery list and stick to it.  Cooking   · Try to cook your favorite foods in a healthier way. For example, try baking instead of frying.  · Use low-fat dairy products.  Meal planning   · Use more fruits and vegetables. Half of your plate should be fruits and vegetables.  · Include lean proteins like poultry and fish.  How do I count calories when eating out?  · Ask for smaller portion sizes.  · Consider sharing an entree and sides instead of getting your own entree.  · If you get your own entree, eat only half. Ask for a box at the beginning of your meal and put the rest of your entree in it so you are not tempted to eat it.  · If calories are listed on the menu, choose the lower calorie options.  · Choose dishes that include vegetables, fruits, whole grains, low-fat dairy products, and lean protein.  · Choose items that are boiled, broiled, grilled, or steamed. Stay away from items that are buttered, battered, fried, or served with cream sauce. Items labeled “crispy” are usually  fried, unless stated otherwise.  · Choose water, low-fat milk, unsweetened iced tea, or other drinks without added sugar. If you want an alcoholic beverage, choose a lower calorie option such as a glass of wine or light beer.  · Ask for dressings, sauces, and syrups on the side. These are usually high in calories, so you should limit the amount you eat.  · If you want a salad, choose a garden salad and ask for grilled meats. Avoid extra toppings like treviño, cheese, or fried items. Ask for the dressing on the side, or ask for olive oil and vinegar or lemon to use as dressing.  · Estimate how many servings of a food you are given. For example, a serving of cooked rice is ½ cup or about the size of half a baseball. Knowing serving sizes will help you be aware of how much food you are eating at restaurants. The list below tells you how big or small some common portion sizes are based on everyday objects:  ¨ 1 oz--4 stacked dice.  ¨ 3 oz--1 deck of cards.  ¨ 1 tsp--1 die.  ¨ 1 Tbsp--½ a ping-pong ball.  ¨ 2 Tbsp--1 ping-pong ball.  ¨ ½ cup--½ baseball.  ¨ 1 cup--1 baseball.  Summary  · Calorie counting means keeping track of how many calories you eat and drink each day. If you eat fewer calories than your body needs, you should lose weight.  · A healthy amount of weight to lose per week is usually 1-2 lb (0.5-0.9 kg). This usually means reducing your daily calorie intake by 500-750 calories.  · The number of calories in a food can be found on a Nutrition Facts label. If a food does not have a Nutrition Facts label, try to look up the calories online or ask your dietitian for help.  · Use your calories on foods and drinks that will fill you up, and not on foods and drinks that will leave you hungry.  · Use smaller plates, glasses, and bowls to prevent overeating.  This information is not intended to replace advice given to you by your health care provider. Make sure you discuss any questions you have with your health care  provider.  Document Released: 12/18/2006 Document Revised: 11/17/2017 Document Reviewed: 11/17/2017  [employer] Interactive Patient Education © 2017 [employer]     [employer]

## 2024-08-16 PROBLEM — D12.6 ADENOMATOUS POLYP OF COLON: Status: ACTIVE | Noted: 2024-07-15

## 2024-08-16 PROBLEM — R13.19 ESOPHAGEAL DYSPHAGIA: Status: ACTIVE | Noted: 2024-07-15

## 2024-08-28 ENCOUNTER — ANESTHESIA (OUTPATIENT)
Dept: PERIOP | Facility: HOSPITAL | Age: 44
End: 2024-08-28
Payer: COMMERCIAL

## 2024-08-28 ENCOUNTER — ANESTHESIA EVENT (OUTPATIENT)
Dept: PERIOP | Facility: HOSPITAL | Age: 44
End: 2024-08-28
Payer: COMMERCIAL

## 2024-08-28 ENCOUNTER — HOSPITAL ENCOUNTER (OUTPATIENT)
Facility: HOSPITAL | Age: 44
Setting detail: HOSPITAL OUTPATIENT SURGERY
Discharge: HOME | End: 2024-08-28
Attending: INTERNAL MEDICINE | Admitting: INTERNAL MEDICINE
Payer: COMMERCIAL

## 2024-08-28 VITALS
WEIGHT: 175 LBS | BODY MASS INDEX: 29.88 KG/M2 | HEIGHT: 64 IN | RESPIRATION RATE: 18 BRPM | TEMPERATURE: 97.3 F | SYSTOLIC BLOOD PRESSURE: 97 MMHG | HEART RATE: 74 BPM | OXYGEN SATURATION: 96 % | DIASTOLIC BLOOD PRESSURE: 74 MMHG

## 2024-08-28 DIAGNOSIS — R13.19 ESOPHAGEAL DYSPHAGIA: ICD-10-CM

## 2024-08-28 DIAGNOSIS — D12.6 ADENOMATOUS POLYP OF COLON, UNSPECIFIED PART OF COLON: ICD-10-CM

## 2024-08-28 PROCEDURE — 25010000002 PROPOFOL 200 MG/20ML EMULSION: Performed by: NURSE ANESTHETIST, CERTIFIED REGISTERED

## 2024-08-28 PROCEDURE — 43239 EGD BIOPSY SINGLE/MULTIPLE: CPT | Performed by: INTERNAL MEDICINE

## 2024-08-28 PROCEDURE — 25010000002 FENTANYL CITRATE (PF) 50 MCG/ML SOLUTION: Performed by: NURSE ANESTHETIST, CERTIFIED REGISTERED

## 2024-08-28 PROCEDURE — 45385 COLONOSCOPY W/LESION REMOVAL: CPT | Performed by: INTERNAL MEDICINE

## 2024-08-28 PROCEDURE — 25810000003 LACTATED RINGERS PER 1000 ML: Performed by: ANESTHESIOLOGY

## 2024-08-28 PROCEDURE — 25010000002 MIDAZOLAM PER 1 MG: Performed by: NURSE ANESTHETIST, CERTIFIED REGISTERED

## 2024-08-28 RX ORDER — KETOROLAC TROMETHAMINE 30 MG/ML
30 INJECTION, SOLUTION INTRAMUSCULAR; INTRAVENOUS EVERY 6 HOURS PRN
Status: DISCONTINUED | OUTPATIENT
Start: 2024-08-28 | End: 2024-08-28 | Stop reason: HOSPADM

## 2024-08-28 RX ORDER — FENTANYL CITRATE 50 UG/ML
50 INJECTION, SOLUTION INTRAMUSCULAR; INTRAVENOUS
Status: DISCONTINUED | OUTPATIENT
Start: 2024-08-28 | End: 2024-08-28 | Stop reason: HOSPADM

## 2024-08-28 RX ORDER — MEPERIDINE HYDROCHLORIDE 25 MG/ML
12.5 INJECTION INTRAMUSCULAR; INTRAVENOUS; SUBCUTANEOUS
Status: DISCONTINUED | OUTPATIENT
Start: 2024-08-28 | End: 2024-08-28 | Stop reason: HOSPADM

## 2024-08-28 RX ORDER — ONDANSETRON 2 MG/ML
4 INJECTION INTRAMUSCULAR; INTRAVENOUS AS NEEDED
Status: DISCONTINUED | OUTPATIENT
Start: 2024-08-28 | End: 2024-08-28 | Stop reason: HOSPADM

## 2024-08-28 RX ORDER — SODIUM CHLORIDE 0.9 % (FLUSH) 0.9 %
10 SYRINGE (ML) INJECTION AS NEEDED
Status: DISCONTINUED | OUTPATIENT
Start: 2024-08-28 | End: 2024-08-28 | Stop reason: HOSPADM

## 2024-08-28 RX ORDER — IPRATROPIUM BROMIDE AND ALBUTEROL SULFATE 2.5; .5 MG/3ML; MG/3ML
3 SOLUTION RESPIRATORY (INHALATION) ONCE AS NEEDED
Status: DISCONTINUED | OUTPATIENT
Start: 2024-08-28 | End: 2024-08-28 | Stop reason: HOSPADM

## 2024-08-28 RX ORDER — MIDAZOLAM HYDROCHLORIDE 1 MG/ML
INJECTION INTRAMUSCULAR; INTRAVENOUS AS NEEDED
Status: DISCONTINUED | OUTPATIENT
Start: 2024-08-28 | End: 2024-08-28 | Stop reason: SURG

## 2024-08-28 RX ORDER — SODIUM CHLORIDE 0.9 % (FLUSH) 0.9 %
10 SYRINGE (ML) INJECTION EVERY 12 HOURS SCHEDULED
Status: DISCONTINUED | OUTPATIENT
Start: 2024-08-28 | End: 2024-08-28 | Stop reason: HOSPADM

## 2024-08-28 RX ORDER — MIDAZOLAM HYDROCHLORIDE 1 MG/ML
1 INJECTION INTRAMUSCULAR; INTRAVENOUS
Status: DISCONTINUED | OUTPATIENT
Start: 2024-08-28 | End: 2024-08-28 | Stop reason: HOSPADM

## 2024-08-28 RX ORDER — SODIUM CHLORIDE 9 MG/ML
40 INJECTION, SOLUTION INTRAVENOUS AS NEEDED
Status: DISCONTINUED | OUTPATIENT
Start: 2024-08-28 | End: 2024-08-28 | Stop reason: HOSPADM

## 2024-08-28 RX ORDER — SODIUM CHLORIDE, SODIUM LACTATE, POTASSIUM CHLORIDE, CALCIUM CHLORIDE 600; 310; 30; 20 MG/100ML; MG/100ML; MG/100ML; MG/100ML
125 INJECTION, SOLUTION INTRAVENOUS ONCE
Status: DISCONTINUED | OUTPATIENT
Start: 2024-08-28 | End: 2024-08-28 | Stop reason: HOSPADM

## 2024-08-28 RX ORDER — FENTANYL CITRATE 50 UG/ML
INJECTION, SOLUTION INTRAMUSCULAR; INTRAVENOUS AS NEEDED
Status: DISCONTINUED | OUTPATIENT
Start: 2024-08-28 | End: 2024-08-28 | Stop reason: SURG

## 2024-08-28 RX ORDER — SODIUM CHLORIDE, SODIUM LACTATE, POTASSIUM CHLORIDE, CALCIUM CHLORIDE 600; 310; 30; 20 MG/100ML; MG/100ML; MG/100ML; MG/100ML
100 INJECTION, SOLUTION INTRAVENOUS ONCE AS NEEDED
Status: DISCONTINUED | OUTPATIENT
Start: 2024-08-28 | End: 2024-08-28 | Stop reason: HOSPADM

## 2024-08-28 RX ORDER — PROPOFOL 10 MG/ML
INJECTION, EMULSION INTRAVENOUS CONTINUOUS PRN
Status: DISCONTINUED | OUTPATIENT
Start: 2024-08-28 | End: 2024-08-28 | Stop reason: SURG

## 2024-08-28 RX ORDER — SODIUM CHLORIDE, SODIUM LACTATE, POTASSIUM CHLORIDE, CALCIUM CHLORIDE 600; 310; 30; 20 MG/100ML; MG/100ML; MG/100ML; MG/100ML
125 INJECTION, SOLUTION INTRAVENOUS ONCE
Status: COMPLETED | OUTPATIENT
Start: 2024-08-28 | End: 2024-08-28

## 2024-08-28 RX ADMIN — FENTANYL CITRATE 100 MCG: 50 INJECTION INTRAMUSCULAR; INTRAVENOUS at 10:21

## 2024-08-28 RX ADMIN — SODIUM CHLORIDE, POTASSIUM CHLORIDE, SODIUM LACTATE AND CALCIUM CHLORIDE: 600; 310; 30; 20 INJECTION, SOLUTION INTRAVENOUS at 10:21

## 2024-08-28 RX ADMIN — MIDAZOLAM HYDROCHLORIDE 2 MG: 1 INJECTION, SOLUTION INTRAMUSCULAR; INTRAVENOUS at 10:21

## 2024-08-28 RX ADMIN — PROPOFOL 200 MCG/KG/MIN: 10 INJECTION, EMULSION INTRAVENOUS at 10:23

## 2024-08-28 NOTE — H&P
[facility] HISTORY AND PHYSICAL    Patient Identification:  Name:  Renate Mitchell  Age:  44 y.o.  Sex:  female  :  1980  MRN:  [id number]   Visit Number:  [id number]  Primary Care Physician:  Anuel Matson APRN       Chief complaint: Dysphagia and personal hx of colon polyps     History of presenting illness:  44 y.o. female w/ PMH of non-Hodgkin lymphoma, pulmonary fibrosis secondary to radiation treatment, chronic back pain on chronic opioid therapy, hypertension, & T2DM who presents for EGD/Colonoscopy. She reports that she has a food globulus sensation with swallowing solid food and sometimes liquids as well. She denies symptoms of acid reflux and reports that this is well controlled on prilosec 20 mg daily. Esophagram performed on 2024 was notable for area of smooth muscle narrowing of the upper thoracic esophagus compatible with low-grade stricture, mild esophageal dysmotility and GERD to level of upper thoracic esophagus. Patient had colon oscopy performed in 2018 w/ Dr. Oneill that was notable for a tubular adenoma. She denies unintentional weight loss, fever, chills, N/V/D, melena and hematochezia.     Review of Systems   HENT:  Positive for trouble swallowing.    Eyes: Negative.    Respiratory: Negative.     Cardiovascular: Negative.    Gastrointestinal:  Negative for abdominal distention, abdominal pain, anal bleeding, blood in stool, constipation, diarrhea, nausea, rectal pain and vomiting.   Musculoskeletal: Negative.    All other systems reviewed and are negative.       Past Medical History:   Diagnosis Date    Abnormal uterine bleeding (AUB)     Arthritis     Arthritis, lumbar spine     Chest biopsy     Chronic idiopathic fibrosing alveolitis     Coronary artery disease     Depression     Diabetes mellitus     Diabetic peripheral neuropathy associated with type 2 diabetes mellitus 2022    Difficulty walking     Due to neuropathy,  sometimes I can’t put no weight on my fee    Disease of thyroid gland     GERD (gastroesophageal reflux disease)     H/O exploratory laparotomy     H/O tubal ligation     Heart BX , benign     Heart disease     History of bone marrow biopsy     History of chemotherapy     History of radiation therapy     HTN (hypertension)     ((Pt Qnr Sub: error))     Migraine     Mixed hyperlipidemia 08/30/2021    Neuropathy     CAUSED BY RADIATIONA ND CHEMO PER PATIENT    Non Hodgkin's lymphoma     very close to heart, in remission, last radiation 2/08, last chemo 12/07    Pain     LOWER LEFT QUAD    Pericardial effusion     s/p natty window- removed 398 cc     Peripheral neuropathy 2008    Pulmonary HTN     Tachycardia      Past Surgical History:   Procedure Laterality Date    BREAST BIOPSY Right 2019    CARDIAC BIOPSY      CARDIAC CATHETERIZATION  05/07/2009    normal but small coronaries, EF 45-50%    CARDIAC CATHETERIZATION  08/04/2015    essentially normal coronaries. EF 50%    CARDIAC CATHETERIZATION  10/27/2021    [facility], Dr EDU Haas- normal PA pressure, mild increase PCWP, negative for pulmonary hypertension    CARDIOVASCULAR STRESS TEST  01/11/2008    A. Cardiolyte- (Dr. Jay). EF 45% at rest, 53% with stress    CARDIOVASCULAR STRESS TEST  02/10/2009    3 min, 20 sec. 95% THR. bp- 180/98, EF 49%. Negative    CARDIOVASCULAR STRESS TEST  07/2015    R. Stress- 4 min, 103% THR. 106/68, negative by EKG criteria    CARDIOVASCULAR STRESS TEST  05/18/2017    4 min 39 sec, 97% THR, 157/76, no obvious ischemia, increase Coreg    CHOLECYSTECTOMY N/A 03/08/2018    Procedure: CHOLECYSTECTOMY LAPAROSCOPIC;  Surgeon: Artie Oneill MD;  Location:  [facility] OR;  Service:     COLONOSCOPY N/A 12/22/2017    Procedure: COLONOSCOPY;  Surgeon: Artie Oneill MD;  Location:  [facility] OR;  Service:     CONVERTED (HISTORICAL) HOLTER  05/23/2017    sinus ave 96 bpm, 1 PVC, 4 PACs    CONVERTED (HISTORICAL) HOLTER  08/28/2018    Avg 96, one 6 beat  SVT    DIAPHRAGM PLICATION Left 06/23/2023    ECHO - CONVERTED  02/10/2009    EF 50-55%    ECHO - CONVERTED  07/20/2015    EF 40-45%, RVSP 43 mmHg mild MR    ECHO - CONVERTED  05/18/2017    EF 50-55%, RVSP 37 mmHg, mild MR    ECHO - CONVERTED  09/05/2018    TLS. EF 55%. RVSP- 33 mmHg.    ECHO - CONVERTED  08/25/2020    TLS. EF 60%. Trace-Mild MR. RVSP- 40 mmHg.    ECHO - CONVERTED  03/29/2021    TLS. EF 55%. Trace-Mild MR & AI. RVSP- 35 mmHg    ECHO - CONVERTED  10/10/2022    TLS. EF 60%. LA- 3.8. Trace-Mild MR & AI. RVSP- 33 mmHg    HIP SURGERY Left 2020    lypoma excision    OTHER SURGICAL HISTORY  08/2015    CT abd/pelvis and chest- essentially unremarkable    OTHER SURGICAL HISTORY  08/2015    HIDA scan- WNL    PERICARDIAL WINDOW      TOTAL LAPAROSCOPIC HYSTERECTOMY N/A 03/08/2018    Procedure: TOTAL LAPAROSCOPIC HYSTERECTOMY BILATERAL SALPINGECTOMY AND LEFT OOPHORECTOMY WITH DAVINCI SI ROBOT;  Surgeon: Bill Greenwood DO;  Location: [facility] OR;  Service:      Family History   Problem Relation Age of Onset    Heart disease Mother     Hypertension Mother     Heart failure Mother     Hypertension Father     Hyperlipidemia Father     Diabetes Father     Heart disease Child     Breast cancer Maternal Aunt     Diabetes Maternal Aunt     Colon cancer Maternal Uncle     Leukemia Paternal Uncle     Hodgkin's lymphoma Paternal Uncle     Lung cancer Paternal Grandfather     Cancer Paternal Grandfather         Lung Cancer    Leukemia Maternal Aunt      Social History     Socioeconomic History    Marital status: Single   Tobacco Use    Smoking status: Never     Passive exposure: Never    Smokeless tobacco: Never   Vaping Use    Vaping status: Never Used   Substance and Sexual Activity    Alcohol use: No    Drug use: No    Sexual activity: Yes     Partners: Male     Birth control/protection: Tubal ligation, Hysterectomy       Allergies:  Codeine, Cymbalta [duloxetine hcl], Neurontin [gabapentin], Prednisolone, Sulfa  antibiotics, Tramadol hcl, and Tyloxapol    Prior to Admission Medications       Prescriptions Last Dose Informant Patient Reported? Taking?    carvedilol (COREG) 25 MG tablet 8/27/2024  No Yes    Take 1 tablet by mouth 2 (Two) Times a Day.    FLUoxetine (PROzac) 40 MG capsule 8/27/2024 Self Yes Yes    Take 1 capsule by mouth Daily.    furosemide (LASIX) 40 MG tablet 8/27/2024  No Yes    Take 1 tablet by mouth Daily.    HYDROcodone-acetaminophen (NORCO) 7.5-325 MG per tablet 8/27/2024  Yes Yes    Take 1 tablet by mouth 3 (Three) Times a Day.    ibuprofen (ADVIL,MOTRIN) 800 MG tablet Past Week  Yes Yes    As Needed.    levothyroxine (SYNTHROID, LEVOTHROID) 75 MCG tablet 8/27/2024  Yes Yes    Take 1 tablet by mouth Daily.    metOLazone (ZAROXOLYN) 2.5 MG tablet Past Week  No Yes    Take 30 minutes to one hour prior to lasix as needed for swelling    omeprazole (priLOSEC) 20 MG capsule 8/27/2024  Yes Yes    Take 1 capsule by mouth Daily.    polyethylene glycol (MIRALAX) 17 GM/SCOOP powder 8/27/2024  No Yes    Take 255 g Miralax mixed with 32 oz clear liquid at 8pm night before procedure then repeat 6 hours prior to arrival to hospital.    potassium chloride (KLOR-CON M10) 10 MEQ CR tablet Past Week  No Yes    Take 1 tablet by mouth Daily. with food.    pregabalin (LYRICA) 150 MG capsule 8/27/2024 Medication Bottle Yes Yes    Take 1 capsule by mouth Every Night.    ProAir  (90 Base) MCG/ACT inhaler More than a month  No No    INHALE 2 PUFFS BY MOUTH EVERY 4 HOURS AS NEEDED    SUMAtriptan (IMITREX) 50 MG tablet Past Week Self Yes Yes    1 tablet 1 (One) Time As Needed for Migraine. Take as directed    Tirzepatide (MOUNJARO) 12.5 MG/0.5ML solution pen-injector pen 8/13/2024 Self Yes No    Inject 0.5 mL under the skin into the appropriate area as directed 1 (One) Time Per Week.    tiZANidine (ZANAFLEX) 4 MG tablet 8/27/2024 Self Yes Yes    Take 2 tablets by mouth Every Night.    VITAMIN D, CHOLECALCIFEROL, PO  "8/27/2024  Yes Yes    Take 5,000 Units by mouth Daily.          Hospital Scheduled Meds:  lactated ringers, 125 mL/hr, Intravenous, Once  lactated ringers, 125 mL/hr, Intravenous, Once  sodium chloride, 10 mL, Intravenous, Q12H  sodium chloride, 10 mL, Intravenous, Q12H           Vital Signs:  Temp:  [98 °F (36.7 °C)] 98 °F (36.7 °C)  Resp:  [18] 18      08/28/24  0926   Weight: 79.4 kg (175 lb)     Body mass index is 30.04 kg/m².    Physical Exam:  Constitutional:  Alert and oriented. Well developed and well nourished, in no acute distress.  HENT:  Head: Normocephalic and atraumatic.  Mouth:  Moist mucous membranes.  OP clear, mmm  Eyes:  Conjunctivae and EOM are normal.  Pupils are equal, round, and reactive to light.  No scleral icterus.  Neck:  Neck supple.  No JVD present.    Cardiovascular:  RRR, no MRG.  Pulmonary/Chest:  CTAB, unlabored.   Abdominal:  Soft.  Bowel sounds are normal.  No distension and no tenderness.   Psychiatric:  Normal mood and affect.  Behavior is normal.  Judgment and thought content normal.                   Invalid input(s): \"PROT\"CrCl cannot be calculated (Patient's most recent lab result is older than the maximum 30 days allowed.).  No results found for: \"AMMONIA\"          Lab Results   Component Value Date    HGBA1C 5.1 06/23/2023     Lab Results   Component Value Date    TSH 2.980 10/10/2022    FREET4 1.2 09/07/2021     No results found for: \"PREGTESTUR\", \"PREGSERUM\", \"HCG\", \"HCGQUANT\"  Pain Management Panel           No data to display              No results found for: \"BLOODCX\"  No results found for: \"URINECX\"  No results found for: \"WOUNDCX\"  No results found for: \"STOOLCX\"        Imaging Results (Last 7 Days)       ** No results found for the last 168 hours. **              Assessment and Plan:  1. Proceed with EGD d/t dysphagia and esophagram findings of low-grade stricture.  2. Proceed with Colonoscopy d/t personal hx of colon polyps.     Maritza Lauren " RAVIN  08/28/24  09:36 EDT

## 2024-08-28 NOTE — ANESTHESIA POSTPROCEDURE EVALUATION
Patient: Renate Mitchell    Procedure Summary       Date: 08/28/24 Room / Location: [facility] OR  /  [facility] OR    Anesthesia Start: 1021 Anesthesia Stop: 1100    Procedures:       ESOPHAGOGASTRODUODENOSCOPY WITH BIOPSY (Esophagus)      COLONOSCOPY FOR SCREENING Diagnosis:       Adenomatous polyp of colon, unspecified part of colon      Esophageal dysphagia      (Adenomatous polyp of colon, unspecified part of colon [D12.6])      (Esophageal dysphagia [R13.19])    Surgeons: Cherelle Rothman MD Provider: Chito Rosen MD    Anesthesia Type: general ASA Status: 3            Anesthesia Type: general    Vitals  Vitals Value Taken Time   BP 97/74 08/28/24 1132   Temp 97.3 °F (36.3 °C) 08/28/24 1102   Pulse 74 08/28/24 1132   Resp 18 08/28/24 1132   SpO2 96 % 08/28/24 1132           Post Anesthesia Care and Evaluation    Patient location during evaluation: PACU  Patient participation: complete - patient participated  Level of consciousness: awake  Pain score: 1  Pain management: adequate    Airway patency: patent  Anesthetic complications: No anesthetic complications  PONV Status: controlled  Cardiovascular status: acceptable and blood pressure returned to baseline  Respiratory status: acceptable and room air  Hydration status: acceptable    Comments: Patient comfortable with discharge at this time.

## 2024-08-28 NOTE — ANESTHESIA PREPROCEDURE EVALUATION
Anesthesia Evaluation     Patient summary reviewed and Nursing notes reviewed   no history of anesthetic complications:   NPO Solid Status: > 8 hours  NPO Liquid Status: > 8 hours           Airway   Mallampati: III  TM distance: >3 FB  Neck ROM: full  no difficulty expected  Dental - normal exam     Pulmonary - negative pulmonary ROS and normal exam   (-) not a smoker  Cardiovascular - normal exam    ECG reviewed  Patient on routine beta blocker and Beta blocker given within 24 hours of surgery    (+) hypertension, CAD, pericardial effusion, hyperlipidemia    ROS comment: Cardiac clearance from Dr. Arevalo in [place]    Neuro/Psych  (+) headaches, numbness, psychiatric history Anxiety and Depression  GI/Hepatic/Renal/Endo    (+) obesity, morbid obesity, GERD, diabetes mellitus type 2, thyroid problem hypothyroidism    Musculoskeletal     Abdominal  - normal exam    Bowel sounds: normal.   Substance History - negative use     OB/GYN negative ob/gyn ROS         Other   arthritis,   history of cancer remission    ROS/Med Hx Other: Hx Non Hodgkins lymphoma s/p radiation and chemo                Anesthesia Plan    ASA 3     general     intravenous induction     Anesthetic plan, risks, benefits, and alternatives have been provided, discussed and informed consent has been obtained with: patient.    Plan discussed with CRNA.

## 2024-08-29 LAB — REF LAB TEST METHOD: NORMAL

## 2024-08-29 RX ORDER — PANTOPRAZOLE SODIUM 40 MG/1
40 TABLET, DELAYED RELEASE ORAL DAILY
Qty: 90 TABLET | Refills: 3 | Status: SHIPPED | OUTPATIENT
Start: 2024-08-29

## 2024-08-29 NOTE — PROGRESS NOTES
At the time of her recent upper endoscopy, biopsies were taken of the esophagus.  Biopsies revealed mild chronic esophagitis from acid reflux.  I am going to switch your omeprazole 20 mg to Protonix 40 mg daily.  Biopsies of your stomach were negative for H. pylori gastritis.  The polyps found in the stomach were benign fundic gland polyps.  During your colonoscopy,  polyp was removed.  The polyp was a tubular adenoma.  Tubular adenomas are considered precancerous.  You will need repeat colonoscopy in 5 years due to personal history of colon polyps.  Please keep your follow-up appointment.

## 2024-09-09 ENCOUNTER — HOSPITAL ENCOUNTER (OUTPATIENT)
Dept: MRI IMAGING | Facility: HOSPITAL | Age: 44
Discharge: HOME | End: 2024-09-09
Admitting: RADIOLOGY
Payer: COMMERCIAL

## 2024-09-09 DIAGNOSIS — Z85.72 PERSONAL HISTORY OF MALIGNANT LYMPHOMA: ICD-10-CM

## 2024-09-09 LAB — CREAT BLDA-MCNC: 0.9 MG/DL (ref 0.6–1.3)

## 2024-09-09 PROCEDURE — 0 GADOBENATE DIMEGLUMINE 529 MG/ML SOLUTION: Performed by: RADIOLOGY

## 2024-09-09 PROCEDURE — 82565 ASSAY OF CREATININE: CPT

## 2024-09-09 PROCEDURE — 77049 MRI BREAST C-+ W/CAD BI: CPT

## 2024-09-09 PROCEDURE — A9577 INJ MULTIHANCE: HCPCS | Performed by: RADIOLOGY

## 2024-09-09 RX ADMIN — GADOBENATE DIMEGLUMINE 15 ML: 529 INJECTION, SOLUTION INTRAVENOUS at 11:15

## 2024-10-22 ENCOUNTER — OFFICE VISIT (OUTPATIENT)
Dept: GASTROENTEROLOGY | Facility: CLINIC | Age: 44
End: 2024-10-22
Payer: COMMERCIAL

## 2024-10-22 VITALS
BODY MASS INDEX: 28.68 KG/M2 | SYSTOLIC BLOOD PRESSURE: 109 MMHG | HEIGHT: 64 IN | WEIGHT: 168 LBS | HEART RATE: 85 BPM | DIASTOLIC BLOOD PRESSURE: 73 MMHG

## 2024-10-22 DIAGNOSIS — D12.6 ADENOMATOUS POLYP OF COLON, UNSPECIFIED PART OF COLON: ICD-10-CM

## 2024-10-22 DIAGNOSIS — R13.19 ESOPHAGEAL DYSPHAGIA: Primary | ICD-10-CM

## 2024-10-22 NOTE — PROGRESS NOTES
"Chief Complaint  EGD/Colonoscopy follow up    Renate Mitchell is a 44 y.o. female who presents today to [facility] for EGD/Colonoscopy follow up.    HPI:   44-year-old female with PMH of non-Hodgkin lymphoma, pulmonary fibrosis secondary to radiation treatment, chronic back pain on chronic opioid therapy, hypertension, T2DM who presents today for difficulty swallowing.  Patient states that she gets a \"stuck feeling\" when swallowing pills.  Reports that this sensation can be alleviated with swallowing liquids.  However, she states that it is an uncomfortable feeling as it feels like the liquid is \"pushing down on the pill.\"  States that she also will get an uncomfortable sensation randomly when she swallows drinks as well.  She reports that this occurs approximately 10-15 times per week.  She does have a history of GERD but states this is well-controlled with Prilosec 20 mg once.  She consumes a lot of fatty, fried, greasy foods.  She generally drinks coffee in the morning.  States that she will \"sip on a Coke.\"  She reports her bowels move every day.  She feels that she evacuates well and does not have to strain.  She denies unintentional weight loss, fever, chills, nausea, vomiting, hematemesis, abdominal pain, changes in bowel habit, constipation, diarrhea, melena, and hematochezia.  She is s/p cholecystectomy.     Of note, patient had a colonoscopy performed by Dr. Oneill in 2018.  This was notable for small polyp and pathology was notable for tubular adenoma.    EGD/colonoscopy performed on 8/28/2024 by Dr. Chen is notable for normal esophagus, small hiatal hernia, normal stomach, normal duodenum, multiple sessile polyps in the gastric fundus and gastric body.  Colonoscopy was notable for 7 mm polyp in the ascending colon, internal hemorrhoids, terminal ileum normal.  Repeat colonoscopy in 5 years for surveillance.  Stomach polyp biopsies notable for 9 fundic " gland polyps with mild chronic inflammation, no intestinal metaplasia or dysplasia, no H. pylori.  Esophageal biopsy notable for mild chronic esophagitis.  Colon polyp was notable for fragments of tubular adenoma.           Interval History:    Today, patient reports that she is having ongoing dysphagia.  States that she will have that stuck sensation after eating solid foods or taking her medications.  This happens approximately 10-15 times per week.  Dr. Chen did note an EGD report that this is likely secondary to dysmotility.  She reports that her acid reflux is well-controlled on Protonix 40 mg once daily.  She reports having a bowel movement regularly without excessive straining.  She denies abdominal pain, nausea, vomiting, unintentional weight loss, changes in bowel habits, melena, and hematochezia.      Previous History:   Past Medical History:   Diagnosis Date    Abnormal uterine bleeding (AUB)     Arthritis     Arthritis, lumbar spine     Chest biopsy     Chronic idiopathic fibrosing alveolitis 2021    Coronary artery disease 2021    Depression     Diabetes mellitus     Diabetic peripheral neuropathy associated with type 2 diabetes mellitus 09/22/2022    Difficulty walking 2008    Due to neuropathy, sometimes I can’t put no weight on my fee    Disease of thyroid gland     GERD (gastroesophageal reflux disease)     H/O exploratory laparotomy     H/O tubal ligation     Heart BX , benign     Heart disease     History of bone marrow biopsy     History of chemotherapy     History of radiation therapy     HTN (hypertension)     ((Pt Qnr Sub: error))     Migraine     Mixed hyperlipidemia 08/30/2021    Neuropathy     CAUSED BY RADIATIONA ND CHEMO PER PATIENT    Non Hodgkin's lymphoma     very close to heart, in remission, last radiation 2/08, last chemo 12/07    Pain     LOWER LEFT QUAD    Pericardial effusion     s/p natty window- removed 398 cc     Peripheral neuropathy 2008    Pulmonary HTN     Tachycardia        Past Surgical History:   Procedure Laterality Date    BREAST BIOPSY Right 2019    CARDIAC BIOPSY      CARDIAC CATHETERIZATION  05/07/2009    normal but small coronaries, EF 45-50%    CARDIAC CATHETERIZATION  08/04/2015    essentially normal coronaries. EF 50%    CARDIAC CATHETERIZATION  10/27/2021    [facility], Dr EDU Haas- normal PA pressure, mild increase PCWP, negative for pulmonary hypertension    CARDIOVASCULAR STRESS TEST  01/11/2008    A. Cardiolyte- (Dr. Jay). EF 45% at rest, 53% with stress    CARDIOVASCULAR STRESS TEST  02/10/2009    3 min, 20 sec. 95% THR. bp- 180/98, EF 49%. Negative    CARDIOVASCULAR STRESS TEST  07/2015    R. Stress- 4 min, 103% THR. 106/68, negative by EKG criteria    CARDIOVASCULAR STRESS TEST  05/18/2017    4 min 39 sec, 97% THR, 157/76, no obvious ischemia, increase Coreg    CHOLECYSTECTOMY N/A 03/08/2018    Procedure: CHOLECYSTECTOMY LAPAROSCOPIC;  Surgeon: Artie Oneill MD;  Location:  [facility] OR;  Service:     COLONOSCOPY N/A 12/22/2017    Procedure: COLONOSCOPY;  Surgeon: Artie Oneill MD;  Location:  [facility] OR;  Service:     COLONOSCOPY N/A 8/28/2024    Procedure: COLONOSCOPY FOR SCREENING;  Surgeon: Cherelle Rothman MD;  Location: [facility] OR;  Service: Gastroenterology;  Laterality: N/A;    CONVERTED (HISTORICAL) HOLTER  05/23/2017    sinus ave 96 bpm, 1 PVC, 4 PACs    CONVERTED (HISTORICAL) HOLTER  08/28/2018    Avg 96, one 6 beat SVT    DIAPHRAGM PLICATION Left 06/23/2023    ECHO - CONVERTED  02/10/2009    EF 50-55%    ECHO - CONVERTED  07/20/2015    EF 40-45%, RVSP 43 mmHg mild MR    ECHO - CONVERTED  05/18/2017    EF 50-55%, RVSP 37 mmHg, mild MR    ECHO - CONVERTED  09/05/2018    TLS. EF 55%. RVSP- 33 mmHg.    ECHO - CONVERTED  08/25/2020    TLS. EF 60%. Trace-Mild MR. RVSP- 40 mmHg.    ECHO - CONVERTED  03/29/2021    TLS. EF 55%. Trace-Mild MR & AI. RVSP- 35 mmHg    ECHO - CONVERTED  10/10/2022    TLS. EF 60%. LA- 3.8. Trace-Mild MR & AI. RVSP- 33 mmHg     ENDOSCOPY N/A 8/28/2024    Procedure: ESOPHAGOGASTRODUODENOSCOPY WITH BIOPSY;  Surgeon: Cherelle Rothman MD;  Location: [facility] OR;  Service: Gastroenterology;  Laterality: N/A;    HIP SURGERY Left 2020    lypoma excision    OTHER SURGICAL HISTORY  08/2015    CT abd/pelvis and chest- essentially unremarkable    OTHER SURGICAL HISTORY  08/2015    HIDA scan- WNL    PERICARDIAL WINDOW      TOTAL LAPAROSCOPIC HYSTERECTOMY N/A 03/08/2018    Procedure: TOTAL LAPAROSCOPIC HYSTERECTOMY BILATERAL SALPINGECTOMY AND LEFT OOPHORECTOMY WITH DAVINCI SI ROBOT;  Surgeon: Bill Greenwood DO;  Location: [facility] OR;  Service:       Social History     Socioeconomic History    Marital status: Single   Tobacco Use    Smoking status: Never     Passive exposure: Never    Smokeless tobacco: Never   Vaping Use    Vaping status: Never Used   Substance and Sexual Activity    Alcohol use: No    Drug use: No    Sexual activity: Yes     Partners: Male     Birth control/protection: Tubal ligation, Hysterectomy        Current Medications:  Current Outpatient Medications   Medication Sig Dispense Refill    carvedilol (COREG) 25 MG tablet Take 1 tablet by mouth 2 (Two) Times a Day. 180 tablet 3    FLUoxetine (PROzac) 40 MG capsule Take 1 capsule by mouth Daily.      furosemide (LASIX) 40 MG tablet Take 1 tablet by mouth Daily. 90 tablet 3    HYDROcodone-acetaminophen (NORCO) 7.5-325 MG per tablet Take 1 tablet by mouth 3 (Three) Times a Day.      ibuprofen (ADVIL,MOTRIN) 800 MG tablet As Needed.      levothyroxine (SYNTHROID, LEVOTHROID) 75 MCG tablet Take 1 tablet by mouth Daily.      metOLazone (ZAROXOLYN) 2.5 MG tablet Take 30 minutes to one hour prior to lasix as needed for swelling 90 tablet 3    pantoprazole (PROTONIX) 40 MG EC tablet Take 1 tablet by mouth Daily. 90 tablet 3    polyethylene glycol (MIRALAX) 17 GM/SCOOP powder Take 255 g Miralax mixed with 32 oz clear liquid at 8pm night before procedure then repeat 6 hours  "prior to arrival to hospital. 510 g 0    potassium chloride (KLOR-CON M10) 10 MEQ CR tablet Take 1 tablet by mouth Daily. with food. 90 tablet 3    pregabalin (LYRICA) 150 MG capsule Take 1 capsule by mouth Every Night.      ProAir  (90 Base) MCG/ACT inhaler INHALE 2 PUFFS BY MOUTH EVERY 4 HOURS AS NEEDED 8.5 g 2    SUMAtriptan (IMITREX) 50 MG tablet 1 tablet 1 (One) Time As Needed for Migraine. Take as directed      Tirzepatide (MOUNJARO) 12.5 MG/0.5ML solution pen-injector pen Inject 0.5 mL under the skin into the appropriate area as directed 1 (One) Time Per Week.      tiZANidine (ZANAFLEX) 4 MG tablet Take 2 tablets by mouth Every Night.      VITAMIN D, CHOLECALCIFEROL, PO Take 5,000 Units by mouth Daily.       No current facility-administered medications for this visit.       Allergies:   Allergies   Allergen Reactions    Codeine Hives     ITCHES    Cymbalta [Duloxetine Hcl] Other (See Comments)     UNABLE TO MOVE MUSCLES    Neurontin [Gabapentin] Other (See Comments)     BLADDER DOESN'T WORK     Prednisolone Nausea Only    Sulfa Antibiotics Other (See Comments)     Causes chills, watery eyes    Tramadol Hcl Rash    Tyloxapol Rash       Vitals:   /73   Pulse 85   Ht 162.6 cm (64\")   Wt 76.2 kg (168 lb)   LMP 03/05/2018   BMI 28.84 kg/m²   Estimated body mass index is 28.84 kg/m² as calculated from the following:    Height as of this encounter: 162.6 cm (64\").    Weight as of this encounter: 76.2 kg (168 lb).    ROS:   Review of Systems   Constitutional: Negative.    HENT:  Positive for trouble swallowing.    Respiratory: Negative.     Cardiovascular: Negative.    Gastrointestinal: Negative.    Musculoskeletal: Negative.    All other systems reviewed and are negative.       Physical Exam:   Physical Exam  Vitals reviewed.   Constitutional:       General: She is not in acute distress.     Appearance: She is well-groomed. She is not toxic-appearing.   HENT:      Head: Normocephalic and " atraumatic.      Mouth/Throat:      Mouth: Mucous membranes are moist.   Eyes:      Extraocular Movements: Extraocular movements intact.   Cardiovascular:      Rate and Rhythm: Normal rate and regular rhythm.      Heart sounds: Normal heart sounds. No murmur heard.  Pulmonary:      Effort: Pulmonary effort is normal. No respiratory distress.      Breath sounds: Normal breath sounds. No stridor. No wheezing or rales.   Abdominal:      General: Abdomen is flat. Bowel sounds are normal. There is no distension.      Palpations: Abdomen is soft. There is no mass.      Tenderness: There is no abdominal tenderness. There is no guarding or rebound.      Hernia: No hernia is present.   Skin:     General: Skin is warm.      Coloration: Skin is not jaundiced.      Findings: No rash.   Neurological:      Mental Status: She is alert and oriented to person, place, and time.   Psychiatric:         Mood and Affect: Mood and affect normal.         Speech: Speech normal.         Behavior: Behavior normal. Behavior is cooperative.         Thought Content: Thought content normal.          Lab Results:   Lab Results   Component Value Date    WBC 9.53 07/12/2023    HGB 12.5 07/12/2023    HCT 38.1 07/12/2023    MCV 94 07/12/2023    RDW 13.2 07/12/2023     (H) 07/12/2023    NEUTRORELPCT 87.0 07/12/2023    LYMPHORELPCT 10.0 07/12/2023    MONORELPCT 2.0 07/12/2023    EOSRELPCT 0.0 07/12/2023    BASORELPCT 0.0 07/12/2023    NEUTROABS 8.28 (H) 07/12/2023    LYMPHSABS 0.95 (L) 07/12/2023       Lab Results   Component Value Date     06/23/2023    K 3.7 06/24/2023    CO2 31.3 (H) 10/10/2022    CL 95 (L) 06/24/2023    BUN 15 10/10/2022    CREATININE 0.90 09/09/2024    EGFRIFNONA >60 09/07/2021    EGFRIFAFRI >60 09/07/2021    GLUCOSE 145 (H) 06/24/2023    CALCIUM 9.6 10/10/2022    ALKPHOS 124 (H) 10/10/2022    AST 30 10/10/2022    ALT 34 (H) 10/10/2022    BILITOT 0.5 10/10/2022    ALBUMIN 4.63 10/10/2022    PROTEINTOT 7.5 10/10/2022     MG 2.0 06/29/2023       Pathology:        Endoscopy:        Imaging:   MRI Breast Bilateral Diagnostic W WO Contrast    Result Date: 9/10/2024  Benign bilateral breast MRI findings.  RECOMMENDATIONS: Recommend the patient continue annual screening mammography and annual screening breast MRI.  MRI BI-RADS CATEGORY: 2, BENIGN    This report was finalized on 9/10/2024 9:08 AM by Dr. Betty Moctezuma MD.      FL ESOPHAGRAM SINGLE CONTRAST    Result Date: 7/25/2024  1.  No irregular esophageal stricture is identified. 2.   imaging demonstrates mediastinal calcifications which would correspond to patient's treated lymphoma. 3.  Area of smooth minimal narrowing of the upper thoracic esophagus without mucosal irregularity compatible with a benign appearing low-grade stricture. Given location, this is likely related to patient's radiation therapy in this region. Barium tablet passes freely across the area of narrowing with no delay. 4.  Esophageal tertiary contractions noted with mild dysmotility. 5.  Gastroesophageal reflux noted to level of upper thoracic esophagus.   This report was finalized on 7/25/2024 8:57 AM by Dr. Gama Blair MD.         Results review: During today's encounter, all relevant clinical data has been reviewed.      Assessment and Plan  1. Esophageal dysphagia (Primary)  Please continue Protonix 40 mg once daily.  Discussed with patient referring to Dr. Bishop for esophageal manometry and further evaluation.  She would like referral.  Referral has been placed.    2. Adenomatous polyp of colon, unspecified part of colon  Repeat colonoscopy in August 2029.      New Medications:   No orders of the defined types were placed in this encounter.      Discontinued Medications:   There are no discontinued medications.     Visit Diagnoses:    ICD-10-CM ICD-9-CM   1. Esophageal dysphagia  R13.19 787.29   2. Adenomatous polyp of colon, unspecified part of colon  D12.6 211.3            Follow Up:   Return in  about 3 months (around 1/22/2025).    The patient was in agreement with the plan and all questions were answered to patient's satisfaction.        This document has been electronically signed by Maritza Lauren PA-C   October 22, 2024 10:43 EDT    Dictated Utilizing Dragon Dictation: Part of this note may be an electronic transcription/translation of spoken language to printed text using the Dragon Dictation System.    CC:  No ref. provider found  Anuel Matson APRN

## 2024-10-28 ENCOUNTER — OFFICE VISIT (OUTPATIENT)
Dept: PULMONOLOGY | Facility: CLINIC | Age: 44
End: 2024-10-28
Payer: COMMERCIAL

## 2024-10-28 VITALS
SYSTOLIC BLOOD PRESSURE: 102 MMHG | BODY MASS INDEX: 28.68 KG/M2 | WEIGHT: 168 LBS | HEART RATE: 78 BPM | DIASTOLIC BLOOD PRESSURE: 74 MMHG | TEMPERATURE: 97.1 F | HEIGHT: 64 IN | OXYGEN SATURATION: 97 %

## 2024-10-28 DIAGNOSIS — I27.20 PULMONARY HTN: ICD-10-CM

## 2024-10-28 DIAGNOSIS — J70.1 RADIATION-INDUCED PULMONARY FIBROSIS: Primary | ICD-10-CM

## 2024-10-28 PROCEDURE — 3074F SYST BP LT 130 MM HG: CPT | Performed by: NURSE PRACTITIONER

## 2024-10-28 PROCEDURE — 99214 OFFICE O/P EST MOD 30 MIN: CPT | Performed by: NURSE PRACTITIONER

## 2024-10-28 PROCEDURE — 1159F MED LIST DOCD IN RCRD: CPT | Performed by: NURSE PRACTITIONER

## 2024-10-28 PROCEDURE — 1160F RVW MEDS BY RX/DR IN RCRD: CPT | Performed by: NURSE PRACTITIONER

## 2024-10-28 PROCEDURE — 3078F DIAST BP <80 MM HG: CPT | Performed by: NURSE PRACTITIONER

## 2024-10-28 RX ORDER — ONDANSETRON 4 MG/1
TABLET, ORALLY DISINTEGRATING ORAL
COMMUNITY

## 2024-10-28 NOTE — PROGRESS NOTES
"Chief Complaint  Radiation-induced pulmonary fibrosis    Subjective          Renate Padmini Mitchell presents to [facility] for   History of Present Illness    Ms. Mitchell is a 44 year old female with a medical history significant for hyperlipidemia, arthritis, CAD, depression, diabetes, GERD, heart disease, Migraine, and pulmonary hypertension.    She presents today for follow up on radiation induced pulmonary fibrosis.  She reports that she is doing well today.  She states that her breathing is at baseline.  She has albuterol that she is using as needed.      Objective   Vital Signs:   /74   Pulse 78   Temp 97.1 °F (36.2 °C)   Ht 162.6 cm (64\")   Wt 76.2 kg (168 lb)   SpO2 97%   BMI 28.84 kg/m²         Physical Exam  GENERAL APPEARANCE: Well developed, well nourished, alert and cooperative, and appears to be in no acute distress.    HEAD: normocephalic. Atraumatic.    EYES: PERRL, EOMI. Vision is grossly intact.    THROAT: Oral cavity and pharynx normal. No inflammation, swelling, exudate, or lesions.     NECK: Neck supple.  No thyromegaly.    CARDIAC: Normal S1 and S2. No S3, S4 or murmurs. Rhythm is regular.     RESPIRATORY:Bilateral air entry positive. No wheezing, crackles or rhonchi noted.    GI: Positive bowel sounds. Soft, nondistended, nontender.     MUSCULOSKELETAL: No significant deformity or joint abnormality. No edema. Peripheral pulses intact. No varicosities.    NEUROLOGICAL: Strength and sensation symmetric and intact throughout.     PSYCHIATRIC: The mental examination revealed the patient was oriented to person, place, and time.       Estimated body mass index is 28.84 kg/m² as calculated from the following:    Height as of this encounter: 162.6 cm (64\").    Weight as of this encounter: 76.2 kg (168 lb).        Result Review :   The following data was reviewed by: KELLEY Pyle on 10/28/2024:  Common labs          " "9/9/2024    10:52   Common Labs   Creatinine 0.90           PFT:1/31/22    PULMONARY FUNCTION TEST  Order: [id number]  Narrative    This result has an attachment that is not available.  There is no airflow obstruction.    There is no significant improvement in FVC and FEV1 with one time inhaled  beta-agonist bronchodilator.    There is moderately-severe restriction.    The diffusion capacity for carbon monoxide, uncorrected for hemoglobin, is  mildly reduced; however may be underestimated due to patients effort.    The maximum inspiratory force is mild to moderately decreased, and the  maximal expiratory pressures is low normal.    Low dose lung cancer screening:NA    Previous chest imaging:  CT chest 6/23/23  CT ANGIOGRAM CHEST PULMONARY EMBOLISM  Order: [id number]  Impression    1. No pulmonary embolism. Residual left basilar pneumothorax with left chest tube    2. Findings concerning for splenic injury/laceration along the posterior aspect with small hyperenhancing foci concerning for small arterial bleeding sites with moderate sized perisplenic hematoma tracking to the left paracolic gutter and pelvis. Locules of free intraperitoneal air along the posterior aspect of the spleen and left kidney in close vicinity to the left hemidiaphragm and insertion of the chest tube could be postprocedural.    CRITICAL RESULT:    Yes    COMMUNICATION:  These findings were discussed with Dr. Beasley on 6/23/2023 5:43 PM by Martha Parmar MD via secure Epic chat.    Drafted by Martha Parmar MD on 6/23/2023 5:32 PM  Final report signed by Martha Parmar MD on 6/23/2023 5:45 PM      Alpha-1 antitrypsin screening:NA    STOP-Bang Score:   NA  Victoria Sleepiness Scale:   NA      ABG:    pH No results found for: \"PHART\"   pO2 No results found for: \"PO2ART\"   pCO2 No results found for: \"GHB2QRP\"   HCO3 No results found for: \"CAK0LSL\"                      Assessment and Plan    Problem List Items Addressed This Visit    None  Visit " Diagnoses       Radiation-induced pulmonary fibrosis    -  Primary    Pulmonary HTN                Renate Mitchell  reports that she has never smoked. She has never been exposed to tobacco smoke. She has never used smokeless tobacco.    She continues to use albuterol as needed.    She reports that her breathing is at baseline.    Will obtain CT Chest on an as needed basis.      Pulmonary hypertension is likely related to fibrosis.    Follow Up   Return in about 1 year (around 10/28/2025).  Patient was given instructions and counseling regarding her condition or for health maintenance advice. Please see specific information pulled into the AVS if appropriate.

## 2024-12-03 ENCOUNTER — OFFICE VISIT (OUTPATIENT)
Dept: CARDIOLOGY | Facility: CLINIC | Age: 44
End: 2024-12-03
Payer: COMMERCIAL

## 2024-12-03 VITALS
DIASTOLIC BLOOD PRESSURE: 80 MMHG | HEIGHT: 64 IN | SYSTOLIC BLOOD PRESSURE: 110 MMHG | RESPIRATION RATE: 18 BRPM | HEART RATE: 87 BPM | WEIGHT: 163 LBS | BODY MASS INDEX: 27.83 KG/M2

## 2024-12-03 DIAGNOSIS — I95.89 CHRONIC HYPOTENSION: ICD-10-CM

## 2024-12-03 DIAGNOSIS — R55 BLACKOUT SPELL: ICD-10-CM

## 2024-12-03 DIAGNOSIS — E78.2 MIXED HYPERLIPIDEMIA: ICD-10-CM

## 2024-12-03 DIAGNOSIS — R06.09 DYSPNEA ON EXERTION: ICD-10-CM

## 2024-12-03 DIAGNOSIS — R00.2 PALPITATIONS: ICD-10-CM

## 2024-12-03 DIAGNOSIS — R01.1 HEART MURMUR: ICD-10-CM

## 2024-12-03 DIAGNOSIS — I51.89 DIASTOLIC DYSFUNCTION: Primary | ICD-10-CM

## 2024-12-03 DIAGNOSIS — I10 ESSENTIAL HYPERTENSION: ICD-10-CM

## 2024-12-03 DIAGNOSIS — E66.3 OVERWEIGHT: ICD-10-CM

## 2024-12-03 DIAGNOSIS — E11.9 TYPE 2 DIABETES MELLITUS WITHOUT COMPLICATION, WITHOUT LONG-TERM CURRENT USE OF INSULIN: ICD-10-CM

## 2024-12-03 RX ORDER — POTASSIUM CHLORIDE 750 MG/1
10 TABLET, EXTENDED RELEASE ORAL DAILY
Qty: 30 TABLET | Refills: 8 | Status: SHIPPED | OUTPATIENT
Start: 2024-12-03

## 2024-12-03 RX ORDER — FUROSEMIDE 20 MG/1
20 TABLET ORAL DAILY
Qty: 30 TABLET | Refills: 11 | Status: SHIPPED | OUTPATIENT
Start: 2024-12-03

## 2024-12-03 NOTE — PROGRESS NOTES
Chief Complaint   Patient presents with    Follow-up     Pt is here for cardiac follow up.   Pt states her B/P has been running low 79/47. Had a syncopal episode last Wednesday. States she was a sleep on couch and when she got up she blacked out. Labs in chart 12/4/23, recent labs done in the last few month per pcp    Med Refill     90 day refills on cardiac meds [facility], med list not brought verbally went over.       Cardiac Complaints  Light headed, blacking out      Subjective   Renate Padmini Mitchell is a 44 y.o. female with palpitations, HTN,  diastolic dysfunction, hypothyroidism, and non-Hodgkin's lymphoma, who had chemo and radiation in the past. May 2015 she was seen for pericarditis versus worsening lymphoma.  Work-up showed essentially normal coronaries and mild LV dysfunction.  In 2017 repeat stress was negative for ischemia but showed increased heart rate and blood pressure response.  Echo showed EF had improved to 55%.  Cardiac monitor revealed baseline sinus rhythm with average heart rate of 96 bpm but no arrhythmia.  Beta-blocker dose was increased.  In September 2018 sleep study reported as normal.  Repeat echo and Holter monitor at that time showed similar findings.  Coreg dose increased for better heart rate control. 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 2022 more SOA noted and echo advised that showed EF of 56-60%, grade I diastolic dysfunction and RVSP of 33mmHg similar to prior, current regimen continued.      She comes today for follow up and admits to blacking out on Wednesday, patient reports BP has been running too low. She states she had blood work a few months ago with PCP, none available. She does report with weight loss on mounjaro, this has contributed to lower BP. Refills requested.            Cardiac History  Past Surgical History:   Procedure  Laterality Date    BREAST BIOPSY Right 2019    CARDIAC BIOPSY      CARDIAC CATHETERIZATION  05/07/2009    normal but small coronaries, EF 45-50%    CARDIAC CATHETERIZATION  08/04/2015    essentially normal coronaries. EF 50%    CARDIAC CATHETERIZATION  10/27/2021    [facility], Dr EDU Haas- normal PA pressure, mild increase PCWP, negative for pulmonary hypertension    CARDIOVASCULAR STRESS TEST  01/11/2008    A. Cardiolyte- (Dr. Jay). EF 45% at rest, 53% with stress    CARDIOVASCULAR STRESS TEST  02/10/2009    3 min, 20 sec. 95% THR. bp- 180/98, EF 49%. Negative    CARDIOVASCULAR STRESS TEST  07/2015    R. Stress- 4 min, 103% THR. 106/68, negative by EKG criteria    CARDIOVASCULAR STRESS TEST  05/18/2017    4 min 39 sec, 97% THR, 157/76, no obvious ischemia, increase Coreg    CHOLECYSTECTOMY N/A 03/08/2018    Procedure: CHOLECYSTECTOMY LAPAROSCOPIC;  Surgeon: Artie Oneill MD;  Location:  [facility];  Service:     COLONOSCOPY N/A 12/22/2017    Procedure: COLONOSCOPY;  Surgeon: Artie Oneill MD;  Location:  [facility];  Service:     COLONOSCOPY N/A 08/28/2024    Procedure: COLONOSCOPY FOR SCREENING;  Surgeon: Cherelle Rothman MD;  Location:  [facility];  Service: Gastroenterology;  Laterality: N/A;    CONVERTED (HISTORICAL) HOLTER  05/23/2017    sinus ave 96 bpm, 1 PVC, 4 PACs    CONVERTED (HISTORICAL) HOLTER  08/28/2018    Avg 96, one 6 beat SVT    DIAPHRAGM PLICATION Left 06/23/2023    ECHO - CONVERTED  02/10/2009    EF 50-55%    ECHO - CONVERTED  07/20/2015    EF 40-45%, RVSP 43 mmHg mild MR    ECHO - CONVERTED  05/18/2017    EF 50-55%, RVSP 37 mmHg, mild MR    ECHO - CONVERTED  09/05/2018    TLS. EF 55%. RVSP- 33 mmHg.    ECHO - CONVERTED  08/25/2020    TLS. EF 60%. Trace-Mild MR. RVSP- 40 mmHg.    ECHO - CONVERTED  03/29/2021    TLS. EF 55%. Trace-Mild MR & AI. RVSP- 35 mmHg    ECHO - CONVERTED  10/10/2022    TLS. EF 60%. LA- 3.8. Trace-Mild MR & AI. RVSP- 33 mmHg    ENDOSCOPY N/A 08/28/2024     Procedure: ESOPHAGOGASTRODUODENOSCOPY WITH BIOPSY;  Surgeon: Cherelle Rothman MD;  Location:  [facility];  Service: Gastroenterology;  Laterality: N/A;    ENDOSCOPY AND COLONOSCOPY  08/28/2024    HIP SURGERY Left 2020    lypoma excision    OTHER SURGICAL HISTORY  08/2015    CT abd/pelvis and chest- essentially unremarkable    OTHER SURGICAL HISTORY  08/2015    HIDA scan- WNL    PERICARDIAL WINDOW      TOTAL LAPAROSCOPIC HYSTERECTOMY N/A 03/08/2018    Procedure: TOTAL LAPAROSCOPIC HYSTERECTOMY BILATERAL SALPINGECTOMY AND LEFT OOPHORECTOMY WITH DAVINCI SI ROBOT;  Surgeon: Bill Greenwood DO;  Location: [facility] OR;  Service:        Current Outpatient Medications   Medication Sig Dispense Refill    carvedilol (COREG) 25 MG tablet Take 1 tablet by mouth 2 (Two) Times a Day. 180 tablet 3    FLUoxetine (PROzac) 40 MG capsule Take 1 capsule by mouth Daily.      HYDROcodone-acetaminophen (NORCO) 7.5-325 MG per tablet Take 1 tablet by mouth 3 (Three) Times a Day.      ibuprofen (ADVIL,MOTRIN) 800 MG tablet As Needed.      levothyroxine (SYNTHROID, LEVOTHROID) 75 MCG tablet Take 1 tablet by mouth Daily.      metOLazone (ZAROXOLYN) 2.5 MG tablet Take 30 minutes to one hour prior to lasix as needed for swelling 90 tablet 3    ondansetron ODT (ZOFRAN-ODT) 4 MG disintegrating tablet TAKE 1 TABLET BY MOUTH EVERY 4 TO 6 HOURS AS NEEDED FOR NAUSEA AND VOMITING      pantoprazole (PROTONIX) 40 MG EC tablet Take 1 tablet by mouth Daily. 90 tablet 3    potassium chloride (KLOR-CON M10) 10 MEQ CR tablet Take 1 tablet by mouth Daily. with food. 30 tablet 8    pregabalin (LYRICA) 150 MG capsule Take 1 capsule by mouth Every Night.      SUMAtriptan (IMITREX) 50 MG tablet 1 tablet 1 (One) Time As Needed for Migraine. Take as directed      Tirzepatide (MOUNJARO) 12.5 MG/0.5ML solution pen-injector pen Inject 0.5 mL under the skin into the appropriate area as directed 1 (One) Time Per Week.      tiZANidine (ZANAFLEX) 4 MG tablet  Take 2 tablets by mouth Every Night.      VITAMIN D, CHOLECALCIFEROL, PO Take 5,000 Units by mouth Daily.      furosemide (LASIX) 20 MG tablet Take 1 tablet by mouth Daily. 30 tablet 11     No current facility-administered medications for this visit.       Codeine, Cymbalta [duloxetine hcl], Neurontin [gabapentin], Prednisolone, Sulfa antibiotics, Tramadol hcl, and Tyloxapol    Past Medical History:   Diagnosis Date    Abnormal uterine bleeding (AUB)     Arthritis     Arthritis, lumbar spine     Chest biopsy     Chronic idiopathic fibrosing alveolitis 2021    Coronary artery disease 2021    Depression     Diabetes mellitus     Diabetic peripheral neuropathy associated with type 2 diabetes mellitus 09/22/2022    Difficulty walking 2008    Due to neuropathy, sometimes I can’t put no weight on my fee    Disease of thyroid gland     GERD (gastroesophageal reflux disease)     H/O exploratory laparotomy     H/O tubal ligation     Heart BX , benign     Heart disease     History of bone marrow biopsy     History of chemotherapy     History of radiation therapy     HTN (hypertension)     ((Pt Qnr Sub: error))     Migraine     Mixed hyperlipidemia 08/30/2021    Neuropathy     CAUSED BY RADIATIONA ND CHEMO PER PATIENT    Non Hodgkin's lymphoma     very close to heart, in remission, last radiation 2/08, last chemo 12/07    Pain     LOWER LEFT QUAD    Pericardial effusion     s/p natty window- removed 398 cc     Peripheral neuropathy 2008    Pulmonary HTN     Tachycardia        Social History     Socioeconomic History    Marital status: Single   Tobacco Use    Smoking status: Never     Passive exposure: Never    Smokeless tobacco: Never   Vaping Use    Vaping status: Never Used   Substance and Sexual Activity    Alcohol use: No    Drug use: No    Sexual activity: Yes     Partners: Male     Birth control/protection: Tubal ligation, Hysterectomy       Family History   Problem Relation Age of Onset    Heart disease Mother      "Hypertension Mother     Heart failure Mother     Hypertension Father     Hyperlipidemia Father     Diabetes Father     Heart disease Child     Breast cancer Maternal Aunt     Diabetes Maternal Aunt     Colon cancer Maternal Uncle     Leukemia Paternal Uncle     Hodgkin's lymphoma Paternal Uncle     Lung cancer Paternal Grandfather     Cancer Paternal Grandfather         Lung Cancer    Leukemia Maternal Aunt        Review of Systems   Constitutional: Negative for malaise/fatigue and night sweats.   Cardiovascular:  Positive for near-syncope. Negative for chest pain, claudication, dyspnea on exertion, irregular heartbeat, leg swelling, orthopnea and syncope.   Respiratory:  Negative for cough, shortness of breath and wheezing.    Musculoskeletal:  Positive for stiffness. Negative for back pain and joint pain.   Gastrointestinal:  Negative for anorexia, heartburn, nausea and vomiting.   Genitourinary:  Negative for dysuria, hematuria, hesitancy and nocturia.   Neurological:  Positive for dizziness and light-headedness.   Psychiatric/Behavioral:  Negative for depression and memory loss. The patient is not nervous/anxious.            Objective     /80 (BP Location: Left arm, Patient Position: Sitting, Cuff Size: Adult)   Pulse 87   Resp 18   Ht 162.6 cm (64\")   Wt 73.9 kg (163 lb)   LMP 03/05/2018   BMI 27.98 kg/m²     Constitutional:       Appearance: Not in distress.   Eyes:      Pupils: Pupils are equal, round, and reactive to light.   HENT:      Nose: Nose normal.   Pulmonary:      Effort: Pulmonary effort is normal.      Breath sounds: Normal breath sounds.   Cardiovascular:      PMI at left midclavicular line. Normal rate. Regular rhythm.      Murmurs: There is a systolic murmur.   Abdominal:      Palpations: Abdomen is soft.   Musculoskeletal: Normal range of motion.      Cervical back: Normal range of motion and neck supple. Skin:     General: Skin is warm and dry.   Neurological:      Mental Status: " Alert.           ECG 12 Lead    Date/Time: 12/3/2024 9:07 AM  Performed by: Alba Rahman APRN    Authorized by: Alba Rahman APRN  Comparison: compared with previous ECG from 8/17/2020  Similar to previous ECG  Rhythm: sinus rhythm  BPM: 87    Clinical impression: non-specific ECG  Comments: Normal QT               Diagnoses and all orders for this visit:    1. Diastolic dysfunction (Primary)  -     Adult Transthoracic Echo Complete W/ Cont if Necessary Per Protocol; Future    2. Dyspnea on exertion  -     Adult Transthoracic Echo Complete W/ Cont if Necessary Per Protocol; Future    3. Heart murmur  -     Adult Transthoracic Echo Complete W/ Cont if Necessary Per Protocol; Future    4. Palpitations  -     ECG 12 Lead    5. Blackout spell    6. Chronic hypotension    7. Mixed hyperlipidemia    8. Essential hypertension    9. Type 2 diabetes mellitus without complication, without long-term current use of insulin    10. Overweight    Other orders  -     furosemide (LASIX) 20 MG tablet; Take 1 tablet by mouth Daily.  Dispense: 30 tablet; Refill: 11  -     potassium chloride (KLOR-CON M10) 10 MEQ CR tablet; Take 1 tablet by mouth Daily. with food.  Dispense: 30 tablet; Refill: 8             SOA/Diastolic dysfunction: lasix will be continued, but will lower to 20mg for hypotension, adequate fluids advised. Will continue to use zaroxolyn only as needed. Will repeat echo to assess LV function.     Palpitations/HTN: Well managed on current therapy, palpitations denied. Continue same coreg. EKG done today showed NSR with normal QT. If light headedness persists, will encourage holter to assess rhythm, if med adjustment does not help with symptoms.     Edema: Well managed on current, using lasix, but with weight loss and lower BP, will lower to 20mg daily. Continue K replacement.      Labs with your office, can we have for review?      Refills per request.     BMI noted at 27.98, good cardiac ADA diet with limited  carbs, calories, and walking regimen advised. Patient praised for weight loss efforts. Continues mounjaro.     6 month follow up advised or sooner if needed.       Problems Addressed this Visit          Cardiac and Vasculature    Essential hypertension    Relevant Medications    furosemide (LASIX) 20 MG tablet    Mixed hyperlipidemia       Endocrine and Metabolic    Type 2 diabetes mellitus without complication, without long-term current use of insulin     Other Visit Diagnoses       Diastolic dysfunction    -  Primary    Relevant Orders    Adult Transthoracic Echo Complete W/ Cont if Necessary Per Protocol    Dyspnea on exertion        Relevant Orders    Adult Transthoracic Echo Complete W/ Cont if Necessary Per Protocol    Heart murmur        Relevant Orders    Adult Transthoracic Echo Complete W/ Cont if Necessary Per Protocol    Palpitations        Relevant Orders    ECG 12 Lead    Blackout spell        Chronic hypotension        Overweight              Diagnoses         Codes Comments    Diastolic dysfunction    -  Primary ICD-10-CM: I51.89  ICD-9-CM: 429.9     Dyspnea on exertion     ICD-10-CM: R06.09  ICD-9-CM: 786.09     Heart murmur     ICD-10-CM: R01.1  ICD-9-CM: 785.2     Palpitations     ICD-10-CM: R00.2  ICD-9-CM: 785.1     Blackout spell     ICD-10-CM: R55  ICD-9-CM: 780.2     Chronic hypotension     ICD-10-CM: I95.89  ICD-9-CM: 458.1     Mixed hyperlipidemia     ICD-10-CM: E78.2  ICD-9-CM: 272.2     Essential hypertension     ICD-10-CM: I10  ICD-9-CM: 401.9     Type 2 diabetes mellitus without complication, without long-term current use of insulin     ICD-10-CM: E11.9  ICD-9-CM: 250.00     Overweight     ICD-10-CM: E66.3  ICD-9-CM: 278.02                          Electronically signed by Alba Rahman, APRN December 3, 2024 12:31 EST

## 2024-12-09 ENCOUNTER — HOSPITAL ENCOUNTER (OUTPATIENT)
Dept: CARDIOLOGY | Facility: HOSPITAL | Age: 44
Discharge: HOME | End: 2024-12-09
Admitting: NURSE PRACTITIONER
Payer: COMMERCIAL

## 2024-12-09 DIAGNOSIS — R06.09 DYSPNEA ON EXERTION: ICD-10-CM

## 2024-12-09 DIAGNOSIS — R01.1 HEART MURMUR: ICD-10-CM

## 2024-12-09 DIAGNOSIS — I51.89 DIASTOLIC DYSFUNCTION: ICD-10-CM

## 2024-12-09 LAB
AORTIC DIMENSIONLESS INDEX: 0.9 (DI)
BH CV ECHO MEAS - ACS: 1.98 CM
BH CV ECHO MEAS - AI P1/2T: 792.9 MSEC
BH CV ECHO MEAS - AO MAX PG: 5.4 MMHG
BH CV ECHO MEAS - AO MEAN PG: 2.8 MMHG
BH CV ECHO MEAS - AO ROOT DIAM: 3.1 CM
BH CV ECHO MEAS - AO V2 MAX: 115.9 CM/SEC
BH CV ECHO MEAS - AO V2 VTI: 22.2 CM
BH CV ECHO MEAS - AVA(I,D): 2.8 CM2
BH CV ECHO MEAS - EDV(CUBED): 79.7 ML
BH CV ECHO MEAS - EDV(MOD-SP4): 78.9 ML
BH CV ECHO MEAS - EF(MOD-SP4): 42.8 %
BH CV ECHO MEAS - ESV(CUBED): 45.7 ML
BH CV ECHO MEAS - ESV(MOD-SP4): 45.1 ML
BH CV ECHO MEAS - FS: 16.9 %
BH CV ECHO MEAS - IVS/LVPW: 0.56 CM
BH CV ECHO MEAS - IVSD: 0.71 CM
BH CV ECHO MEAS - LA A2CS (ATRIAL LENGTH): 4.5 CM
BH CV ECHO MEAS - LA A4C LENGTH: 4.9 CM
BH CV ECHO MEAS - LA DIMENSION: 3.7 CM
BH CV ECHO MEAS - LAT PEAK E' VEL: 10 CM/SEC
BH CV ECHO MEAS - LV DIASTOLIC VOL/BSA (35-75): 44 CM2
BH CV ECHO MEAS - LV MASS(C)D: 140.9 GRAMS
BH CV ECHO MEAS - LV MAX PG: 3.6 MMHG
BH CV ECHO MEAS - LV MEAN PG: 2.02 MMHG
BH CV ECHO MEAS - LV SYSTOLIC VOL/BSA (12-30): 25.2 CM2
BH CV ECHO MEAS - LV V1 MAX: 95 CM/SEC
BH CV ECHO MEAS - LV V1 VTI: 20.4 CM
BH CV ECHO MEAS - LVIDD: 4.3 CM
BH CV ECHO MEAS - LVIDS: 3.6 CM
BH CV ECHO MEAS - LVOT AREA: 3 CM2
BH CV ECHO MEAS - LVOT DIAM: 1.97 CM
BH CV ECHO MEAS - LVPWD: 1.27 CM
BH CV ECHO MEAS - MED PEAK E' VEL: 9 CM/SEC
BH CV ECHO MEAS - MV A MAX VEL: 54.5 CM/SEC
BH CV ECHO MEAS - MV DEC SLOPE: 284.6 CM/SEC2
BH CV ECHO MEAS - MV DEC TIME: 0.2 SEC
BH CV ECHO MEAS - MV E MAX VEL: 69.3 CM/SEC
BH CV ECHO MEAS - MV E/A: 1.27
BH CV ECHO MEAS - MV MAX PG: 2.8 MMHG
BH CV ECHO MEAS - MV MEAN PG: 1.19 MMHG
BH CV ECHO MEAS - MV P1/2T: 83.5 MSEC
BH CV ECHO MEAS - MV V2 VTI: 22.9 CM
BH CV ECHO MEAS - MVA(P1/2T): 2.6 CM2
BH CV ECHO MEAS - MVA(VTI): 2.7 CM2
BH CV ECHO MEAS - PA V2 MAX: 109.2 CM/SEC
BH CV ECHO MEAS - RAP SYSTOLE: 10 MMHG
BH CV ECHO MEAS - RV MAX PG: 1.72 MMHG
BH CV ECHO MEAS - RV V1 MAX: 65.6 CM/SEC
BH CV ECHO MEAS - RV V1 VTI: 16.1 CM
BH CV ECHO MEAS - RVDD: 2.22 CM
BH CV ECHO MEAS - RVSP: 37.4 MMHG
BH CV ECHO MEAS - SV(LVOT): 62.1 ML
BH CV ECHO MEAS - SV(MOD-SP4): 33.8 ML
BH CV ECHO MEAS - SVI(LVOT): 34.6 ML/M2
BH CV ECHO MEAS - SVI(MOD-SP4): 18.8 ML/M2
BH CV ECHO MEAS - TR MAX PG: 27.4 MMHG
BH CV ECHO MEAS - TR MAX VEL: 261.8 CM/SEC
BH CV ECHO MEASUREMENTS AVERAGE E/E' RATIO: 7.29
LEFT ATRIUM VOLUME INDEX: 17.7 ML/M2
LEFT ATRIUM VOLUME: 32 ML

## 2024-12-09 PROCEDURE — 93306 TTE W/DOPPLER COMPLETE: CPT

## 2024-12-09 PROCEDURE — 93306 TTE W/DOPPLER COMPLETE: CPT | Performed by: INTERNAL MEDICINE

## 2025-02-03 ENCOUNTER — OFFICE VISIT (OUTPATIENT)
Dept: GASTROENTEROLOGY | Facility: CLINIC | Age: 45
End: 2025-02-03
Payer: COMMERCIAL

## 2025-02-03 VITALS
WEIGHT: 156 LBS | HEIGHT: 64 IN | BODY MASS INDEX: 26.63 KG/M2 | DIASTOLIC BLOOD PRESSURE: 72 MMHG | HEART RATE: 83 BPM | SYSTOLIC BLOOD PRESSURE: 112 MMHG

## 2025-02-03 DIAGNOSIS — K21.9 GASTROESOPHAGEAL REFLUX DISEASE, UNSPECIFIED WHETHER ESOPHAGITIS PRESENT: ICD-10-CM

## 2025-02-03 DIAGNOSIS — R13.19 ESOPHAGEAL DYSPHAGIA: Primary | ICD-10-CM

## 2025-02-03 PROCEDURE — 99213 OFFICE O/P EST LOW 20 MIN: CPT

## 2025-02-03 PROCEDURE — 1159F MED LIST DOCD IN RCRD: CPT

## 2025-02-03 PROCEDURE — 3078F DIAST BP <80 MM HG: CPT

## 2025-02-03 PROCEDURE — 1160F RVW MEDS BY RX/DR IN RCRD: CPT

## 2025-02-03 PROCEDURE — 3074F SYST BP LT 130 MM HG: CPT

## 2025-02-03 NOTE — PROGRESS NOTES
"Chief Complaint  Difficulty Swallowing    Renate Mitchell is a 44 y.o. female who presents today to [facility] for Difficulty Swallowing.    HPI:   44-year-old female with PMH of non-Hodgkin lymphoma, pulmonary fibrosis secondary to radiation treatment, chronic back pain on chronic opioid therapy, hypertension, T2DM who presents today for difficulty swallowing.  Patient states that she gets a \"stuck feeling\" when swallowing pills.  Reports that this sensation can be alleviated with swallowing liquids.  However, she states that it is an uncomfortable feeling as it feels like the liquid is \"pushing down on the pill.\"  States that she also will get an uncomfortable sensation randomly when she swallows drinks as well.  She reports that this occurs approximately 10-15 times per week.  She does have a history of GERD but states this is well-controlled with Prilosec 20 mg once.  She consumes a lot of fatty, fried, greasy foods.  She generally drinks coffee in the morning.  States that she will \"sip on a Coke.\"  She reports her bowels move every day.  She feels that she evacuates well and does not have to strain.  She denies unintentional weight loss, fever, chills, nausea, vomiting, hematemesis, abdominal pain, changes in bowel habit, constipation, diarrhea, melena, and hematochezia.  She is s/p cholecystectomy.     Of note, patient had a colonoscopy performed by Dr. Oneill in 2018.  This was notable for small polyp and pathology was notable for tubular adenoma.     EGD/colonoscopy performed on 8/28/2024 by Dr. Chen is notable for normal esophagus, small hiatal hernia, normal stomach, normal duodenum, multiple sessile polyps in the gastric fundus and gastric body.  Colonoscopy was notable for 7 mm polyp in the ascending colon, internal hemorrhoids, terminal ileum normal.  Repeat colonoscopy in 5 years for surveillance.  Stomach polyp biopsies notable for 9 fundic gland " polyps with mild chronic inflammation, no intestinal metaplasia or dysplasia, no H. pylori.  Esophageal biopsy notable for mild chronic esophagitis.  Colon polyp was notable for fragments of tubular adenoma.       10/22/2024: Today, patient reports that she is having ongoing dysphagia.  States that she will have that stuck sensation after eating solid foods or taking her medications.  This happens approximately 10-15 times per week.  Dr. Chen did note an EGD report that this is likely secondary to dysmotility.  She reports that her acid reflux is well-controlled on Protonix 40 mg once daily.  She reports having a bowel movement regularly without excessive straining.  She denies abdominal pain, nausea, vomiting, unintentional weight loss, changes in bowel habits, melena, and hematochezia.  On last visit, patient was referred to Dr. Bishop             Interval History:    Today, patient presents for follow-up. She reports having ongoing dysphagia.  Reports having dysphagia 10-15 times per week.  States that this mostly occurs with medication.  She will get a stuck sensation in her esophagus and have to drink fluids to flush this down.  States that this sometimes occurs with food as well.  She reports her acid reflux is well-controlled on Protonix 40 mg once daily.  She has chronic nausea but has been on Mounjaro since August 2024.  She denies vomiting.  She has issues with constipation.  Reports having 2 bowel movements per week that she rates as BSS 4.  She has occasional poor evacuation of her colon and excessive straining.  She is not currently using stool softeners.  She denies unintentional weight loss, vomiting, abdominal pain, melena, and hematochezia.    Previous History:   Past Medical History:   Diagnosis Date    Abnormal uterine bleeding (AUB)     Arthritis     Arthritis, lumbar spine     Chest biopsy     Chronic idiopathic fibrosing alveolitis 2021    Coronary artery disease 2021    Depression      Diabetes mellitus     Diabetic peripheral neuropathy associated with type 2 diabetes mellitus 09/22/2022    Difficulty walking 2008    Due to neuropathy, sometimes I can’t put no weight on my fee    Disease of thyroid gland     GERD (gastroesophageal reflux disease)     H/O exploratory laparotomy     H/O tubal ligation     Heart BX , benign     Heart disease     History of bone marrow biopsy     History of chemotherapy     History of radiation therapy     HTN (hypertension)     ((Pt Qnr Sub: error))     Migraine     Mixed hyperlipidemia 08/30/2021    Neuropathy     CAUSED BY RADIATIONA ND CHEMO PER PATIENT    Non Hodgkin's lymphoma     very close to heart, in remission, last radiation 2/08, last chemo 12/07    Pain     LOWER LEFT QUAD    Pericardial effusion     s/p natty window- removed 398 cc     Peripheral neuropathy 2008    Pulmonary HTN     Tachycardia       Past Surgical History:   Procedure Laterality Date    BREAST BIOPSY Right 2019    CARDIAC BIOPSY      CARDIAC CATHETERIZATION  05/07/2009    normal but small coronaries, EF 45-50%    CARDIAC CATHETERIZATION  08/04/2015    essentially normal coronaries. EF 50%    CARDIAC CATHETERIZATION  10/27/2021    [facility]- [facility], Dr EDU Haas- normal PA pressure, mild increase PCWP, negative for pulmonary hypertension    CARDIOVASCULAR STRESS TEST  01/11/2008    A. Cardiolyte- (Dr. Jay). EF 45% at rest, 53% with stress    CARDIOVASCULAR STRESS TEST  02/10/2009    3 min, 20 sec. 95% THR. bp- 180/98, EF 49%. Negative    CARDIOVASCULAR STRESS TEST  07/2015    R. Stress- 4 min, 103% THR. 106/68, negative by EKG criteria    CARDIOVASCULAR STRESS TEST  05/18/2017    4 min 39 sec, 97% THR, 157/76, no obvious ischemia, increase Coreg    CHOLECYSTECTOMY N/A 03/08/2018    Procedure: CHOLECYSTECTOMY LAPAROSCOPIC;  Surgeon: Artie Oneill MD;  Location: [facility] OR;  Service:     COLONOSCOPY N/A 12/22/2017    Procedure: COLONOSCOPY;  Surgeon: Artie Oneill MD;  Location: [facility] OR;   Service:     COLONOSCOPY N/A 08/28/2024    Procedure: COLONOSCOPY FOR SCREENING;  Surgeon: Cherelle Rothman MD;  Location: [facility] OR;  Service: Gastroenterology;  Laterality: N/A;    CONVERTED (HISTORICAL) HOLTER  05/23/2017    sinus ave 96 bpm, 1 PVC, 4 PACs    CONVERTED (HISTORICAL) HOLTER  08/28/2018    Avg 96, one 6 beat SVT    DIAPHRAGM PLICATION Left 06/23/2023    ECHO - CONVERTED  02/10/2009    EF 50-55%    ECHO - CONVERTED  07/20/2015    EF 40-45%, RVSP 43 mmHg mild MR    ECHO - CONVERTED  05/18/2017    EF 50-55%, RVSP 37 mmHg, mild MR    ECHO - CONVERTED  09/05/2018    TLS. EF 55%. RVSP- 33 mmHg.    ECHO - CONVERTED  08/25/2020    TLS. EF 60%. Trace-Mild MR. RVSP- 40 mmHg.    ECHO - CONVERTED  03/29/2021    TLS. EF 55%. Trace-Mild MR & AI. RVSP- 35 mmHg    ECHO - CONVERTED  10/10/2022    TLS. EF 60%. LA- 3.8. Trace-Mild MR & AI. RVSP- 33 mmHg    ECHO - CONVERTED  12/09/2024    TLS. EF 55%, LA-3.7. THE. Trace-Nild MR & AI. RVSP-  37 mmHg    ENDOSCOPY N/A 08/28/2024    Procedure: ESOPHAGOGASTRODUODENOSCOPY WITH BIOPSY;  Surgeon: Cherelle Rothman MD;  Location: [facility] OR;  Service: Gastroenterology;  Laterality: N/A;    ENDOSCOPY AND COLONOSCOPY  08/28/2024    HIP SURGERY Left 2020    lypoma excision    OTHER SURGICAL HISTORY  08/2015    CT abd/pelvis and chest- essentially unremarkable    OTHER SURGICAL HISTORY  08/2015    HIDA scan- WNL    PERICARDIAL WINDOW      TOTAL LAPAROSCOPIC HYSTERECTOMY N/A 03/08/2018    Procedure: TOTAL LAPAROSCOPIC HYSTERECTOMY BILATERAL SALPINGECTOMY AND LEFT OOPHORECTOMY WITH DAVINCI SI ROBOT;  Surgeon: Bill Greenwood DO;  Location: [facility];  Service:       Social History     Socioeconomic History    Marital status: Single   Tobacco Use    Smoking status: Never     Passive exposure: Never    Smokeless tobacco: Never   Vaping Use    Vaping status: Never Used   Substance and Sexual Activity    Alcohol use: No    Drug use: No    Sexual activity: Yes      Partners: Male     Birth control/protection: Tubal ligation, Hysterectomy        Current Medications:  Current Outpatient Medications   Medication Sig Dispense Refill    carvedilol (COREG) 25 MG tablet Take 1 tablet by mouth 2 (Two) Times a Day. 180 tablet 3    FLUoxetine (PROzac) 40 MG capsule Take 1 capsule by mouth Daily.      furosemide (LASIX) 20 MG tablet Take 1 tablet by mouth Daily. 30 tablet 11    HYDROcodone-acetaminophen (NORCO) 7.5-325 MG per tablet Take 1 tablet by mouth 3 (Three) Times a Day.      ibuprofen (ADVIL,MOTRIN) 800 MG tablet As Needed.      levothyroxine (SYNTHROID, LEVOTHROID) 75 MCG tablet Take 1 tablet by mouth Daily.      metOLazone (ZAROXOLYN) 2.5 MG tablet Take 30 minutes to one hour prior to lasix as needed for swelling 90 tablet 3    ondansetron ODT (ZOFRAN-ODT) 4 MG disintegrating tablet TAKE 1 TABLET BY MOUTH EVERY 4 TO 6 HOURS AS NEEDED FOR NAUSEA AND VOMITING      pantoprazole (PROTONIX) 40 MG EC tablet Take 1 tablet by mouth Daily. 90 tablet 3    potassium chloride (KLOR-CON M10) 10 MEQ CR tablet Take 1 tablet by mouth Daily. with food. 30 tablet 8    pregabalin (LYRICA) 150 MG capsule Take 1 capsule by mouth Every Night.      SUMAtriptan (IMITREX) 50 MG tablet 1 tablet 1 (One) Time As Needed for Migraine. Take as directed      Tirzepatide (MOUNJARO) 12.5 MG/0.5ML solution pen-injector pen Inject 0.5 mL under the skin into the appropriate area as directed 1 (One) Time Per Week.      tiZANidine (ZANAFLEX) 4 MG tablet Take 2 tablets by mouth Every Night.      VITAMIN D, CHOLECALCIFEROL, PO Take 5,000 Units by mouth Daily.       No current facility-administered medications for this visit.       Allergies:   Allergies   Allergen Reactions    Codeine Hives     ITCHES    Cymbalta [Duloxetine Hcl] Other (See Comments)     UNABLE TO MOVE MUSCLES    Neurontin [Gabapentin] Other (See Comments)     BLADDER DOESN'T WORK     Prednisolone Nausea Only    Sulfa Antibiotics Other (See Comments)  "    Causes chills, watery eyes    Tramadol Hcl Rash    Tyloxapol Rash       Vitals:   /72   Pulse 83   Ht 162.6 cm (64\")   Wt 70.8 kg (156 lb)   LMP 03/05/2018   BMI 26.78 kg/m²   Estimated body mass index is 26.78 kg/m² as calculated from the following:    Height as of this encounter: 162.6 cm (64\").    Weight as of this encounter: 70.8 kg (156 lb).    ROS:   Review of Systems   Constitutional: Negative.    HENT:  Positive for trouble swallowing.    Respiratory: Negative.     Cardiovascular: Negative.    Gastrointestinal:  Positive for constipation and nausea. Negative for abdominal distention, abdominal pain, anal bleeding, blood in stool, diarrhea, rectal pain and vomiting.   All other systems reviewed and are negative.       Physical Exam:   Physical Exam  Vitals reviewed.   Constitutional:       General: She is not in acute distress.     Appearance: Normal appearance. She is well-groomed. She is not toxic-appearing.   HENT:      Head: Normocephalic and atraumatic.      Nose: Nose normal.      Mouth/Throat:      Mouth: Mucous membranes are moist.   Eyes:      Extraocular Movements: Extraocular movements intact.   Cardiovascular:      Rate and Rhythm: Normal rate and regular rhythm.      Heart sounds: Normal heart sounds. No murmur heard.  Pulmonary:      Effort: Pulmonary effort is normal. No respiratory distress.      Breath sounds: Normal breath sounds. No stridor. No wheezing or rales.   Abdominal:      General: Bowel sounds are normal. There is no distension.      Palpations: Abdomen is soft. There is no mass.      Tenderness: There is no abdominal tenderness. There is no guarding or rebound.      Hernia: No hernia is present.   Skin:     General: Skin is warm.      Coloration: Skin is not jaundiced.      Findings: No rash.   Neurological:      Mental Status: She is alert and oriented to person, place, and time.   Psychiatric:         Mood and Affect: Mood and affect normal.         Speech: Speech " normal.         Behavior: Behavior normal. Behavior is cooperative.         Thought Content: Thought content normal.          Lab Results:   Lab Results   Component Value Date    WBC 9.53 07/12/2023    HGB 12.5 07/12/2023    HCT 38.1 07/12/2023    MCV 94 07/12/2023    RDW 13.2 07/12/2023     (H) 07/12/2023    NEUTRORELPCT 87.0 07/12/2023    LYMPHORELPCT 10.0 07/12/2023    MONORELPCT 2.0 07/12/2023    EOSRELPCT 0.0 07/12/2023    BASORELPCT 0.0 07/12/2023    NEUTROABS 8.28 (H) 07/12/2023    LYMPHSABS 0.95 (L) 07/12/2023       Lab Results   Component Value Date     06/23/2023    K 3.7 06/24/2023    CO2 31.3 (H) 10/10/2022    CL 95 (L) 06/24/2023    BUN 15 10/10/2022    CREATININE 0.90 09/09/2024    EGFRIFNONA >60 09/07/2021    EGFRIFAFRI >60 09/07/2021    GLUCOSE 145 (H) 06/24/2023    CALCIUM 9.6 10/10/2022    ALKPHOS 124 (H) 10/10/2022    AST 30 10/10/2022    ALT 34 (H) 10/10/2022    BILITOT 0.5 10/10/2022    ALBUMIN 4.63 10/10/2022    PROTEINTOT 7.5 10/10/2022    MG 2.0 06/29/2023       Pathology:        Endoscopy:        Imaging:   No Images in the past 120 days found..      Results review: During today's encounter, all relevant clinical data has been reviewed.      Assessment and Plan  1. Esophageal dysphagia (Primary)  We will refer to Dr. Bishop for esophageal manometry.  -     Ambulatory Referral to Gastroenterology    2. Gastroesophageal reflux disease, unspecified whether esophagitis present  Discussed management for GERD: encouraged weight loss, HOB elevation at night, avoidance of meals 2-3 hours before bedtime, avoid trigger foods (caffeine, alcohol, chocolate, acidic/spicy foods e.g oranges/tomatoes), and encouraged smoking cessation  Well-controlled on Protonix, please continue.      New Medications:   No orders of the defined types were placed in this encounter.      Discontinued Medications:   There are no discontinued medications.     Visit Diagnoses:    ICD-10-CM ICD-9-CM   1. Esophageal  dysphagia  R13.19 787.29   2. Gastroesophageal reflux disease, unspecified whether esophagitis present  K21.9 530.81            Follow Up:   Return in about 2 months (around 4/3/2025).    The patient was in agreement with the plan and all questions were answered to patient's satisfaction.        This document has been electronically signed by Maritza Lauren PA-C   February 3, 2025 09:43 EST    Dictated Utilizing Dragon Dictation: Part of this note may be an electronic transcription/translation of spoken language to printed text using the Dragon Dictation System.    CC:  No ref. provider found  Anuel Matson APRN

## 2025-04-22 ENCOUNTER — HOSPITAL ENCOUNTER (OUTPATIENT)
Dept: GENERAL RADIOLOGY | Facility: HOSPITAL | Age: 45
Discharge: HOME | End: 2025-04-22
Admitting: NURSE PRACTITIONER
Payer: COMMERCIAL

## 2025-04-22 ENCOUNTER — OFFICE VISIT (OUTPATIENT)
Dept: PULMONOLOGY | Facility: CLINIC | Age: 45
End: 2025-04-22
Payer: COMMERCIAL

## 2025-04-22 ENCOUNTER — TELEPHONE (OUTPATIENT)
Dept: PULMONOLOGY | Facility: CLINIC | Age: 45
End: 2025-04-22
Payer: COMMERCIAL

## 2025-04-22 VITALS
OXYGEN SATURATION: 98 % | WEIGHT: 158 LBS | TEMPERATURE: 96.9 F | HEART RATE: 97 BPM | HEIGHT: 64 IN | DIASTOLIC BLOOD PRESSURE: 76 MMHG | SYSTOLIC BLOOD PRESSURE: 124 MMHG | BODY MASS INDEX: 26.98 KG/M2

## 2025-04-22 DIAGNOSIS — R06.02 SHORTNESS OF BREATH: ICD-10-CM

## 2025-04-22 DIAGNOSIS — I27.20 PULMONARY HTN: ICD-10-CM

## 2025-04-22 DIAGNOSIS — J70.1 RADIATION-INDUCED PULMONARY FIBROSIS: Primary | ICD-10-CM

## 2025-04-22 DIAGNOSIS — J70.1 RADIATION-INDUCED PULMONARY FIBROSIS: ICD-10-CM

## 2025-04-22 PROCEDURE — 71046 X-RAY EXAM CHEST 2 VIEWS: CPT | Performed by: RADIOLOGY

## 2025-04-22 PROCEDURE — 3078F DIAST BP <80 MM HG: CPT | Performed by: NURSE PRACTITIONER

## 2025-04-22 PROCEDURE — 1160F RVW MEDS BY RX/DR IN RCRD: CPT | Performed by: NURSE PRACTITIONER

## 2025-04-22 PROCEDURE — 71046 X-RAY EXAM CHEST 2 VIEWS: CPT

## 2025-04-22 PROCEDURE — 1159F MED LIST DOCD IN RCRD: CPT | Performed by: NURSE PRACTITIONER

## 2025-04-22 PROCEDURE — 3074F SYST BP LT 130 MM HG: CPT | Performed by: NURSE PRACTITIONER

## 2025-04-22 PROCEDURE — 99214 OFFICE O/P EST MOD 30 MIN: CPT | Performed by: NURSE PRACTITIONER

## 2025-04-22 RX ORDER — ALBUTEROL SULFATE AND BUDESONIDE 90; 80 UG/1; UG/1
2 AEROSOL, METERED RESPIRATORY (INHALATION) EVERY 4 HOURS PRN
Qty: 10.7 G | Refills: 5 | Status: SHIPPED | OUTPATIENT
Start: 2025-04-22

## 2025-04-22 RX ORDER — ROSUVASTATIN CALCIUM 20 MG/1
40 TABLET, COATED ORAL DAILY
COMMUNITY

## 2025-04-22 NOTE — PROGRESS NOTES
"Chief Complaint  Radiation-induced pulmonary fibrosis and smothering    Subjective          Renate Padmini Mitchell presents to [facility] for   History of Present Illness    Ms. Mitchell is a 44 year old female with a medical history significant for diabetes, hyperlipidemia, arthritis, CAD, depression, GERD, Migraine, post radiation fibrosis and pulmonary hypertension.    She presents today for follow up  on shortness of breath.  She reports that she has had more shortness of breath for the last few months.  She states that she is out of refills on her albuterol inhaler so she has not been taking this.        Objective   Vital Signs:   /76   Pulse 97   Temp 96.9 °F (36.1 °C)   Ht 162.6 cm (64.02\")   Wt 71.7 kg (158 lb)   SpO2 98%   BMI 27.11 kg/m²         Physical Exam    GENERAL APPEARANCE: Well developed, well nourished, alert and cooperative, and appears to be in no acute distress.    HEAD: normocephalic. Atraumatic.    EYES: PERRL, EOMI. Vision is grossly intact.    THROAT: Oral cavity and pharynx normal. No inflammation, swelling, exudate, or lesions.     NECK: Neck supple.  No thyromegaly.    CARDIAC: Normal S1 and S2. No S3, S4 or murmurs. Rhythm is regular.     RESPIRATORY:Bilateral air entry positive. No wheezing, crackles or rhonchi noted.    GI: Positive bowel sounds. Soft, nondistended, nontender.     MUSCULOSKELETAL: No significant deformity or joint abnormality. No edema. Peripheral pulses intact. No varicosities.    NEUROLOGICAL: Strength and sensation symmetric and intact throughout.     PSYCHIATRIC: The mental examination revealed the patient was oriented to person, place, and time.       Estimated body mass index is 27.11 kg/m² as calculated from the following:    Height as of this encounter: 162.6 cm (64.02\").    Weight as of this encounter: 71.7 kg (158 lb).        Result Review :   The following data was reviewed by: Elisabeth Woodard " "MANOJ SkyROLANDA on 04/22/2025:  Common labs          9/9/2024    10:52   Common Labs   Creatinine 0.90              PFT:1/31/22     PULMONARY FUNCTION TEST  Order: [id number]  Narrative     This result has an attachment that is not available.  There is no airflow obstruction.    There is no significant improvement in FVC and FEV1 with one time inhaled  beta-agonist bronchodilator.    There is moderately-severe restriction.    The diffusion capacity for carbon monoxide, uncorrected for hemoglobin, is  mildly reduced; however may be underestimated due to patients effort.    The maximum inspiratory force is mild to moderately decreased, and the  maximal expiratory pressures is low normal.     Low dose lung cancer screening:NA     Previous chest imaging:  CT chest 6/23/23  CT ANGIOGRAM CHEST PULMONARY EMBOLISM  Order: [id number]  Impression     1. No pulmonary embolism. Residual left basilar pneumothorax with left chest tube    2. Findings concerning for splenic injury/laceration along the posterior aspect with small hyperenhancing foci concerning for small arterial bleeding sites with moderate sized perisplenic hematoma tracking to the left paracolic gutter and pelvis. Locules of free intraperitoneal air along the posterior aspect of the spleen and left kidney in close vicinity to the left hemidiaphragm and insertion of the chest tube could be postprocedural.    CRITICAL RESULT:    Yes    COMMUNICATION:  These findings were discussed with Dr. Beasley on 6/23/2023 5:43 PM by Martha Parmar MD via secure Epic chat.    Drafted by Martha Parmar MD on 6/23/2023 5:32 PM  Final report signed by Martha Parmar MD on 6/23/2023 5:45 PM        Alpha-1 antitrypsin screening:NA     STOP-Bang Score:   NA  Arnold Sleepiness Scale:   NA      ABG:    pH No results found for: \"PHART\"   pO2 No results found for: \"PO2ART\"   pCO2 No results found for: \"GDC3AJF\"   HCO3 No results found for: \"HFH4UIZ\"                      Assessment and Plan  "   Problem List Items Addressed This Visit    None  Visit Diagnoses         Radiation-induced pulmonary fibrosis    -  Primary    Relevant Medications    Albuterol-Budesonide (Airsupra) 90-80 MCG/ACT aerosol    Other Relevant Orders    XR Chest 2 View (Completed)    Complete PFT - Pre & Post Bronchodilator      Pulmonary HTN          Shortness of breath                Renate Mitchell  reports that she has never smoked. She has never been exposed to tobacco smoke. She has never used smokeless tobacco.     Will send in airsupra for her to use as needed.    Ordered chest xray.  Ordered PFT to assess lung function.    Pulmonary hypertension is due to radiation induced fibrosis.        Follow Up   Return in about 2 months (around 6/22/2025).  Patient was given instructions and counseling regarding her condition or for health maintenance advice. Please see specific information pulled into the AVS if appropriate.

## 2025-06-12 ENCOUNTER — OFFICE VISIT (OUTPATIENT)
Dept: CARDIOLOGY | Facility: CLINIC | Age: 45
End: 2025-06-12
Payer: COMMERCIAL

## 2025-06-12 VITALS
WEIGHT: 165.8 LBS | HEART RATE: 86 BPM | DIASTOLIC BLOOD PRESSURE: 80 MMHG | OXYGEN SATURATION: 98 % | HEIGHT: 64 IN | SYSTOLIC BLOOD PRESSURE: 110 MMHG | BODY MASS INDEX: 28.31 KG/M2

## 2025-06-12 DIAGNOSIS — R06.09 DYSPNEA ON EXERTION: ICD-10-CM

## 2025-06-12 DIAGNOSIS — E66.3 OVERWEIGHT: ICD-10-CM

## 2025-06-12 DIAGNOSIS — E11.9 TYPE 2 DIABETES MELLITUS WITHOUT COMPLICATION, WITHOUT LONG-TERM CURRENT USE OF INSULIN: ICD-10-CM

## 2025-06-12 DIAGNOSIS — I10 PRIMARY HYPERTENSION: ICD-10-CM

## 2025-06-12 DIAGNOSIS — E78.2 MIXED HYPERLIPIDEMIA: ICD-10-CM

## 2025-06-12 DIAGNOSIS — I95.1 AUTONOMIC ORTHOSTATIC HYPOTENSION: Primary | ICD-10-CM

## 2025-06-12 DIAGNOSIS — R00.0 TACHYCARDIA: ICD-10-CM

## 2025-06-12 RX ORDER — POTASSIUM CHLORIDE 750 MG/1
10 TABLET, EXTENDED RELEASE ORAL DAILY
Qty: 30 TABLET | Refills: 11 | Status: CANCELLED | OUTPATIENT
Start: 2025-06-12

## 2025-06-12 RX ORDER — AMOXICILLIN 875 MG/1
TABLET, COATED ORAL EVERY 12 HOURS SCHEDULED
COMMUNITY
Start: 2025-06-11 | End: 2025-06-21

## 2025-06-12 RX ORDER — FUROSEMIDE 20 MG/1
20 TABLET ORAL DAILY
Qty: 30 TABLET | Refills: 11 | Status: CANCELLED | OUTPATIENT
Start: 2025-06-12

## 2025-06-12 RX ORDER — CARVEDILOL 25 MG/1
25 TABLET ORAL 2 TIMES DAILY
Qty: 180 TABLET | Refills: 3 | Status: CANCELLED | OUTPATIENT
Start: 2025-06-12

## 2025-06-12 RX ORDER — ACYCLOVIR 400 MG/1
TABLET ORAL
COMMUNITY
Start: 2025-06-07

## 2025-06-12 RX ORDER — SEMAGLUTIDE 1.34 MG/ML
INJECTION, SOLUTION SUBCUTANEOUS
COMMUNITY
Start: 2025-06-05 | End: 2025-07-03

## 2025-06-12 RX ORDER — METOLAZONE 2.5 MG/1
TABLET ORAL
Qty: 90 TABLET | Refills: 3 | Status: CANCELLED | OUTPATIENT
Start: 2025-06-12

## 2025-06-12 RX ORDER — FLUDROCORTISONE ACETATE 0.1 MG/1
TABLET ORAL
Qty: 90 TABLET | Refills: 2 | Status: SHIPPED | OUTPATIENT
Start: 2025-06-12

## 2025-06-12 NOTE — PROGRESS NOTES
Chief Complaint   Patient presents with    Follow-up     Cardiac management - 7 month visit     diastolic dysfuncation     Echo 2024    She states checking BP at home and has been on the lower side right arm 58/37   - left arm 67/43 heart rate 98 - this was one night this week     She states no chest pain / tightness or palpitations     Hyperlipidemia     Rosuvastatin 40 mg --LIPID have been updated with PCP 2025 she has been on 40 mg Crestor for 3 months     labs     Last set 2023- LIPID have been updated with PCP 2025 she has been on 40 mg Crestor for 3 months     medication     Went over verbally     No daily aspirin     Refill pended     Patient did not bring med list or medicine bottles to appointment, med list has been reviewed and updated based on patient's knowledge of their meds.         Cardiac Complaints  none      Subjective   Renate Padmini Mitchell is a 45 y.o. female with palpitations, HTN,  diastolic dysfunction, hypothyroidism, and non-Hodgkin's lymphoma, who had chemo and radiation in the past. May 2015 she was seen for pericarditis versus worsening lymphoma.  Work-up showed essentially normal coronaries and mild LV dysfunction.  In 2017 repeat stress was negative for ischemia but showed increased heart rate and blood pressure response.  Echo showed EF had improved to 55%.  Cardiac monitor revealed baseline sinus rhythm with average heart rate of 96 bpm but no arrhythmia.  Beta-blocker dose was increased.  In September 2018 sleep study reported as normal.  Repeat echo and Holter monitor at that time showed similar findings.  Coreg dose increased for better heart rate control. 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 2022 more SOA noted and echo advised that showed EF of 56-60%, grade I diastolic dysfunction and RVSP of 33mmHg similar to prior, current regimen continued.   Echo 2024 EF 51-55%, trace MR, trace AR, borderline LVH.    She comes today for follow up and denies any new concerns. She does get weak at times but attributes to low blood pressure. She does report she does not drink enough. Admits her lipids have been elevated, her statin was changed. She is no longer on mounjaro due to coverage, now on ozempic. Labs with PCP,checked about 4 months ago, no current available. Refills per request.            Cardiac History  Past Surgical History:   Procedure Laterality Date    BREAST BIOPSY Right 2019    CARDIAC BIOPSY      CARDIAC CATHETERIZATION  05/07/2009    normal but small coronaries, EF 45-50%    CARDIAC CATHETERIZATION  08/04/2015    essentially normal coronaries. EF 50%    CARDIAC CATHETERIZATION  10/27/2021    [facility], Dr EDU Haas- normal PA pressure, mild increase PCWP, negative for pulmonary hypertension    CARDIOVASCULAR STRESS TEST  01/11/2008    A. Cardiolyte- (Dr. Jay). EF 45% at rest, 53% with stress    CARDIOVASCULAR STRESS TEST  02/10/2009    3 min, 20 sec. 95% THR. bp- 180/98, EF 49%. Negative    CARDIOVASCULAR STRESS TEST  07/2015    R. Stress- 4 min, 103% THR. 106/68, negative by EKG criteria    CARDIOVASCULAR STRESS TEST  05/18/2017    4 min 39 sec, 97% THR, 157/76, no obvious ischemia, increase Coreg    CHOLECYSTECTOMY N/A 03/08/2018    Procedure: CHOLECYSTECTOMY LAPAROSCOPIC;  Surgeon: Artie Oneill MD;  Location: [facility] OR;  Service:     COLONOSCOPY N/A 12/22/2017    Procedure: COLONOSCOPY;  Surgeon: Artie Oneill MD;  Location: [facility] OR;  Service:     COLONOSCOPY N/A 08/28/2024    Procedure: COLONOSCOPY FOR SCREENING;  Surgeon: Cherelle Rothman MD;  Location: [facility] OR;  Service: Gastroenterology;  Laterality: N/A;    CONVERTED (HISTORICAL) HOLTER  05/23/2017    sinus ave 96 bpm, 1 PVC, 4 PACs    CONVERTED (HISTORICAL) HOLTER  08/28/2018    Avg 96, one 6 beat SVT    DIAPHRAGM PLICATION Left 06/23/2023    ECHO - CONVERTED  02/10/2009     EF 50-55%    ECHO - CONVERTED  07/20/2015    EF 40-45%, RVSP 43 mmHg mild MR    ECHO - CONVERTED  05/18/2017    EF 50-55%, RVSP 37 mmHg, mild MR    ECHO - CONVERTED  09/05/2018    TLS. EF 55%. RVSP- 33 mmHg.    ECHO - CONVERTED  08/25/2020    TLS. EF 60%. Trace-Mild MR. RVSP- 40 mmHg.    ECHO - CONVERTED  03/29/2021    TLS. EF 55%. Trace-Mild MR & AI. RVSP- 35 mmHg    ECHO - CONVERTED  10/10/2022    TLS. EF 60%. LA- 3.8. Trace-Mild MR & AI. RVSP- 33 mmHg    ECHO - CONVERTED  12/09/2024    TLS. EF 55%, LA-3.7. THE. Trace-Nild MR & AI. RVSP-  37 mmHg    ENDOSCOPY N/A 08/28/2024    Procedure: ESOPHAGOGASTRODUODENOSCOPY WITH BIOPSY;  Surgeon: Cherelle Rothman MD;  Location: [facility];  Service: Gastroenterology;  Laterality: N/A;    ENDOSCOPY AND COLONOSCOPY  08/28/2024    HIP SURGERY Left 2020    lypoma excision    OTHER SURGICAL HISTORY  08/2015    CT abd/pelvis and chest- essentially unremarkable    OTHER SURGICAL HISTORY  08/2015    HIDA scan- WNL    PERICARDIAL WINDOW      TOTAL LAPAROSCOPIC HYSTERECTOMY N/A 03/08/2018    Procedure: TOTAL LAPAROSCOPIC HYSTERECTOMY BILATERAL SALPINGECTOMY AND LEFT OOPHORECTOMY WITH DAVINCI SI ROBOT;  Surgeon: Bill Greenwood DO;  Location: [facility];  Service:        Current Outpatient Medications   Medication Sig Dispense Refill    Albuterol-Budesonide (Airsupra) 90-80 MCG/ACT aerosol Inhale 2 puffs Every 4 (Four) Hours As Needed (shortness of breath). 10.7 g 5    amoxicillin (AMOXIL) 875 MG tablet Every 12 (Twelve) Hours.      carvedilol (COREG) 25 MG tablet Take 1 tablet by mouth 2 (Two) Times a Day. 180 tablet 3    Continuous Glucose Sensor (Dexcom G7 Sensor) misc CHANGE EVERY 10 DAYS      FLUoxetine (PROzac) 40 MG capsule Take 1 capsule by mouth Daily.      furosemide (LASIX) 20 MG tablet Take 1 tablet by mouth Daily. 30 tablet 11    HYDROcodone-acetaminophen (NORCO) 7.5-325 MG per tablet Take 1 tablet by mouth 3 (Three) Times a Day.      ibuprofen  (ADVIL,MOTRIN) 800 MG tablet As Needed.      levothyroxine (SYNTHROID, LEVOTHROID) 75 MCG tablet Take 1 tablet by mouth Daily.      ondansetron ODT (ZOFRAN-ODT) 4 MG disintegrating tablet TAKE 1 TABLET BY MOUTH EVERY 4 TO 6 HOURS AS NEEDED FOR NAUSEA AND VOMITING      Ozempic, 1 MG/DOSE, 4 MG/3ML solution pen-injector 1 mg Subcutaneous once weekly for 28 days      pantoprazole (PROTONIX) 40 MG EC tablet Take 1 tablet by mouth Daily. 90 tablet 3    potassium chloride (KLOR-CON M10) 10 MEQ CR tablet Take 1 tablet by mouth Daily. with food. 30 tablet 8    pregabalin (LYRICA) 150 MG capsule Take 1 capsule by mouth Every Night.      rosuvastatin (CRESTOR) 20 MG tablet Take 2 tablets by mouth Daily.      SUMAtriptan (IMITREX) 50 MG tablet 1 tablet 1 (One) Time As Needed for Migraine. Take as directed      tiZANidine (ZANAFLEX) 4 MG tablet Take 2 tablets by mouth Every Night.      VITAMIN D, CHOLECALCIFEROL, PO Take 5,000 Units by mouth Daily.      fludrocortisone 0.1 MG tablet May use daily if SBP < or equal to 100 90 tablet 2     No current facility-administered medications for this visit.       Codeine, Cymbalta [duloxetine hcl], Neurontin [gabapentin], Prednisolone, Sulfa antibiotics, Tramadol hcl, and Tyloxapol    Past Medical History:   Diagnosis Date    Abnormal uterine bleeding (AUB)     Arthritis     Arthritis, lumbar spine     Chest biopsy     Chronic idiopathic fibrosing alveolitis 2021    Coronary artery disease 2021    Depression     Diabetes mellitus     Diabetic peripheral neuropathy associated with type 2 diabetes mellitus 09/22/2022    Difficulty walking 2008    Due to neuropathy, sometimes I can’t put no weight on my fee    Disease of thyroid gland     GERD (gastroesophageal reflux disease)     H/O exploratory laparotomy     H/O tubal ligation     Heart BX , benign     Heart disease     History of bone marrow biopsy     History of chemotherapy     History of radiation therapy     HTN (hypertension)      ((Pt Qnr Sub: error))     Migraine     Mixed hyperlipidemia 08/30/2021    Neuropathy     CAUSED BY RADIATIONA ND CHEMO PER PATIENT    Non Hodgkin's lymphoma     very close to heart, in remission, last radiation 2/08, last chemo 12/07    Pain     LOWER LEFT QUAD    Pericardial effusion     s/p natty window- removed 398 cc     Peripheral neuropathy 2008    Pulmonary HTN     Tachycardia        Social History     Socioeconomic History    Marital status: Legally    Tobacco Use    Smoking status: Never     Passive exposure: Never    Smokeless tobacco: Never   Vaping Use    Vaping status: Never Used   Substance and Sexual Activity    Alcohol use: No    Drug use: No    Sexual activity: Yes     Partners: Male     Birth control/protection: Tubal ligation, Hysterectomy       Family History   Problem Relation Age of Onset    Heart disease Mother     Hypertension Mother     Heart failure Mother     Hypertension Father     Hyperlipidemia Father     Diabetes Father     Heart disease Child     Breast cancer Maternal Aunt     Diabetes Maternal Aunt     Colon cancer Maternal Uncle     Leukemia Paternal Uncle     Hodgkin's lymphoma Paternal Uncle     Lung cancer Paternal Grandfather     Cancer Paternal Grandfather         Lung Cancer    Leukemia Maternal Aunt        Review of Systems   Constitutional: Negative for malaise/fatigue and night sweats.   Cardiovascular:  Negative for chest pain, claudication, dyspnea on exertion, irregular heartbeat, leg swelling, near-syncope, orthopnea, palpitations and syncope.   Respiratory:  Negative for cough, shortness of breath and wheezing.    Musculoskeletal:  Positive for stiffness. Negative for back pain and joint pain.   Gastrointestinal:  Negative for anorexia, heartburn, nausea and vomiting.   Genitourinary:  Negative for dysuria, hematuria, hesitancy and nocturia.   Neurological:  Positive for light-headedness. Negative for dizziness and headaches.   Psychiatric/Behavioral:   "Negative for depression and memory loss. The patient is not nervous/anxious.            Objective     /80 (BP Location: Left arm, Patient Position: Sitting, Cuff Size: Adult)   Pulse 86   Ht 162.6 cm (64.02\")   Wt 75.2 kg (165 lb 12.8 oz)   LMP 03/05/2018   SpO2 98%   BMI 28.44 kg/m²     Constitutional:       Appearance: Not in distress.   Eyes:      Pupils: Pupils are equal, round, and reactive to light.   HENT:      Nose: Nose normal.   Pulmonary:      Effort: Pulmonary effort is normal.      Breath sounds: Normal breath sounds.   Cardiovascular:      PMI at left midclavicular line. Normal rate. Regular rhythm.      Murmurs: There is a systolic murmur.   Abdominal:      Palpations: Abdomen is soft.   Musculoskeletal: Normal range of motion.      Cervical back: Normal range of motion and neck supple. Skin:     General: Skin is warm and dry.   Neurological:      Mental Status: Alert.           ECG 12 Lead    Date/Time: 6/12/2025 9:02 AM  Performed by: Alba Rahman APRN    Authorized by: Alba Rahman APRN  Comparison: compared with previous ECG from 12/3/2024  Similar to previous ECG  Rhythm: sinus rhythm  BPM: 77    Clinical impression: non-specific ECG  Comments: Normal QT               Diagnoses and all orders for this visit:    1. Autonomic orthostatic hypotension (Primary)    2. Tachycardia  -     ECG 12 Lead    3. Dyspnea on exertion    4. Primary hypertension    5. Mixed hyperlipidemia    6. Type 2 diabetes mellitus without complication, without long-term current use of insulin    7. Overweight    Other orders  -     fludrocortisone 0.1 MG tablet; May use daily if SBP < or equal to 100  Dispense: 90 tablet; Refill: 2             SOA/Diastolic dysfunction: lasix will be continued, she is doing well. She tolerates well. She admits this does dehydrate her at times. She was encouraged to increase fluids/electrolytes.      Palpitations/HTN: Well managed on current therapy, palpitations denied. " Continue same coreg. EKG done today showed NSR with normal QT. BP is lower normal, she admits to some low readings at times. Will urge to add florinef 0.1mg as needed for better control.    Cardiac status: Stable. Recent echo showed valve areas stable, normal LV function.    Hyperlipidemia: Now taking crestor per your office. Tolerates well. FLP with you. Continue same. Can we have labs.    DM: Taking ozempic. AIC with you.      Edema: Well managed on current, using lasix, but with weight loss and lower BP, will continue at 20mg. Continue K replacement.      Labs with your office, can we have for review?      Refills per request.     BMI noted at 27.98, good cardiac ADA diet with limited carbs, calories, and walking regimen advised. Patient praised for weight loss efforts. Continues mounjaro.     6 month follow up advised or sooner if needed.              Problems Addressed this Visit          Cardiac and Vasculature    Mixed hyperlipidemia       Endocrine and Metabolic    Type 2 diabetes mellitus without complication, without long-term current use of insulin    Relevant Medications    Ozempic, 1 MG/DOSE, 4 MG/3ML solution pen-injector     Other Visit Diagnoses         Autonomic orthostatic hypotension    -  Primary      Tachycardia        Relevant Orders    ECG 12 Lead      Dyspnea on exertion          Primary hypertension          Overweight              Diagnoses         Codes Comments      Autonomic orthostatic hypotension    -  Primary ICD-10-CM: I95.1  ICD-9-CM: 458.0       Tachycardia     ICD-10-CM: R00.0  ICD-9-CM: 785.0       Dyspnea on exertion     ICD-10-CM: R06.09  ICD-9-CM: 786.09       Primary hypertension     ICD-10-CM: I10  ICD-9-CM: 401.9       Mixed hyperlipidemia     ICD-10-CM: E78.2  ICD-9-CM: 272.2       Type 2 diabetes mellitus without complication, without long-term current use of insulin     ICD-10-CM: E11.9  ICD-9-CM: 250.00       Overweight     ICD-10-CM: E66.3  ICD-9-CM: 278.02                        Electronically signed by Alba Rahman, APRN June 12, 2025 09:14 EDT

## 2025-06-17 ENCOUNTER — HOSPITAL ENCOUNTER (OUTPATIENT)
Dept: RESPIRATORY THERAPY | Facility: HOSPITAL | Age: 45
Discharge: HOME | End: 2025-06-17
Admitting: NURSE PRACTITIONER
Payer: COMMERCIAL

## 2025-06-17 VITALS — RESPIRATION RATE: 18 BRPM | HEART RATE: 77 BPM | OXYGEN SATURATION: 97 %

## 2025-06-17 DIAGNOSIS — J70.1 RADIATION-INDUCED PULMONARY FIBROSIS: ICD-10-CM

## 2025-06-17 PROCEDURE — 94726 PLETHYSMOGRAPHY LUNG VOLUMES: CPT | Performed by: INTERNAL MEDICINE

## 2025-06-17 PROCEDURE — 94060 EVALUATION OF WHEEZING: CPT | Performed by: INTERNAL MEDICINE

## 2025-06-17 PROCEDURE — 94060 EVALUATION OF WHEEZING: CPT

## 2025-06-17 PROCEDURE — 94726 PLETHYSMOGRAPHY LUNG VOLUMES: CPT

## 2025-06-17 PROCEDURE — 94799 UNLISTED PULMONARY SVC/PX: CPT

## 2025-06-17 PROCEDURE — 94640 AIRWAY INHALATION TREATMENT: CPT

## 2025-06-17 PROCEDURE — 94729 DIFFUSING CAPACITY: CPT

## 2025-06-17 PROCEDURE — 94729 DIFFUSING CAPACITY: CPT | Performed by: INTERNAL MEDICINE

## 2025-06-17 RX ORDER — ALBUTEROL SULFATE 0.83 MG/ML
2.5 SOLUTION RESPIRATORY (INHALATION) ONCE
Status: COMPLETED | OUTPATIENT
Start: 2025-06-17 | End: 2025-06-17

## 2025-06-17 RX ADMIN — ALBUTEROL SULFATE 2.5 MG: 2.5 SOLUTION RESPIRATORY (INHALATION) at 09:26

## 2025-06-24 ENCOUNTER — OFFICE VISIT (OUTPATIENT)
Dept: PULMONOLOGY | Facility: CLINIC | Age: 45
End: 2025-06-24
Payer: COMMERCIAL

## 2025-06-24 VITALS
BODY MASS INDEX: 29.12 KG/M2 | DIASTOLIC BLOOD PRESSURE: 76 MMHG | OXYGEN SATURATION: 99 % | TEMPERATURE: 96.9 F | HEART RATE: 78 BPM | SYSTOLIC BLOOD PRESSURE: 122 MMHG | WEIGHT: 170.6 LBS | HEIGHT: 64 IN

## 2025-06-24 DIAGNOSIS — J98.4 RESTRICTIVE LUNG DISEASE: ICD-10-CM

## 2025-06-24 DIAGNOSIS — I27.20 PULMONARY HTN: ICD-10-CM

## 2025-06-24 DIAGNOSIS — J70.1 RADIATION-INDUCED PULMONARY FIBROSIS: Primary | ICD-10-CM

## 2025-06-24 PROCEDURE — 3074F SYST BP LT 130 MM HG: CPT | Performed by: NURSE PRACTITIONER

## 2025-06-24 PROCEDURE — 3078F DIAST BP <80 MM HG: CPT | Performed by: NURSE PRACTITIONER

## 2025-06-24 PROCEDURE — 1160F RVW MEDS BY RX/DR IN RCRD: CPT | Performed by: NURSE PRACTITIONER

## 2025-06-24 PROCEDURE — 1159F MED LIST DOCD IN RCRD: CPT | Performed by: NURSE PRACTITIONER

## 2025-06-24 PROCEDURE — 99214 OFFICE O/P EST MOD 30 MIN: CPT | Performed by: NURSE PRACTITIONER

## 2025-06-24 NOTE — PROGRESS NOTES
"Chief Complaint  Radiation-induced pulmonary fibrosis    Subjective          Renate Padmini Mitchell presents to [facility] for   History of Present Illness      Ms. Mitchell is a 45-year-old female with a medical history significant for hyperlipidemia, arthritis, CAD, depression, diabetes, GERD, fibrosis, hypertension and migraine.    She presents today for follow-up on radiation-induced pulmonary fibrosis.  Reports that overall she has been doing well.  She states that her breathing is about the same.  She has been using Airsupra as needed and reports that this does help if she is having worsening shortness of breath.    Objective   Vital Signs:   /76   Pulse 78   Temp 96.9 °F (36.1 °C)   Ht 162.6 cm (64.02\")   Wt 77.4 kg (170 lb 9.6 oz)   SpO2 99%   BMI 29.27 kg/m²         Physical Exam    GENERAL APPEARANCE: Well developed, well nourished, alert and cooperative, and appears to be in no acute distress.    HEAD: normocephalic. Atraumatic.    EYES: PERRL, EOMI. Vision is grossly intact.    THROAT: Oral cavity and pharynx normal. No inflammation, swelling, exudate, or lesions.     NECK: Neck supple.  No thyromegaly.    CARDIAC: Normal S1 and S2. No S3, S4 or murmurs. Rhythm is regular.     RESPIRATORY:Bilateral air entry positive. No wheezing, crackles or rhonchi noted.    GI: Positive bowel sounds. Soft, nondistended, nontender.     MUSCULOSKELETAL: No significant deformity or joint abnormality. No edema. Peripheral pulses intact. No varicosities.    NEUROLOGICAL: Strength and sensation symmetric and intact throughout.     PSYCHIATRIC: The mental examination revealed the patient was oriented to person, place, and time.       Estimated body mass index is 29.27 kg/m² as calculated from the following:    Height as of this encounter: 162.6 cm (64.02\").    Weight as of this encounter: 77.4 kg (170 lb 9.6 oz).        Result Review :   The following data was " "reviewed by: KELLEY Pyle on 06/24/2025:  Common labs          9/9/2024    10:52   Common Labs   Creatinine 0.90           PFT:6/17/25    Spirometry suggest restriction with no significant bronchodilator effect seen on this occasion. Diffusion capacity is mildly reduced. Lung volumes shows mild restrictive lung disease. Flow-volume loop is restricted.     Low dose lung cancer screening:NA    Previous chest imaging:    Chest xray 4/22/25    FINDINGS:  LUNGS: Lungs are adequately aerated.      HEART AND MEDIASTINUM: Heart and mediastinal contours are unremarkable        SKELETON: Bony and soft tissue structures are unremarkable.           IMPRESSION:  No radiographic evidence of acute cardiac or pulmonary disease.        This report was finalized on 4/22/2025 9:59 AM by Dr. Quentin Hendricks MD.    Alpha-1 antitrypsin screening:NA    STOP-Bang Score:   NA  [place] Sleepiness Scale:   NA      ABG:    pH No results found for: \"PHART\"   pO2 No results found for: \"PO2ART\"   pCO2 No results found for: \"DHJ0MVW\"   HCO3 No results found for: \"[id number]\"                      Assessment and Plan    Problem List Items Addressed This Visit    None  Visit Diagnoses         Radiation-induced pulmonary fibrosis    -  Primary      Pulmonary HTN          Restrictive lung disease        Relevant Orders    CT chest hi resolution            Renate Mitchell  reports that she has never smoked. She has never been exposed to tobacco smoke. She has never used smokeless tobacco.       Continue airsupra as needed.    PFT shows striction which is consistent with radiation induced fibrosis.    Will order high-resolution CT chest to assess for any type of progression or new fibrosis.      Follow Up   Return in about 6 months (around 12/24/2025).  Patient was given instructions and counseling regarding her condition or for health maintenance advice. Please see specific information pulled into the AVS if appropriate.        "

## 2025-07-16 ENCOUNTER — HOSPITAL ENCOUNTER (OUTPATIENT)
Dept: CT IMAGING | Facility: HOSPITAL | Age: 45
Discharge: HOME | End: 2025-07-16
Admitting: NURSE PRACTITIONER
Payer: COMMERCIAL

## 2025-07-16 DIAGNOSIS — J98.4 RESTRICTIVE LUNG DISEASE: ICD-10-CM

## 2025-07-16 PROCEDURE — 71250 CT THORAX DX C-: CPT | Performed by: RADIOLOGY

## 2025-07-16 PROCEDURE — 71250 CT THORAX DX C-: CPT

## 2025-07-23 ENCOUNTER — DOCUMENTATION (OUTPATIENT)
Dept: PULMONOLOGY | Facility: CLINIC | Age: 45
End: 2025-07-23
Payer: COMMERCIAL

## 2025-07-23 ENCOUNTER — TELEPHONE (OUTPATIENT)
Dept: PULMONOLOGY | Facility: CLINIC | Age: 45
End: 2025-07-23
Payer: COMMERCIAL

## 2025-08-04 DIAGNOSIS — R06.09 DYSPNEA ON EXERTION: ICD-10-CM

## 2025-08-04 DIAGNOSIS — I10 PRIMARY HYPERTENSION: ICD-10-CM

## 2025-08-07 ENCOUNTER — TELEPHONE (OUTPATIENT)
Dept: GASTROENTEROLOGY | Facility: CLINIC | Age: 45
End: 2025-08-07
Payer: COMMERCIAL

## 2025-08-08 RX ORDER — METOLAZONE 2.5 MG/1
TABLET ORAL
Qty: 30 TABLET | Refills: 11 | OUTPATIENT
Start: 2025-08-08

## 2025-08-18 ENCOUNTER — HOSPITAL ENCOUNTER (OUTPATIENT)
Dept: MRI IMAGING | Facility: HOSPITAL | Age: 45
Discharge: HOME | End: 2025-08-18
Admitting: RADIOLOGY
Payer: COMMERCIAL

## 2025-08-18 DIAGNOSIS — Z85.72 PERSONAL HISTORY OF MALIGNANT LYMPHOMA: ICD-10-CM

## 2025-08-18 PROCEDURE — 25510000001 GADOPICLENOL 0.5 MMOL/ML SOLUTION: Performed by: RADIOLOGY

## 2025-08-18 PROCEDURE — 77049 MRI BREAST C-+ W/CAD BI: CPT

## 2025-08-18 PROCEDURE — A9573 GADOPICLENOL 0.5 MMOL/ML SOLUTION: HCPCS | Performed by: RADIOLOGY

## 2025-08-18 PROCEDURE — 77049 MRI BREAST C-+ W/CAD BI: CPT | Performed by: RADIOLOGY

## 2025-08-18 RX ADMIN — GADOPICLENOL 8 ML: 485.1 INJECTION INTRAVENOUS at 12:04

## 2025-08-27 ENCOUNTER — TELEPHONE (OUTPATIENT)
Dept: CARDIOLOGY | Facility: CLINIC | Age: 45
End: 2025-08-27
Payer: COMMERCIAL

## 2025-08-27 RX ORDER — FLUDROCORTISONE ACETATE 0.1 MG/1
0.1 TABLET ORAL 2 TIMES DAILY
Qty: 60 TABLET | Refills: 6 | Status: SHIPPED | OUTPATIENT
Start: 2025-08-27

## 2025-08-27 RX ORDER — TORSEMIDE 10 MG/1
10 TABLET ORAL DAILY
Qty: 30 TABLET | Refills: 6 | Status: SHIPPED | OUTPATIENT
Start: 2025-08-27

## (undated) DEVICE — ENDOPATH ELECTROSURGERY PROBE PLUS II PISTOL HAND CONTROL PISTOL GRIP HANDLES WITH SUCTION AND IRRRIGATION FOR HAND CONTROL MONOPOLAR ELCTROSURGERY USE ONLY WITH PROBE PLUS II SHAFTS: Brand: ENDOPATH

## (undated) DEVICE — ENDOPATH XCEL BLADELESS TROCARS WITH STABILITY SLEEVES: Brand: ENDOPATH XCEL

## (undated) DEVICE — SEAL CANN CAM ENDOWRIST DAVINCI/S 8.5MM

## (undated) DEVICE — FRCP BX RADJAW4 NDL 2.8 240CM LG OG BX40

## (undated) DEVICE — CYSTO/BLADDER IRRIGATION SET, REGULATING CLAMP

## (undated) DEVICE — 2, DISPOSABLE SUCTION/IRRIGATOR WITH DISPOSABLE TIP: Brand: STRYKEFLOW

## (undated) DEVICE — Device: Brand: DEFENDO AIR/WATER/SUCTION AND BIOPSY VALVE

## (undated) DEVICE — 3M™ STERI-STRIP™ REINFORCED ADHESIVE SKIN CLOSURES, R1547, 1/2 IN X 4 IN (12 MM X 100 MM), 6 STRIPS/ENVELOPE: Brand: 3M™ STERI-STRIP™

## (undated) DEVICE — Device

## (undated) DEVICE — THE BITE BLOCK MAXI, LATEX FREE STRAP IS USED TO PROTECT THE ENDOSCOPE INSERTION TUBE FROM BEING BITTEN BY THE PATIENT.

## (undated) DEVICE — TROCAR: Brand: KII FIOS FIRST ENTRY

## (undated) DEVICE — ADHS LIQ MASTISOL 2/3ML

## (undated) DEVICE — HOLDER: Brand: DEROYAL

## (undated) DEVICE — ENDOPATH ELECTROSURGERY PROBE PLUS II 5MM RIGHT ANGLE MONOPOLAR ELECTROSURGERY SUCTION AND IRRRIGATION SHAFTS WITH RIGHT ANGLE ELECTRODE - USE ONLY WITH PROBE PLUS II HANDLES: Brand: ENDOPATH

## (undated) DEVICE — SUCTION CANISTER, 1500CC, RIGID: Brand: DEROYAL

## (undated) DEVICE — APPL CHLORAPREP W/TINT 26ML ORNG

## (undated) DEVICE — ENDOGATOR AUXILIARY WATER JET CONNECTOR: Brand: ENDOGATOR

## (undated) DEVICE — SYS CLOSE PORTII CARTR/THOMASN XL

## (undated) DEVICE — 40595 XL TRENDELENBURG POSITIONING KIT: Brand: 40595 XL TRENDELENBURG POSITIONING KIT

## (undated) DEVICE — ENCORE® LATEX MICRO SIZE 7.5, STERILE LATEX POWDER-FREE SURGICAL GLOVE: Brand: ENCORE

## (undated) DEVICE — BNDG ADHS CURAD FLX/FABRC 2X4IN STRL LF

## (undated) DEVICE — OBT BLADLES ENDOWRIST DAVINCI/S 8MM

## (undated) DEVICE — PAD GRND REM POLYHESIVE A/ DISP

## (undated) DEVICE — ENCORE® LATEX MICRO SIZE 8, STERILE LATEX POWDER-FREE SURGICAL GLOVE: Brand: ENCORE

## (undated) DEVICE — TROCAR: Brand: KII® SLEEVE

## (undated) DEVICE — SYR LUERLOK 30CC

## (undated) DEVICE — SUT VIC 2/0 UR6 27IN J602H

## (undated) DEVICE — LAPAROSCOPIC SCOPE WARMER: Brand: DEROYAL

## (undated) DEVICE — SNAR POLYP CAPTIFLX MICRO OVL 13MM 240CM

## (undated) DEVICE — CONN Y IRR DISP 1P/U

## (undated) DEVICE — ANTIBACTERIAL UNDYED BRAIDED (POLYGLACTIN 910), SYNTHETIC ABSORBABLE SUTURE: Brand: COATED VICRYL

## (undated) DEVICE — COVER,MAYO STAND,STERILE: Brand: MEDLINE

## (undated) DEVICE — ENDOCUT SCISSOR TIP, DISPOSABLE: Brand: RENEW

## (undated) DEVICE — COR LAP CHOLE: Brand: MEDLINE INDUSTRIES, INC.

## (undated) DEVICE — CANNULA SEAL

## (undated) DEVICE — TUBING, SUCTION, 1/4" X 20', STRAIGHT: Brand: MEDLINE INDUSTRIES, INC.

## (undated) DEVICE — SAFESECURE,SECUREMENT,FOLEY CATH,STERILE: Brand: MEDLINE

## (undated) DEVICE — GOWN,REINF,POLY,ECL,PP SLV,XL: Brand: MEDLINE

## (undated) DEVICE — Device: Brand: ENDOGATOR

## (undated) DEVICE — ANTIBACTERIAL VIOLET BRAIDED (POLYGLACTIN 910), SYNTHETIC ABSORBABLE SURGICAL SUTURE: Brand: COATED VICRYL

## (undated) DEVICE — PK DAVINCI 70

## (undated) DEVICE — PAD SANI MAXI W/ADHS SNG WRP 11IN

## (undated) DEVICE — TIP COVER ACCESSORY

## (undated) DEVICE — [HIGH FLOW INSUFFLATOR,  DO NOT USE IF PACKAGE IS DAMAGED,  KEEP DRY,  KEEP AWAY FROM SUNLIGHT,  PROTECT FROM HEAT AND RADIOACTIVE SOURCES.]: Brand: PNEUMOSURE

## (undated) DEVICE — POLYP TRAP: Brand: TRAPEASE®

## (undated) DEVICE — SUT MNCRYL 4/0 PS2 18 IN